# Patient Record
Sex: MALE | Race: WHITE | NOT HISPANIC OR LATINO | Employment: OTHER | URBAN - METROPOLITAN AREA
[De-identification: names, ages, dates, MRNs, and addresses within clinical notes are randomized per-mention and may not be internally consistent; named-entity substitution may affect disease eponyms.]

---

## 2020-03-19 PROBLEM — I10 ESSENTIAL HYPERTENSION: Status: ACTIVE | Noted: 2020-03-19

## 2020-03-19 PROBLEM — I48.91 A-FIB: Status: ACTIVE | Noted: 2020-03-19

## 2020-03-19 PROBLEM — R06.02 SOB (SHORTNESS OF BREATH): Status: ACTIVE | Noted: 2020-03-19

## 2020-03-19 PROBLEM — E78.5 HYPERLIPIDEMIA LDL GOAL <100: Status: ACTIVE | Noted: 2020-03-19

## 2020-03-19 PROBLEM — Q21.9: Status: ACTIVE | Noted: 2020-03-19

## 2020-03-19 NOTE — PROGRESS NOTES
OFFICE VISIT  NOTE  [facility] CARDIOLOGY      Name: Shaun Tyler    Date: 3/23/2020  MRN:  [id number]  :  1944      REFERRING/PRIMARY PROVIDER:  Alejandro Richard P*    Chief Complaint   Patient presents with   • Atrial Fibrillation   • Palpitations       HPI: Shaun Tyler is a 76 y.o. male who presents today for new consultation for a-fib.  Associate history of percutaneous ASD closure in , hypertension, hyperlipidemia.  Patient reports 3 days after ASD closure he had atrial fibrillation treated with external cardioversion successfully.  Approximately 1 year ago he developed fatigue and palpitations, diagnosed with paroxysmal atrial fibrillation.  A. fib worsening over the last 3 months, symptoms include shortness of breath, decreased exercise tolerance, and fatigue.  He cannot exercise as long as he used to.  Work-up at  2020 includes echocardiogram showing EF of 40 to 50%, mild LA enlargement, moderate RA enlargement, normal pulmonary pressures, ASD closure device in place.  Patient was started on metoprolol, Eliquis, and lisinopril recently.  Still having fatigue and palpitations.  No bleeding complications with Eliquis thus far.  Home blood pressure log reviewed, good control on current regimen.  No side effects of current medical therapy.    Past Medical History:   Diagnosis Date   • A-fib (CMS/HCC)    • Atrial septal defect    • Chicken pox    • Hiatal hernia    • Measles    • Mumps    • S/P patch closure of atrial septal defect        Past Surgical History:   Procedure Laterality Date   • ATRIAL SEPTAL DEFECT REPAIR         Social History     Socioeconomic History   • Marital status:      Spouse name: Not on file   • Number of children: Not on file   • Years of education: Not on file   • Highest education level: Not on file   Tobacco Use   • Smoking status: Never Smoker   • Smokeless tobacco: Never Used   Substance and Sexual Activity   • Alcohol  use: Not Currently   • Drug use: Never   • Sexual activity: Defer       Family History   Problem Relation Age of Onset   • Cancer Mother    • Pneumonia Sister         ROS:   Constitutional no fever,  no weight loss   Skin no rash, no subcutaneous nodules   Otolaryngeal no difficulty swallowing   Cardiovascular See HPI   Pulmonary no cough, no sputum production   Gastrointestinal no constipation, no diarrhea   Genitourinary no dysuria, no hematuria   Hematologic no easy bruisability, no abnormal bleeding   Musculoskeletal no muscle pain   Neurologic no dizziness, no falls         Allergies   Allergen Reactions   • Penicillins Itching         Current Outpatient Medications:   •  apixaban (ELIQUIS) 5 MG tablet tablet, Take 5 mg by mouth 2 (Two) Times a Day., Disp: , Rfl:   •  ferrous sulfate 325 (65 FE) MG tablet, Take 325 mg by mouth Daily With Breakfast., Disp: , Rfl:   •  fluticasone (FLONASE) 50 MCG/ACT nasal spray, 2 sprays into the nostril(s) as directed by provider Daily., Disp: , Rfl:   •  lansoprazole (PREVACID) 30 MG capsule, Take 30 mg by mouth Daily., Disp: , Rfl:   •  lisinopril (PRINIVIL,ZESTRIL) 5 MG tablet, Take 5 mg by mouth Daily., Disp: , Rfl:   •  montelukast (SINGULAIR) 10 MG tablet, Take 10 mg by mouth Every Night., Disp: , Rfl:   •  psyllium (METAMUCIL) 58.6 % packet, Take 1 packet by mouth Daily., Disp: , Rfl:   •  simvastatin (ZOCOR) 40 MG tablet, Take 40 mg by mouth Every Night., Disp: , Rfl:   •  sodium chloride 0.65 % nasal spray, 1 spray into the nostril(s) as directed by provider As Needed for Congestion., Disp: , Rfl:   •  flecainide (TAMBOCOR) 50 MG tablet, Take 1 tablet by mouth 2 (Two) Times a Day., Disp: 180 tablet, Rfl: 3  •  metoprolol succinate XL (TOPROL-XL) 25 MG 24 hr tablet, Take 1 tablet by mouth Daily., Disp: 90 tablet, Rfl: 3    Vitals:    03/23/20 0928   BP: 109/81   BP Location: Right arm   Patient Position: Sitting   Pulse: 82   Weight: 106 kg (234 lb 3.2 oz)   Height:  "195.6 cm (77\")     Body mass index is 27.77 kg/m².    PHYSICAL EXAM:    General Appearance:   · well developed  · well nourished  HENT:   · oropharynx moist  · lips not cyanotic  Neck:  · thyroid not enlarged  · supple  Respiratory:  · no respiratory distress  · normal breath sounds  · no rales  Cardiovascular:  · no jugular venous distention  · Irregular iregular rhythm  · apical impulse normal  · S1 normal, S2 normal  · no S3, no S4   · no murmur  · no rub, no thrill  · carotid pulses normal; no bruit  · pedal pulses normal  · lower extremity edema: none    Gastrointestinal:   · bowel sounds normal  · non-tender  · no hepatomegaly, no splenomegaly  Musculoskeletal:  · no clubbing of fingers.   · normocephalic, head atraumatic  Skin:   · warm, dry  Psychiatric:  · judgement and insight appropriate  · normal mood and affect    RESULTS:     ECG 12 Lead  Date/Time: 3/23/2020 10:26 AM  Performed by: Shaun Parker MD  Authorized by: Shaun Parker MD   Comparison: not compared with previous ECG   Previous ECG: no previous ECG available  Rhythm: atrial fibrillation  Rate: normal  BPM: 86  Comments: QRS duration 96 ms                   Labs:  No results found for: CHOL, TRIG, HDL, LDL, AST, ALT  No results found for: HGBA1C  No components found for: CREATINININE  No results found for: EGFRIFNONA      ASSESSMENT:  Problem List Items Addressed This Visit        Cardiovascular and Mediastinum    A-fib (CMS/HCC) - Primary    Overview     2/20/20 Echo  · LVEF 40-50%  · Right ventricular moderately dilated  · Right atrium moderately dilated  · Normal appearing atrial PFO closure device  · No significant valvular abnormalities    1/17/17 Echo  · LVEF >55%  · Normal PFO closure device  · No significant valvular abnormalities          Relevant Medications    metoprolol succinate XL (TOPROL-XL) 25 MG 24 hr tablet    Essential hypertension    Relevant Medications    lisinopril (PRINIVIL,ZESTRIL) 5 MG tablet    metoprolol " succinate XL (TOPROL-XL) 25 MG 24 hr tablet    Hyperlipidemia LDL goal <100    Relevant Medications    simvastatin (ZOCOR) 40 MG tablet    Congenital septal defect of heart    Overview     Hx of ASD closure, in [place], approximately 2003         Relevant Medications    metoprolol succinate XL (TOPROL-XL) 25 MG 24 hr tablet       Respiratory    SOB (shortness of breath)          PLAN:    1.  Persistent atrial fibrillation:  Due to ongoing nature of symptoms despite rate lowering medications, will proceed with antiarrhythmic and possible external cardioversion.  Start flecainide 50 mg twice daily, of note patient is NYHA I with an EF of 40-50%.  Continue metoprolol, decrease to 25 mg daily, he has XL formulation.  Continue Eliquis 5 mg twice daily  CHADSVASC score is 3.    We discussed good sleep hygiene, decrease caffeine, increase water, and moderate exercise.    2.  Hypertension:  Home blood pressure log reviewed, well controlled at this time on metoprolol and lisinopril, continue for now.  Long-term goal blood pressure is 130/80     3.  Hyperlipidemia:  Lipids well controlled on simvastatin 40 mg daily continue for now.    Return to clinic in 1 month, or sooner as needed.    Thank you for the opportunity to share in the care of your patient; please do not hesitate to call me with any questions.     Shaun Parker MD, [facility]  Office: [phone] [address] [place] [address]

## 2020-03-23 ENCOUNTER — TELEPHONE (OUTPATIENT)
Dept: CARDIOLOGY | Facility: CLINIC | Age: 75
End: 2020-03-23

## 2020-03-23 ENCOUNTER — CONSULT (OUTPATIENT)
Dept: CARDIOLOGY | Facility: CLINIC | Age: 75
End: 2020-03-23

## 2020-03-23 VITALS
WEIGHT: 234.2 LBS | HEART RATE: 82 BPM | SYSTOLIC BLOOD PRESSURE: 109 MMHG | HEIGHT: 77 IN | BODY MASS INDEX: 27.65 KG/M2 | DIASTOLIC BLOOD PRESSURE: 81 MMHG

## 2020-03-23 DIAGNOSIS — Q21.9 CONGENITAL SEPTAL DEFECT OF HEART: ICD-10-CM

## 2020-03-23 DIAGNOSIS — E78.5 HYPERLIPIDEMIA LDL GOAL <100: ICD-10-CM

## 2020-03-23 DIAGNOSIS — I10 ESSENTIAL HYPERTENSION: ICD-10-CM

## 2020-03-23 DIAGNOSIS — I48.19 OTHER PERSISTENT ATRIAL FIBRILLATION (HCC): Primary | ICD-10-CM

## 2020-03-23 DIAGNOSIS — R06.02 SOB (SHORTNESS OF BREATH): ICD-10-CM

## 2020-03-23 PROBLEM — Q21.10 ATRIAL SEPTAL DEFECT: Status: ACTIVE | Noted: 2020-03-23

## 2020-03-23 PROCEDURE — 93000 ELECTROCARDIOGRAM COMPLETE: CPT | Performed by: INTERNAL MEDICINE

## 2020-03-23 PROCEDURE — 99204 OFFICE O/P NEW MOD 45 MIN: CPT | Performed by: INTERNAL MEDICINE

## 2020-03-23 RX ORDER — MONTELUKAST SODIUM 10 MG/1
10 TABLET ORAL NIGHTLY
COMMUNITY
End: 2022-07-11

## 2020-03-23 RX ORDER — ECHINACEA PURPUREA EXTRACT 125 MG
1 TABLET ORAL AS NEEDED
COMMUNITY
End: 2021-03-08

## 2020-03-23 RX ORDER — LISINOPRIL 5 MG/1
5 TABLET ORAL DAILY
COMMUNITY
End: 2020-09-28 | Stop reason: SDUPTHER

## 2020-03-23 RX ORDER — METOPROLOL SUCCINATE 25 MG/1
25 TABLET, EXTENDED RELEASE ORAL DAILY
Qty: 90 TABLET | Refills: 3 | Status: SHIPPED | OUTPATIENT
Start: 2020-03-23 | End: 2020-03-26 | Stop reason: SDUPTHER

## 2020-03-23 RX ORDER — FERROUS SULFATE 325(65) MG
325 TABLET ORAL WEEKLY
COMMUNITY

## 2020-03-23 RX ORDER — METOPROLOL SUCCINATE 25 MG/1
25 TABLET, EXTENDED RELEASE ORAL 2 TIMES DAILY
COMMUNITY
End: 2020-03-23 | Stop reason: DRUGHIGH

## 2020-03-23 RX ORDER — LANSOPRAZOLE 30 MG/1
30 CAPSULE, DELAYED RELEASE ORAL DAILY
COMMUNITY

## 2020-03-23 RX ORDER — FLUTICASONE PROPIONATE 50 MCG
2 SPRAY, SUSPENSION (ML) NASAL AS NEEDED
COMMUNITY

## 2020-03-23 RX ORDER — FLECAINIDE ACETATE 50 MG/1
50 TABLET ORAL 2 TIMES DAILY
Qty: 180 TABLET | Refills: 3 | Status: SHIPPED | OUTPATIENT
Start: 2020-03-23 | End: 2020-03-26 | Stop reason: SDUPTHER

## 2020-03-23 RX ORDER — SIMVASTATIN 40 MG
40 TABLET ORAL NIGHTLY
Status: ON HOLD | COMMUNITY
End: 2022-07-19

## 2020-03-23 NOTE — TELEPHONE ENCOUNTER
Pt called to clarify when to take his medications. I advised he is to take Flecainide 50 mg twice a day, once in the morning and once in the evening and he is to take his metoprolol succinate only ONCE a day. Advised he may take it with his flecainide in the morning. Advised if he has more questions to call me. He repeated medication schedule back to me. Verbalized understanding and agreeable to plan.

## 2020-03-23 NOTE — PATIENT INSTRUCTIONS
Start flecainide 50 mg twice daily    Take metoprolol 25 mg once daily    Continue Eliquis 5 mg twice daily.

## 2020-03-26 RX ORDER — METOPROLOL SUCCINATE 25 MG/1
25 TABLET, EXTENDED RELEASE ORAL DAILY
Qty: 90 TABLET | Refills: 3 | Status: SHIPPED | OUTPATIENT
Start: 2020-03-26 | End: 2020-03-26 | Stop reason: SDUPTHER

## 2020-03-26 RX ORDER — METOPROLOL SUCCINATE 25 MG/1
25 TABLET, EXTENDED RELEASE ORAL DAILY
Qty: 90 TABLET | Refills: 3 | Status: SHIPPED | OUTPATIENT
Start: 2020-03-26 | End: 2020-05-06 | Stop reason: HOSPADM

## 2020-03-26 RX ORDER — FLECAINIDE ACETATE 50 MG/1
50 TABLET ORAL 2 TIMES DAILY
Qty: 180 TABLET | Refills: 3 | Status: SHIPPED | OUTPATIENT
Start: 2020-03-26 | End: 2020-08-24 | Stop reason: SDUPTHER

## 2020-03-26 RX ORDER — FLECAINIDE ACETATE 50 MG/1
50 TABLET ORAL 2 TIMES DAILY
Qty: 180 TABLET | Refills: 3 | Status: SHIPPED | OUTPATIENT
Start: 2020-03-26 | End: 2020-03-26 | Stop reason: SDUPTHER

## 2020-03-26 NOTE — TELEPHONE ENCOUNTER
RX were sent again. Pt called to let us know his birthday is wrong in our system and I need to send RX's again under correct birthday. His  is 1945. This was corrected on the chart.

## 2020-04-27 ENCOUNTER — OFFICE VISIT (OUTPATIENT)
Dept: CARDIOLOGY | Facility: CLINIC | Age: 75
End: 2020-04-27

## 2020-04-27 VITALS
DIASTOLIC BLOOD PRESSURE: 78 MMHG | HEART RATE: 85 BPM | SYSTOLIC BLOOD PRESSURE: 110 MMHG | WEIGHT: 236 LBS | BODY MASS INDEX: 27.87 KG/M2 | HEIGHT: 77 IN

## 2020-04-27 DIAGNOSIS — I10 ESSENTIAL HYPERTENSION: ICD-10-CM

## 2020-04-27 DIAGNOSIS — Q21.9 CONGENITAL SEPTAL DEFECT OF HEART: ICD-10-CM

## 2020-04-27 DIAGNOSIS — I48.19 OTHER PERSISTENT ATRIAL FIBRILLATION (HCC): Primary | ICD-10-CM

## 2020-04-27 DIAGNOSIS — E78.5 HYPERLIPIDEMIA LDL GOAL <100: ICD-10-CM

## 2020-04-27 DIAGNOSIS — Q21.10 ATRIAL SEPTAL DEFECT: ICD-10-CM

## 2020-04-27 PROCEDURE — 93000 ELECTROCARDIOGRAM COMPLETE: CPT | Performed by: INTERNAL MEDICINE

## 2020-04-27 PROCEDURE — 99214 OFFICE O/P EST MOD 30 MIN: CPT | Performed by: INTERNAL MEDICINE

## 2020-04-27 NOTE — PROGRESS NOTES
OFFICE VISIT  NOTE  [facility] CARDIOLOGY      Name: Shaun Tyler    Date: 2020  MRN:  [id number]  :  1945      REFERRING/PRIMARY PROVIDER:  No ref. provider found    Chief Complaint   Patient presents with   • Atrial Fibrillation   • Shortness of Breath       HPI: Shaun Tyler is a 75 y.o. male who presents today for for a-fib.  Associated history of percutaneous ASD closure in , hypertension, hyperlipidemia.  Patient reports 3 days after ASD closure he had atrial fibrillation treated with external cardioversion successfully.  Approximately 1 year ago he developed fatigue and palpitations, diagnosed with paroxysmal atrial fibrillation.  A. fib worsening over the last 3 months, symptoms include shortness of breath, decreased exercise tolerance, and fatigue.  He cannot exercise as long as he used to.  Work-up at  2020 includes echocardiogram showing EF of 40 to 50%, mild LA enlargement, moderate RA enlargement, normal pulmonary pressures, ASD closure device in place.   Start flecainide 3/23/2020, continues on Eliquis twice daily.  Home blood pressures running  systolic.  He still has shortness of breath with minimal activity and decreased energy.  No bleeding complications with Eliquis.    Past Medical History:   Diagnosis Date   • A-fib (CMS/HCC)    • Atrial septal defect    • Chicken pox    • Hiatal hernia    • Measles    • Mumps    • S/P patch closure of atrial septal defect        Past Surgical History:   Procedure Laterality Date   • ATRIAL SEPTAL DEFECT REPAIR         Social History     Socioeconomic History   • Marital status:      Spouse name: Not on file   • Number of children: Not on file   • Years of education: Not on file   • Highest education level: Not on file   Tobacco Use   • Smoking status: Never Smoker   • Smokeless tobacco: Never Used   Substance and Sexual Activity   • Alcohol use: Not Currently   • Drug use: Never   •  "Sexual activity: Defer       Family History   Problem Relation Age of Onset   • Cancer Mother    • Pneumonia Sister         ROS:   Constitutional no fever,  no weight loss   Skin no rash, no subcutaneous nodules   Otolaryngeal no difficulty swallowing   Cardiovascular See HPI   Pulmonary no cough, no sputum production   Gastrointestinal no constipation, no diarrhea   Genitourinary no dysuria, no hematuria   Hematologic no easy bruisability, no abnormal bleeding   Musculoskeletal no muscle pain   Neurologic no dizziness, no falls         Allergies   Allergen Reactions   • Penicillins Itching         Current Outpatient Medications:   •  apixaban (ELIQUIS) 5 MG tablet tablet, Take 5 mg by mouth 2 (Two) Times a Day., Disp: , Rfl:   •  ferrous sulfate 325 (65 FE) MG tablet, Take 325 mg by mouth 1 (One) Time Per Week., Disp: , Rfl:   •  flecainide (TAMBOCOR) 50 MG tablet, Take 1 tablet by mouth 2 (Two) Times a Day., Disp: 180 tablet, Rfl: 3  •  fluticasone (FLONASE) 50 MCG/ACT nasal spray, 2 sprays into the nostril(s) as directed by provider Daily., Disp: , Rfl:   •  lansoprazole (PREVACID) 30 MG capsule, Take 30 mg by mouth Daily., Disp: , Rfl:   •  lisinopril (PRINIVIL,ZESTRIL) 5 MG tablet, Take 5 mg by mouth Daily., Disp: , Rfl:   •  metoprolol succinate XL (TOPROL-XL) 25 MG 24 hr tablet, Take 1 tablet by mouth Daily., Disp: 90 tablet, Rfl: 3  •  montelukast (SINGULAIR) 10 MG tablet, Take 10 mg by mouth Every Night., Disp: , Rfl:   •  psyllium (METAMUCIL) 58.6 % packet, Take 1 packet by mouth Daily., Disp: , Rfl:   •  simvastatin (ZOCOR) 40 MG tablet, Take 40 mg by mouth Every Night., Disp: , Rfl:   •  sodium chloride 0.65 % nasal spray, 1 spray into the nostril(s) as directed by provider As Needed for Congestion., Disp: , Rfl:     Vitals:    04/27/20 0930   BP: 110/78   BP Location: Left arm   Patient Position: Sitting   Pulse: 85   Weight: 107 kg (236 lb)   Height: 195.6 cm (77\")     Body mass index is 27.99 " kg/m².    PHYSICAL EXAM:    General Appearance:   · well developed  · well nourished  HENT:   · oropharynx moist  · lips not cyanotic  Neck:  · thyroid not enlarged  · supple  Respiratory:  · no respiratory distress  · normal breath sounds  · no rales  Cardiovascular:  · no jugular venous distention  · Irregular iregular rhythm  · apical impulse normal  · S1 normal, S2 normal  · no S3, no S4   · no murmur  · no rub, no thrill  · carotid pulses normal; no bruit  · pedal pulses normal  · lower extremity edema: none    Gastrointestinal:   · bowel sounds normal  · non-tender  · no hepatomegaly, no splenomegaly  Musculoskeletal:  · no clubbing of fingers.   · normocephalic, head atraumatic  Skin:   · warm, dry  Psychiatric:  · judgement and insight appropriate  · normal mood and affect    RESULTS:     ECG 12 Lead  Date/Time: 4/27/2020 9:56 AM  Performed by: Shaun Parker MD  Authorized by: Shaun Parker MD   Comparison: compared with previous ECG from 3/23/2020  Similar to previous ECG  Rhythm: atrial fibrillation  Rate: normal  BPM: 93    Clinical impression: abnormal EKG                   Labs:  No results found for: CHOL, TRIG, HDL, LDL, AST, ALT  No results found for: HGBA1C  No components found for: CREATINININE  No results found for: EGFRIFNONA      ASSESSMENT:  Problem List Items Addressed This Visit        Cardiovascular and Mediastinum    A-fib (CMS/HCC) - Primary    Overview     2/20/20 Echo  · LVEF 40-50%  · Right ventricular moderately dilated  · Right atrium moderately dilated  · Normal appearing atrial PFO closure device  · No significant valvular abnormalities    1/17/17 Echo  · LVEF >55%  · Normal PFO closure device  · No significant valvular abnormalities          Relevant Orders    Cardioversion External in Cardiology Department    Essential hypertension    Hyperlipidemia LDL goal <100    Congenital septal defect of heart    Overview     Hx of ASD closure, in [place], approximately 2003          Atrial septal defect    Overview     Percutaneous closure 2003.               PLAN:    1.  Persistent atrial fibrillation:  Due to ongoing symptoms and persistence of atrial fibrillation we will proceed with external cardioversion.  Patient has been on anticoagulation for at least 30 days.  Continue flecainide 50 mg twice daily  Continue metoprolol, decrease to 25 mg daily, he has XL formulation.  Continue Eliquis 5 mg twice daily  CHADSVASC score is 3.    We discussed good sleep hygiene, decrease caffeine, increase water, and moderate exercise.    2.  Hypertension:  Home blood pressure log reviewed, well controlled at this time on metoprolol and lisinopril, continue for now.  Well-controlled currently.  Long-term goal blood pressure is 130/80     3.  Hyperlipidemia:  Well-controlled, continue simvastatin 40 mg daily    Return to clinic in 6 weeks, or sooner as needed.    Thank you for the opportunity to share in the care of your patient; please do not hesitate to call me with any questions.     Shaun Parker MD, [facility]  Office: [phone] [address] [facility]  [address] [place] [address]

## 2020-04-29 ENCOUNTER — DOCUMENTATION (OUTPATIENT)
Dept: CARDIOLOGY | Facility: CLINIC | Age: 75
End: 2020-04-29

## 2020-04-29 NOTE — PROGRESS NOTES
Patient Shaun Tyler  1945 is coming on  with Dr. Parker for an ECV.  His contact is his wife Alexsandra) at [phone].

## 2020-04-30 ENCOUNTER — PREP FOR SURGERY (OUTPATIENT)
Dept: OTHER | Facility: HOSPITAL | Age: 75
End: 2020-04-30

## 2020-04-30 DIAGNOSIS — I48.0 PAF (PAROXYSMAL ATRIAL FIBRILLATION) (HCC): Primary | ICD-10-CM

## 2020-04-30 RX ORDER — SODIUM CHLORIDE 0.9 % (FLUSH) 0.9 %
10 SYRINGE (ML) INJECTION AS NEEDED
Status: CANCELLED | OUTPATIENT
Start: 2020-04-30

## 2020-04-30 RX ORDER — LIDOCAINE HYDROCHLORIDE 10 MG/ML
0.1 INJECTION, SOLUTION EPIDURAL; INFILTRATION; INTRACAUDAL; PERINEURAL ONCE AS NEEDED
Status: CANCELLED | OUTPATIENT
Start: 2020-04-30

## 2020-04-30 RX ORDER — SODIUM CHLORIDE 0.9 % (FLUSH) 0.9 %
3 SYRINGE (ML) INJECTION EVERY 12 HOURS SCHEDULED
Status: CANCELLED | OUTPATIENT
Start: 2020-04-30

## 2020-04-30 RX ORDER — ONDANSETRON 2 MG/ML
4 INJECTION INTRAMUSCULAR; INTRAVENOUS EVERY 6 HOURS PRN
Status: CANCELLED | OUTPATIENT
Start: 2020-04-30

## 2020-05-03 ENCOUNTER — OFFICE VISIT (OUTPATIENT)
Dept: PREADMISSION TESTING | Facility: HOSPITAL | Age: 75
End: 2020-05-03

## 2020-05-03 PROCEDURE — U0004 COV-19 TEST NON-CDC HGH THRU: HCPCS | Performed by: INTERNAL MEDICINE

## 2020-05-03 PROCEDURE — U0002 COVID-19 LAB TEST NON-CDC: HCPCS | Performed by: INTERNAL MEDICINE

## 2020-05-04 LAB
REF LAB TEST METHOD: NORMAL
SARS-COV-2 RNA RESP QL NAA+PROBE: NOT DETECTED

## 2020-05-06 ENCOUNTER — HOSPITAL ENCOUNTER (OUTPATIENT)
Dept: CARDIOLOGY | Facility: HOSPITAL | Age: 75
Discharge: HOME | End: 2020-05-06
Attending: INTERNAL MEDICINE | Admitting: INTERNAL MEDICINE

## 2020-05-06 VITALS
HEART RATE: 55 BPM | SYSTOLIC BLOOD PRESSURE: 125 MMHG | RESPIRATION RATE: 16 BRPM | WEIGHT: 232.37 LBS | DIASTOLIC BLOOD PRESSURE: 73 MMHG | HEIGHT: 77 IN | TEMPERATURE: 97.2 F | BODY MASS INDEX: 27.44 KG/M2 | OXYGEN SATURATION: 98 %

## 2020-05-06 DIAGNOSIS — I48.0 PAF (PAROXYSMAL ATRIAL FIBRILLATION) (HCC): ICD-10-CM

## 2020-05-06 DIAGNOSIS — I48.19 OTHER PERSISTENT ATRIAL FIBRILLATION (HCC): ICD-10-CM

## 2020-05-06 LAB
ANION GAP SERPL CALCULATED.3IONS-SCNC: 11 MMOL/L (ref 5–15)
BUN BLD-MCNC: 19 MG/DL (ref 8–23)
BUN/CREAT SERPL: 19.4 (ref 7–25)
CALCIUM SPEC-SCNC: 8.9 MG/DL (ref 8.6–10.5)
CHLORIDE SERPL-SCNC: 106 MMOL/L (ref 98–107)
CO2 SERPL-SCNC: 23 MMOL/L (ref 22–29)
CREAT BLD-MCNC: 0.98 MG/DL (ref 0.76–1.27)
DEPRECATED RDW RBC AUTO: 47.3 FL (ref 37–54)
ERYTHROCYTE [DISTWIDTH] IN BLOOD BY AUTOMATED COUNT: 13.3 % (ref 12.3–15.4)
GFR SERPL CREATININE-BSD FRML MDRD: 75 ML/MIN/1.73
GLUCOSE BLD-MCNC: 102 MG/DL (ref 65–99)
HCT VFR BLD AUTO: 46.7 % (ref 37.5–51)
HGB BLD-MCNC: 15.6 G/DL (ref 13–17.7)
MCH RBC QN AUTO: 31.8 PG (ref 26.6–33)
MCHC RBC AUTO-ENTMCNC: 33.4 G/DL (ref 31.5–35.7)
MCV RBC AUTO: 95.3 FL (ref 79–97)
PLATELET # BLD AUTO: 197 10*3/MM3 (ref 140–450)
PMV BLD AUTO: 9.7 FL (ref 6–12)
POTASSIUM BLD-SCNC: 5.1 MMOL/L (ref 3.5–5.2)
RBC # BLD AUTO: 4.9 10*6/MM3 (ref 4.14–5.8)
SODIUM BLD-SCNC: 140 MMOL/L (ref 136–145)
WBC NRBC COR # BLD: 5.77 10*3/MM3 (ref 3.4–10.8)

## 2020-05-06 PROCEDURE — 93005 ELECTROCARDIOGRAM TRACING: CPT | Performed by: INTERNAL MEDICINE

## 2020-05-06 PROCEDURE — 92960 CARDIOVERSION ELECTRIC EXT: CPT

## 2020-05-06 PROCEDURE — 25010000002 MIDAZOLAM PER 1 MG: Performed by: INTERNAL MEDICINE

## 2020-05-06 PROCEDURE — 36415 COLL VENOUS BLD VENIPUNCTURE: CPT

## 2020-05-06 PROCEDURE — 85027 COMPLETE CBC AUTOMATED: CPT | Performed by: NURSE PRACTITIONER

## 2020-05-06 PROCEDURE — 82565 ASSAY OF CREATININE: CPT

## 2020-05-06 PROCEDURE — 80048 BASIC METABOLIC PNL TOTAL CA: CPT | Performed by: NURSE PRACTITIONER

## 2020-05-06 PROCEDURE — 92960 CARDIOVERSION ELECTRIC EXT: CPT | Performed by: INTERNAL MEDICINE

## 2020-05-06 PROCEDURE — 93005 ELECTROCARDIOGRAM TRACING: CPT | Performed by: NURSE PRACTITIONER

## 2020-05-06 RX ORDER — MIDAZOLAM HYDROCHLORIDE 1 MG/ML
INJECTION INTRAMUSCULAR; INTRAVENOUS
Status: DISCONTINUED
Start: 2020-05-06 | End: 2020-05-06 | Stop reason: HOSPADM

## 2020-05-06 RX ORDER — FLUMAZENIL 0.1 MG/ML
INJECTION INTRAVENOUS
Status: DISCONTINUED
Start: 2020-05-06 | End: 2020-05-06 | Stop reason: WASHOUT

## 2020-05-06 RX ORDER — ONDANSETRON 2 MG/ML
4 INJECTION INTRAMUSCULAR; INTRAVENOUS EVERY 6 HOURS PRN
Status: DISCONTINUED | OUTPATIENT
Start: 2020-05-06 | End: 2020-05-06 | Stop reason: HOSPADM

## 2020-05-06 RX ORDER — SODIUM CHLORIDE 0.9 % (FLUSH) 0.9 %
10 SYRINGE (ML) INJECTION AS NEEDED
Status: DISCONTINUED | OUTPATIENT
Start: 2020-05-06 | End: 2020-05-06 | Stop reason: HOSPADM

## 2020-05-06 RX ORDER — SODIUM CHLORIDE 0.9 % (FLUSH) 0.9 %
3 SYRINGE (ML) INJECTION EVERY 12 HOURS SCHEDULED
Status: DISCONTINUED | OUTPATIENT
Start: 2020-05-06 | End: 2020-05-06 | Stop reason: HOSPADM

## 2020-05-06 RX ORDER — MIDAZOLAM HYDROCHLORIDE 1 MG/ML
INJECTION INTRAMUSCULAR; INTRAVENOUS
Status: COMPLETED | OUTPATIENT
Start: 2020-05-06 | End: 2020-05-06

## 2020-05-06 RX ORDER — LIDOCAINE HYDROCHLORIDE 10 MG/ML
0.1 INJECTION, SOLUTION EPIDURAL; INFILTRATION; INTRACAUDAL; PERINEURAL ONCE AS NEEDED
Status: DISCONTINUED | OUTPATIENT
Start: 2020-05-06 | End: 2020-05-06 | Stop reason: HOSPADM

## 2020-05-06 RX ADMIN — MIDAZOLAM 2 MG: 1 INJECTION INTRAMUSCULAR; INTRAVENOUS at 11:13

## 2020-05-06 RX ADMIN — METHOHEXITAL SODIUM 20 MG: 500 INJECTION, POWDER, LYOPHILIZED, FOR SOLUTION INTRAMUSCULAR; INTRAVENOUS; RECTAL at 11:11

## 2020-05-06 RX ADMIN — METHOHEXITAL SODIUM 30 MG: 500 INJECTION, POWDER, LYOPHILIZED, FOR SOLUTION INTRAMUSCULAR; INTRAVENOUS; RECTAL at 11:09

## 2020-05-06 NOTE — INTERVAL H&P NOTE
H&P reviewed. The patient was examined and there are no changes to the H&P.     PE performed.  Vitals reviewed.  Labs pending.  Tele:  Atrial fibrillation, HR 75 bpm    Patient is here today to undergo ECV.  He has been compliant with NOAC (Eliquis 5mg BID).  Risks and benefits reviewed and patient is willing to proceed.  Further recommendations based on outcomes.  Asha Alvarez PA-C    Agree  Shaun Parker M.D., F.A.C.C.  Interventional Cardiology  05/06/20  10:38

## 2020-05-12 LAB — CREAT BLDA-MCNC: 1.1 MG/DL (ref 0.6–1.3)

## 2020-06-18 NOTE — PROGRESS NOTES
OFFICE VISIT  NOTE  [facility] CARDIOLOGY      Name: Shaun Tyler    Date: 2020  MRN:  [id number]  :  1945      REFERRING/PRIMARY PROVIDER:  Alejandro Richard PA-C    Chief Complaint   Patient presents with   • Atrial Fibrillation       HPI: Shaun Tyler is a 75 y.o. male who presents today for follow-up for a-fib.  Associated history of percutaneous ASD closure in , hypertension, hyperlipidemia.  Patient reports 3 days after ASD closure he had atrial fibrillation treated with external cardioversion successfully.  Approximately 1 year ago he developed fatigue and palpitations, diagnosed with paroxysmal atrial fibrillation.  He cannot exercise as long as he used to.  Work-up at  2020 includes echocardiogram showing EF of 40 to 50%, mild LA enlargement, moderate RA enlargement, normal pulmonary pressures, ASD closure device in place.   Start flecainide 3/23/2020, underwent successful external cardioversion 2020.  Maintains sinus bradycardia heart rate 51 today.  Reports improved symptoms, less fatigue, greater exercise tolerance but not back to baseline of 6 months ago.  He has shortness of breath and decreased exercise tolerance with walking at a faster pace.    Past Medical History:   Diagnosis Date   • A-fib (CMS/HCC)    • Atrial septal defect    • Chicken pox    • Hiatal hernia    • Measles    • Mumps    • S/P patch closure of atrial septal defect        Past Surgical History:   Procedure Laterality Date   • ATRIAL SEPTAL DEFECT REPAIR         Social History     Socioeconomic History   • Marital status:      Spouse name: Not on file   • Number of children: Not on file   • Years of education: Not on file   • Highest education level: Not on file   Tobacco Use   • Smoking status: Never Smoker   • Smokeless tobacco: Never Used   Substance and Sexual Activity   • Alcohol use: Not Currently   • Drug use: Never   • Sexual activity: Defer  "      Family History   Problem Relation Age of Onset   • Cancer Mother    • Pneumonia Sister         ROS:   Constitutional no fever,  no weight loss   Skin no rash, no subcutaneous nodules   Otolaryngeal no difficulty swallowing   Cardiovascular See HPI   Pulmonary no cough, no sputum production   Gastrointestinal no constipation, no diarrhea   Genitourinary no dysuria, no hematuria   Hematologic no easy bruisability, no abnormal bleeding   Musculoskeletal no muscle pain   Neurologic no dizziness, no falls         Allergies   Allergen Reactions   • Penicillins Itching         Current Outpatient Medications:   •  apixaban (ELIQUIS) 5 MG tablet tablet, Take 1 tablet by mouth 2 (Two) Times a Day., Disp: 180 tablet, Rfl: 3  •  ferrous sulfate 325 (65 FE) MG tablet, Take 325 mg by mouth 1 (One) Time Per Week., Disp: , Rfl:   •  flecainide (TAMBOCOR) 50 MG tablet, Take 1 tablet by mouth 2 (Two) Times a Day., Disp: 180 tablet, Rfl: 3  •  fluticasone (FLONASE) 50 MCG/ACT nasal spray, 2 sprays into the nostril(s) as directed by provider Daily., Disp: , Rfl:   •  lansoprazole (PREVACID) 30 MG capsule, Take 30 mg by mouth Daily., Disp: , Rfl:   •  lisinopril (PRINIVIL,ZESTRIL) 5 MG tablet, Take 5 mg by mouth Daily., Disp: , Rfl:   •  montelukast (SINGULAIR) 10 MG tablet, Take 10 mg by mouth Every Night., Disp: , Rfl:   •  psyllium (METAMUCIL) 58.6 % packet, Take 1 packet by mouth Daily., Disp: , Rfl:   •  simvastatin (ZOCOR) 40 MG tablet, Take 40 mg by mouth Every Night., Disp: , Rfl:   •  sodium chloride 0.65 % nasal spray, 1 spray into the nostril(s) as directed by provider As Needed for Congestion., Disp: , Rfl:     Vitals:    06/22/20 1359   BP: 118/64   BP Location: Right arm   Patient Position: Sitting   Pulse: 67   Temp: 97.8 °F (36.6 °C)   SpO2: 97%   Weight: 105 kg (231 lb 9.6 oz)   Height: 195.6 cm (77\")     Body mass index is 27.46 kg/m².    PHYSICAL EXAM:    General Appearance:   · well developed  · well " nourished  HENT:   · oropharynx moist  · lips not cyanotic  Neck:  · thyroid not enlarged  · supple  Respiratory:  · no respiratory distress  · normal breath sounds  · no rales  Cardiovascular:  · no jugular venous distention  · Irregular iregular rhythm  · apical impulse normal  · S1 normal, S2 normal  · no S3, no S4   · no murmur  · no rub, no thrill  · carotid pulses normal; no bruit  · pedal pulses normal  · lower extremity edema: none    Gastrointestinal:   · bowel sounds normal  · non-tender  · no hepatomegaly, no splenomegaly  Musculoskeletal:  · no clubbing of fingers.   · normocephalic, head atraumatic  Skin:   · warm, dry  Psychiatric:  · judgement and insight appropriate  · normal mood and affect    RESULTS:     ECG 12 Lead  Date/Time: 6/22/2020 4:35 PM  Performed by: Shaun Parker MD  Authorized by: Shaun Parker MD   Comparison: compared with previous ECG from 5/6/2020  Similar to previous ECG  Rhythm: sinus bradycardia  Rate: bradycardic  QRS axis: normal    Clinical impression: non-specific ECG  Comments: QRS duration 98 ms,  ms.                   Labs:  No results found for: CHOL, TRIG, HDL, LDL, AST, ALT  No results found for: HGBA1C  No components found for: CREATINININE  eGFR Non  Amer   Date Value Ref Range Status   05/06/2020 75 >60 mL/min/1.73 Final         ASSESSMENT:  Problem List Items Addressed This Visit        Cardiovascular and Mediastinum    A-fib (CMS/HCC) - Primary    Overview     5/6/20: successful ECV    2/20/20 Echo  · LVEF 40-50%  · Right ventricular moderately dilated  · Right atrium moderately dilated  · Normal appearing atrial PFO closure device  · No significant valvular abnormalities    1/17/17 Echo  · LVEF >55%  · Normal PFO closure device  · No significant valvular abnormalities          Essential hypertension    Hyperlipidemia LDL goal <100    Atrial septal defect    Overview     Percutaneous closure 2003.               PLAN:    1.  Persistent atrial  fibrillation:  Successful ECV on 5/6/20  Continue flecainide 50 mg twice daily  Metoprolol discontinued due to bradycardia  Continue Eliquis 5 mg twice daily  CHADSVASC score is 3.    2.  Hypertension:  Home blood pressure log reviewed, well controlled at this time on metoprolol and lisinopril, continue for now.  Well-controlled currently.  Long-term goal blood pressure is 130/80     3.  Hyperlipidemia:  Well-controlled, continue simvastatin 40 mg daily    4.  Decreased exercise tolerance:  We discussed options of repeat echocardiogram and stress test versus watchful waiting.  At this time he would like to continue to incrementally increase his exercise regimen at home and if symptoms not improved in 2 months we will proceed with above work-up.    Also considered bradycardia as a source, he may require pacemaker in the future if symptoms persist despite normal echo and stress test.    Return to clinic in 8 weeks, or sooner as needed.    Thank you for the opportunity to share in the care of your patient; please do not hesitate to call me with any questions.     Shaun Parker MD, [facility]  Office: [phone] [address] [facility]  [address] [place] [address]

## 2020-06-22 ENCOUNTER — OFFICE VISIT (OUTPATIENT)
Dept: CARDIOLOGY | Facility: CLINIC | Age: 75
End: 2020-06-22

## 2020-06-22 VITALS
OXYGEN SATURATION: 97 % | HEIGHT: 77 IN | BODY MASS INDEX: 27.35 KG/M2 | HEART RATE: 67 BPM | WEIGHT: 231.6 LBS | DIASTOLIC BLOOD PRESSURE: 64 MMHG | TEMPERATURE: 97.8 F | SYSTOLIC BLOOD PRESSURE: 118 MMHG

## 2020-06-22 DIAGNOSIS — I48.19 OTHER PERSISTENT ATRIAL FIBRILLATION (HCC): Primary | ICD-10-CM

## 2020-06-22 DIAGNOSIS — I10 ESSENTIAL HYPERTENSION: ICD-10-CM

## 2020-06-22 DIAGNOSIS — Q21.10 ATRIAL SEPTAL DEFECT: ICD-10-CM

## 2020-06-22 DIAGNOSIS — E78.5 HYPERLIPIDEMIA LDL GOAL <100: ICD-10-CM

## 2020-06-22 PROCEDURE — 99214 OFFICE O/P EST MOD 30 MIN: CPT | Performed by: INTERNAL MEDICINE

## 2020-06-22 PROCEDURE — 93000 ELECTROCARDIOGRAM COMPLETE: CPT | Performed by: INTERNAL MEDICINE

## 2020-08-24 ENCOUNTER — OFFICE VISIT (OUTPATIENT)
Dept: CARDIOLOGY | Facility: CLINIC | Age: 75
End: 2020-08-24

## 2020-08-24 VITALS
WEIGHT: 231.8 LBS | HEIGHT: 77 IN | SYSTOLIC BLOOD PRESSURE: 114 MMHG | DIASTOLIC BLOOD PRESSURE: 70 MMHG | TEMPERATURE: 97.5 F | BODY MASS INDEX: 27.37 KG/M2 | OXYGEN SATURATION: 97 % | HEART RATE: 48 BPM

## 2020-08-24 DIAGNOSIS — Z87.74 HISTORY OF PERCUTANEOUS TRANSCATHETER CLOSURE OF CONGENITAL ASD: ICD-10-CM

## 2020-08-24 DIAGNOSIS — Q21.9 CONGENITAL SEPTAL DEFECT OF HEART: ICD-10-CM

## 2020-08-24 DIAGNOSIS — I10 ESSENTIAL HYPERTENSION: ICD-10-CM

## 2020-08-24 DIAGNOSIS — Q21.10 ATRIAL SEPTAL DEFECT: ICD-10-CM

## 2020-08-24 DIAGNOSIS — E78.5 HYPERLIPIDEMIA LDL GOAL <100: ICD-10-CM

## 2020-08-24 DIAGNOSIS — I48.0 PAROXYSMAL ATRIAL FIBRILLATION (HCC): Primary | ICD-10-CM

## 2020-08-24 PROCEDURE — 93000 ELECTROCARDIOGRAM COMPLETE: CPT | Performed by: INTERNAL MEDICINE

## 2020-08-24 PROCEDURE — 99214 OFFICE O/P EST MOD 30 MIN: CPT | Performed by: INTERNAL MEDICINE

## 2020-08-24 RX ORDER — FLECAINIDE ACETATE 50 MG/1
50 TABLET ORAL 2 TIMES DAILY
Qty: 180 TABLET | Refills: 3 | Status: SHIPPED | OUTPATIENT
Start: 2020-08-24 | End: 2021-09-14

## 2020-08-24 NOTE — PROGRESS NOTES
OFFICE VISIT  NOTE  [facility] CARDIOLOGY      Name: Shaun Tyler    Date: 2020  MRN:  [id number]  :  1945      REFERRING/PRIMARY PROVIDER:  Alejandro Richard PA-C    Chief Complaint   Patient presents with   • Persistent Atrial Fibrillation       HPI: Shaun Tyler is a 75 y.o. male who presents today for follow-up for a-fib.  Associated history of percutaneous ASD closure in , hypertension, hyperlipidemia.  Patient reports 3 days after ASD closure he had atrial fibrillation treated with external cardioversion successfully.  Approximately 1 year ago he developed fatigue and palpitations, diagnosed with paroxysmal atrial fibrillation.  He cannot exercise as long as he used to.  Work-up at  2020 includes echocardiogram showing EF of 40 to 50%, mild LA enlargement, moderate RA enlargement, normal pulmonary pressures, ASD closure device in place.   Started flecainide 3/23/2020, underwent successful external cardioversion 2020.  Doing better after last visit, started exercising by walking 1 hour/day at approximately 3.5 mph.  Also lifting weights.  Denies any chest pain or shortness of breath with these activities.  Denies dizziness or lightheadedness upon standing or with other usual activities.  No bleeding complications with Eliquis.    Past Medical History:   Diagnosis Date   • A-fib (CMS/HCC)    • Atrial septal defect    • Chicken pox    • Hiatal hernia    • Measles    • Mumps    • S/P patch closure of atrial septal defect        Past Surgical History:   Procedure Laterality Date   • ATRIAL SEPTAL DEFECT REPAIR         Social History     Socioeconomic History   • Marital status:      Spouse name: Not on file   • Number of children: Not on file   • Years of education: Not on file   • Highest education level: Not on file   Tobacco Use   • Smoking status: Never Smoker   • Smokeless tobacco: Never Used   Substance and Sexual Activity   •  "Alcohol use: Not Currently   • Drug use: Never   • Sexual activity: Defer       Family History   Problem Relation Age of Onset   • Cancer Mother    • Pneumonia Sister         ROS:   Constitutional no fever,  no weight loss   Skin no rash, no subcutaneous nodules   Otolaryngeal no difficulty swallowing   Cardiovascular See HPI   Pulmonary no cough, no sputum production   Gastrointestinal no constipation, no diarrhea   Genitourinary no dysuria, no hematuria   Hematologic no easy bruisability, no abnormal bleeding   Musculoskeletal no muscle pain   Neurologic no dizziness, no falls         Allergies   Allergen Reactions   • Penicillins Itching         Current Outpatient Medications:   •  apixaban (ELIQUIS) 5 MG tablet tablet, Take 1 tablet by mouth 2 (Two) Times a Day., Disp: 180 tablet, Rfl: 3  •  ferrous sulfate 325 (65 FE) MG tablet, Take 325 mg by mouth 1 (One) Time Per Week., Disp: , Rfl:   •  flecainide (TAMBOCOR) 50 MG tablet, Take 1 tablet by mouth 2 (Two) Times a Day., Disp: 180 tablet, Rfl: 3  •  fluticasone (FLONASE) 50 MCG/ACT nasal spray, 2 sprays into the nostril(s) as directed by provider Daily., Disp: , Rfl:   •  lansoprazole (PREVACID) 30 MG capsule, Take 30 mg by mouth Daily., Disp: , Rfl:   •  lisinopril (PRINIVIL,ZESTRIL) 5 MG tablet, Take 5 mg by mouth Daily., Disp: , Rfl:   •  montelukast (SINGULAIR) 10 MG tablet, Take 10 mg by mouth Every Night., Disp: , Rfl:   •  psyllium (METAMUCIL) 58.6 % packet, Take 1 packet by mouth Daily., Disp: , Rfl:   •  simvastatin (ZOCOR) 40 MG tablet, Take 40 mg by mouth Every Night., Disp: , Rfl:   •  sodium chloride 0.65 % nasal spray, 1 spray into the nostril(s) as directed by provider As Needed for Congestion., Disp: , Rfl:     Vitals:    08/24/20 1054   BP: 114/70   BP Location: Right arm   Patient Position: Sitting   Pulse: (!) 48   Temp: 97.5 °F (36.4 °C)   SpO2: 97%   Weight: 105 kg (231 lb 12.8 oz)   Height: 195.6 cm (77\")     Body mass index is 27.49 " kg/m².    PHYSICAL EXAM:    General Appearance:   · well developed  · well nourished  HENT:   · oropharynx moist  · lips not cyanotic  Neck:  · thyroid not enlarged  · supple  Respiratory:  · no respiratory distress  · normal breath sounds  · no rales  Cardiovascular:  · no jugular venous distention  · Irregular iregular rhythm  · apical impulse normal  · S1 normal, S2 normal  · no S3, no S4   · no murmur  · no rub, no thrill  · carotid pulses normal; no bruit  · pedal pulses normal  · lower extremity edema: none    Gastrointestinal:   · bowel sounds normal  · non-tender  · no hepatomegaly, no splenomegaly  Musculoskeletal:  · no clubbing of fingers.   · normocephalic, head atraumatic  Skin:   · warm, dry  Psychiatric:  · judgement and insight appropriate  · normal mood and affect    RESULTS:     ECG 12 Lead  Date/Time: 8/24/2020 12:07 PM  Performed by: Shaun Parker MD  Authorized by: Shaun Parker MD   Comparison: compared with previous ECG from 6/22/2020  Similar to previous ECG  Rhythm: sinus bradycardia  Rate: bradycardic  BPM: 48  Conduction: incomplete right bundle branch block  Other findings: left ventricular hypertrophy    Clinical impression: abnormal EKG                   Labs:  No results found for: CHOL, TRIG, HDL, LDL, AST, ALT  No results found for: HGBA1C  No components found for: CREATINININE  eGFR Non  Amer   Date Value Ref Range Status   05/06/2020 75 >60 mL/min/1.73 Final         ASSESSMENT:  Problem List Items Addressed This Visit        Cardiovascular and Mediastinum    A-fib (CMS/HCC) - Primary    Overview     5/6/20: successful ECV    2/20/20 Echo  · LVEF 40-50%  · Right ventricular moderately dilated  · Right atrium moderately dilated  · Normal appearing atrial PFO closure device  · No significant valvular abnormalities    1/17/17 Echo  · LVEF >55%  · Normal PFO closure device  · No significant valvular abnormalities          Essential hypertension    Hyperlipidemia LDL goal  <100    Congenital septal defect of heart    Overview     Hx of ASD closure, in [place], approximately 2003         Atrial septal defect    Overview     Percutaneous closure 2003.            Other    History of percutaneous transcatheter closure of congenital ASD    Overview     2003.               PLAN:    1.  Paroxysmal atrial fibrillation:  Successful ECV on 5/6/20  Continue flecainide 50 mg twice daily  Metoprolol discontinued due to bradycardia  Continue Eliquis 5 mg twice daily  CHADSVASC score is 3.  Stable no symptoms currently.    2.  Hypertension:  Home blood pressure log reviewed, well controlled at this time on metoprolol and lisinopril, continue for now.  Well-controlled currently.  Long-term goal blood pressure is 130/80     3.  Hyperlipidemia:  Well-controlled, continue simvastatin 40 mg daily    4.  Decreased exercise tolerance:  Improved symptoms after starting an exercise program approximately 1 week ago  Continue monitoring symptoms if he has recurrence of symptoms I will place a 14-day Holter monitor, repeat echo and proceed with stress test.    5.  Bradycardia:  Avoid all AV sarah blocking agents  Currently asymptomatic  14-day Holter if he has symptoms concerning for symptomatic bradycardia or heart block.    Return to clinic in 6 months or sooner as needed.    Thank you for the opportunity to share in the care of your patient; please do not hesitate to call me with any questions.     Shaun Parker MD, [facility]  Office: [phone] [address] [facility]  [address] [place] [address]

## 2020-09-28 RX ORDER — LISINOPRIL 5 MG/1
5 TABLET ORAL DAILY
Qty: 90 TABLET | Refills: 3 | Status: SHIPPED | OUTPATIENT
Start: 2020-09-28 | End: 2021-10-06

## 2021-03-05 PROBLEM — R00.1 BRADYCARDIA: Status: ACTIVE | Noted: 2021-03-05

## 2021-03-05 NOTE — PROGRESS NOTES
OFFICE VISIT  NOTE  [facility] CARDIOLOGY      Name: Shaun Tyler    Date: 3/8/2021  MRN:  [id number]  :  1945      REFERRING/PRIMARY PROVIDER:  Alejandro Richard PA-C    Chief Complaint   Patient presents with   • Paroxysmal atrial fibrillation (CMS/HCC)       HPI: Shaun Tyler is a 76 y.o. male who presents today for follow-up for a-fib.  Associated history of percutaneous ASD closure in , hypertension, hyperlipidemia.  Work-up at  2020 includes echocardiogram showing EF of 40 to 50%, mild LA enlargement, moderate RA enlargement, normal pulmonary pressures, ASD closure device in place.   Started flecainide 3/23/2020, underwent successful external cardioversion 2020.  Still reports decreased exercise tolerance, shortness of breath with exertion.  Working out 3 times per week, walks approximately 3 miles in 1 hour and does weights for an additional hour.  Has to stop during walking and during weights to catch his breath.  Denies chest pain or palpitations.  Average heart rate at home mid 70s to mid 80s.    Past Medical History:   Diagnosis Date   • A-fib (CMS/HCC)    • Atrial septal defect    • Chicken pox    • Hiatal hernia    • Measles    • Mumps    • S/P patch closure of atrial septal defect        Past Surgical History:   Procedure Laterality Date   • ATRIAL SEPTAL DEFECT REPAIR         Social History     Socioeconomic History   • Marital status:      Spouse name: Not on file   • Number of children: Not on file   • Years of education: Not on file   • Highest education level: Not on file   Tobacco Use   • Smoking status: Never Smoker   • Smokeless tobacco: Never Used   Vaping Use   • Vaping Use: Never used   Substance and Sexual Activity   • Alcohol use: Not Currently   • Drug use: Never   • Sexual activity: Defer       Family History   Problem Relation Age of Onset   • Cancer Mother    • Pneumonia Sister         ROS:   Constitutional no fever,  no  "weight loss   Skin no rash, no subcutaneous nodules   Otolaryngeal no difficulty swallowing   Cardiovascular See HPI   Pulmonary no cough, no sputum production   Gastrointestinal no constipation, no diarrhea   Genitourinary no dysuria, no hematuria   Hematologic no easy bruisability, no abnormal bleeding   Musculoskeletal no muscle pain   Neurologic no dizziness, no falls         Allergies   Allergen Reactions   • Penicillins Itching         Current Outpatient Medications:   •  apixaban (ELIQUIS) 5 MG tablet tablet, Take 1 tablet by mouth 2 (Two) Times a Day., Disp: 180 tablet, Rfl: 3  •  ferrous sulfate 325 (65 FE) MG tablet, Take 325 mg by mouth 1 (One) Time Per Week., Disp: , Rfl:   •  flecainide (TAMBOCOR) 50 MG tablet, Take 1 tablet by mouth 2 (Two) Times a Day., Disp: 180 tablet, Rfl: 3  •  fluticasone (FLONASE) 50 MCG/ACT nasal spray, 2 sprays into the nostril(s) as directed by provider Daily., Disp: , Rfl:   •  lansoprazole (PREVACID) 30 MG capsule, Take 30 mg by mouth Daily., Disp: , Rfl:   •  lisinopril (PRINIVIL,ZESTRIL) 5 MG tablet, Take 1 tablet by mouth Daily., Disp: 90 tablet, Rfl: 3  •  montelukast (SINGULAIR) 10 MG tablet, Take 10 mg by mouth Every Night., Disp: , Rfl:   •  psyllium (METAMUCIL) 58.6 % packet, Take 1 packet by mouth Daily., Disp: , Rfl:   •  simvastatin (ZOCOR) 40 MG tablet, Take 40 mg by mouth Every Night., Disp: , Rfl:     Vitals:    03/08/21 1046   BP: 100/70   BP Location: Right arm   Patient Position: Sitting   Pulse: 53   SpO2: 96%   Weight: 108 kg (239 lb)   Height: 195.6 cm (77\")     Body mass index is 28.34 kg/m².    PHYSICAL EXAM:    General Appearance:   · well developed  · well nourished  HENT:   · oropharynx moist  · lips not cyanotic  Neck:  · thyroid not enlarged  · supple  Respiratory:  · no respiratory distress  · normal breath sounds  · no rales  Cardiovascular:  · no jugular venous distention  · Irregular iregular rhythm  · apical impulse normal  · S1 normal, S2 " normal  · no S3, no S4   · no murmur  · no rub, no thrill  · carotid pulses normal; no bruit  · pedal pulses normal  · lower extremity edema: none    Gastrointestinal:   · bowel sounds normal  · non-tender  · no hepatomegaly, no splenomegaly  Musculoskeletal:  · no clubbing of fingers.   · normocephalic, head atraumatic  Skin:   · warm, dry  Psychiatric:  · judgement and insight appropriate  · normal mood and affect    RESULTS:     ECG 12 Lead    Date/Time: 3/8/2021 11:13 AM  Performed by: Shaun Parker MD  Authorized by: Shaun Parker MD   Comparison: compared with previous ECG from 8/24/2020  Similar to previous ECG  Rhythm: sinus rhythm  Rate: normal  QRS axis: normal    Clinical impression: non-specific ECG  Comments: Stable, no change since previous.                  Labs:  No results found for: CHOL, TRIG, HDL, LDL, AST, ALT  No results found for: HGBA1C  No components found for: CREATINININE  eGFR Non  Amer   Date Value Ref Range Status   05/06/2020 75 >60 mL/min/1.73 Final         ASSESSMENT:  Problem List Items Addressed This Visit        Cardiac and Vasculature    A-fib (CMS/HCC) - Primary    Overview     5/6/20: successful ECV    2/20/20 Echo  · LVEF 40-50%  · Right ventricular moderately dilated  · Right atrium moderately dilated  · Normal appearing atrial PFO closure device  · No significant valvular abnormalities    1/17/17 Echo  · LVEF >55%  · Normal PFO closure device  · No significant valvular abnormalities          Essential hypertension    Hyperlipidemia LDL goal <100    Bradycardia          PLAN:    1.  Paroxysmal atrial fibrillation:  Successful ECV on 5/6/20  Continue flecainide 50 mg twice daily  Metoprolol discontinued due to bradycardia  Continue Eliquis 5 mg twice daily  CHADSVASC score is 3.    2.  Hypertension:  Bring blood pressure log to next visit  Long-term goal blood pressure is 130/80     3.  Hyperlipidemia:  Well-controlled, continue simvastatin 40 mg daily    4.   Decreased exercise tolerance:  Proceed with echocardiogram and stress test for further risk ratification.    5.  Bradycardia:  Avoid all AV sarah blocking agents  Currently asymptomatic  He has an apple watch, average heart rate at home 75-84    Return to clinic in 6 months with EKG or sooner as needed.    Thank you for the opportunity to share in the care of your patient; please do not hesitate to call me with any questions.     Shaun Parker MD, [facility]  Office: [phone]  [facility] [place] [address]

## 2021-03-08 ENCOUNTER — OFFICE VISIT (OUTPATIENT)
Dept: CARDIOLOGY | Facility: CLINIC | Age: 76
End: 2021-03-08

## 2021-03-08 VITALS
DIASTOLIC BLOOD PRESSURE: 70 MMHG | SYSTOLIC BLOOD PRESSURE: 100 MMHG | HEIGHT: 77 IN | BODY MASS INDEX: 28.22 KG/M2 | OXYGEN SATURATION: 96 % | HEART RATE: 53 BPM | WEIGHT: 239 LBS

## 2021-03-08 DIAGNOSIS — E78.5 HYPERLIPIDEMIA LDL GOAL <100: ICD-10-CM

## 2021-03-08 DIAGNOSIS — I10 ESSENTIAL HYPERTENSION: ICD-10-CM

## 2021-03-08 DIAGNOSIS — I48.0 PAROXYSMAL ATRIAL FIBRILLATION (HCC): Primary | ICD-10-CM

## 2021-03-08 DIAGNOSIS — R06.02 SOB (SHORTNESS OF BREATH): ICD-10-CM

## 2021-03-08 DIAGNOSIS — R00.1 BRADYCARDIA: ICD-10-CM

## 2021-03-08 DIAGNOSIS — R07.9 CHEST PAIN, UNSPECIFIED TYPE: ICD-10-CM

## 2021-03-08 PROCEDURE — 99214 OFFICE O/P EST MOD 30 MIN: CPT | Performed by: INTERNAL MEDICINE

## 2021-03-08 PROCEDURE — 93000 ELECTROCARDIOGRAM COMPLETE: CPT | Performed by: INTERNAL MEDICINE

## 2021-04-16 ENCOUNTER — APPOINTMENT (OUTPATIENT)
Dept: PREADMISSION TESTING | Facility: HOSPITAL | Age: 76
End: 2021-04-16

## 2021-04-16 PROCEDURE — U0004 COV-19 TEST NON-CDC HGH THRU: HCPCS

## 2021-04-16 PROCEDURE — C9803 HOPD COVID-19 SPEC COLLECT: HCPCS

## 2021-04-17 LAB — SARS-COV-2 RNA NOSE QL NAA+PROBE: NOT DETECTED

## 2021-04-19 ENCOUNTER — HOSPITAL ENCOUNTER (OUTPATIENT)
Dept: CARDIOLOGY | Facility: HOSPITAL | Age: 76
Discharge: HOME | End: 2021-04-19
Admitting: INTERNAL MEDICINE

## 2021-04-19 ENCOUNTER — HOSPITAL ENCOUNTER (OUTPATIENT)
Dept: CARDIOLOGY | Facility: HOSPITAL | Age: 76
Discharge: HOME | End: 2021-04-19

## 2021-04-19 VITALS — WEIGHT: 239 LBS | BODY MASS INDEX: 28.22 KG/M2 | HEIGHT: 77 IN

## 2021-04-19 DIAGNOSIS — R06.02 SOB (SHORTNESS OF BREATH): ICD-10-CM

## 2021-04-19 DIAGNOSIS — I48.0 PAROXYSMAL ATRIAL FIBRILLATION (HCC): ICD-10-CM

## 2021-04-19 DIAGNOSIS — R07.9 CHEST PAIN, UNSPECIFIED TYPE: ICD-10-CM

## 2021-04-19 PROCEDURE — 93306 TTE W/DOPPLER COMPLETE: CPT | Performed by: INTERNAL MEDICINE

## 2021-04-19 PROCEDURE — 93306 TTE W/DOPPLER COMPLETE: CPT

## 2021-04-20 LAB
ASCENDING AORTA: 3.4 CM
BH CV ECHO MEAS - AO MAX PG (FULL): -0.08 MMHG
BH CV ECHO MEAS - AO MAX PG: 4.4 MMHG
BH CV ECHO MEAS - AO ROOT AREA (BSA CORRECTED): 1.4
BH CV ECHO MEAS - AO ROOT AREA: 9.1 CM^2
BH CV ECHO MEAS - AO ROOT DIAM: 3.4 CM
BH CV ECHO MEAS - AO V2 MAX: 105 CM/SEC
BH CV ECHO MEAS - ASC AORTA: 3.4 CM
BH CV ECHO MEAS - AVA(V,A): 2.6 CM^2
BH CV ECHO MEAS - AVA(V,D): 2.6 CM^2
BH CV ECHO MEAS - BSA(HAYCOCK): 2.4 M^2
BH CV ECHO MEAS - BSA: 2.4 M^2
BH CV ECHO MEAS - BZI_BMI: 28.3 KILOGRAMS/M^2
BH CV ECHO MEAS - BZI_METRIC_HEIGHT: 195.6 CM
BH CV ECHO MEAS - BZI_METRIC_WEIGHT: 108.4 KG
BH CV ECHO MEAS - EDV(CUBED): 57.5 ML
BH CV ECHO MEAS - EDV(MOD-SP2): 70 ML
BH CV ECHO MEAS - EDV(MOD-SP4): 70 ML
BH CV ECHO MEAS - EDV(TEICH): 64.3 ML
BH CV ECHO MEAS - EF(CUBED): 22.2 %
BH CV ECHO MEAS - EF(MOD-BP): 69 %
BH CV ECHO MEAS - EF(MOD-SP2): 70 %
BH CV ECHO MEAS - EF(MOD-SP4): 67.1 %
BH CV ECHO MEAS - EF(TEICH): 18.2 %
BH CV ECHO MEAS - ESV(CUBED): 44.7 ML
BH CV ECHO MEAS - ESV(MOD-SP2): 21 ML
BH CV ECHO MEAS - ESV(MOD-SP4): 23 ML
BH CV ECHO MEAS - ESV(TEICH): 52.6 ML
BH CV ECHO MEAS - FS: 8 %
BH CV ECHO MEAS - IVS/LVPW: 1.1
BH CV ECHO MEAS - IVSD: 0.99 CM
BH CV ECHO MEAS - LA DIMENSION: 4.5 CM
BH CV ECHO MEAS - LA/AO: 1.3
BH CV ECHO MEAS - LAD MAJOR: 6.7 CM
BH CV ECHO MEAS - LAT PEAK E' VEL: 11.7 CM/SEC
BH CV ECHO MEAS - LATERAL E/E' RATIO: 6.7
BH CV ECHO MEAS - LV DIASTOLIC VOL/BSA (35-75): 29 ML/M^2
BH CV ECHO MEAS - LV IVRT: 0.11 SEC
BH CV ECHO MEAS - LV MASS(C)D: 111.7 GRAMS
BH CV ECHO MEAS - LV MASS(C)DI: 46.3 GRAMS/M^2
BH CV ECHO MEAS - LV MAX PG: 4.5 MMHG
BH CV ECHO MEAS - LV MEAN PG: 2 MMHG
BH CV ECHO MEAS - LV SYSTOLIC VOL/BSA (12-30): 9.5 ML/M^2
BH CV ECHO MEAS - LV V1 MAX: 106 CM/SEC
BH CV ECHO MEAS - LV V1 MEAN: 70 CM/SEC
BH CV ECHO MEAS - LV V1 VTI: 25.8 CM
BH CV ECHO MEAS - LVIDD: 3.9 CM
BH CV ECHO MEAS - LVIDS: 3.6 CM
BH CV ECHO MEAS - LVLD AP2: 7.9 CM
BH CV ECHO MEAS - LVLD AP4: 7.5 CM
BH CV ECHO MEAS - LVLS AP2: 6.5 CM
BH CV ECHO MEAS - LVLS AP4: 6.4 CM
BH CV ECHO MEAS - LVOT AREA (M): 2.5 CM^2
BH CV ECHO MEAS - LVOT AREA: 2.5 CM^2
BH CV ECHO MEAS - LVOT DIAM: 1.8 CM
BH CV ECHO MEAS - LVPWD: 0.91 CM
BH CV ECHO MEAS - MED PEAK E' VEL: 5.7 CM/SEC
BH CV ECHO MEAS - MEDIAL E/E' RATIO: 13.8
BH CV ECHO MEAS - MV A MAX VEL: 56.9 CM/SEC
BH CV ECHO MEAS - MV DEC TIME: 0.32 SEC
BH CV ECHO MEAS - MV E MAX VEL: 78.8 CM/SEC
BH CV ECHO MEAS - MV E/A: 1.4
BH CV ECHO MEAS - PA ACC SLOPE: 318 CM/SEC^2
BH CV ECHO MEAS - PA ACC TIME: 0.17 SEC
BH CV ECHO MEAS - PA MAX PG: 2.6 MMHG
BH CV ECHO MEAS - PA PR(ACCEL): 1.2 MMHG
BH CV ECHO MEAS - PA V2 MAX: 79.8 CM/SEC
BH CV ECHO MEAS - RAP SYSTOLE: 3 MMHG
BH CV ECHO MEAS - RVSP: 26 MMHG
BH CV ECHO MEAS - SI(CUBED): 5.3 ML/M^2
BH CV ECHO MEAS - SI(LVOT): 27.2 ML/M^2
BH CV ECHO MEAS - SI(MOD-SP2): 20.3 ML/M^2
BH CV ECHO MEAS - SI(MOD-SP4): 19.5 ML/M^2
BH CV ECHO MEAS - SI(TEICH): 4.8 ML/M^2
BH CV ECHO MEAS - SV(CUBED): 12.8 ML
BH CV ECHO MEAS - SV(LVOT): 65.7 ML
BH CV ECHO MEAS - SV(MOD-SP2): 49 ML
BH CV ECHO MEAS - SV(MOD-SP4): 47 ML
BH CV ECHO MEAS - SV(TEICH): 11.7 ML
BH CV ECHO MEAS - TAPSE (>1.6): 2.7 CM
BH CV ECHO MEAS - TR MAX PG: 23 MMHG
BH CV ECHO MEAS - TR MAX VEL: 242 CM/SEC
BH CV ECHO MEASUREMENTS AVERAGE E/E' RATIO: 9.06
BH CV VAS BP RIGHT ARM: NORMAL MMHG
BH CV XLRA - RV BASE: 4.1 CM
BH CV XLRA - RV LENGTH: 7.8 CM
BH CV XLRA - RV MID: 3.1 CM
BH CV XLRA - TDI S': 11.3 CM/SEC
IVRT: 109 MSEC
LEFT ATRIUM VOLUME INDEX: 26.9 ML/M^2
LEFT ATRIUM VOLUME: 65 ML
MAXIMAL PREDICTED HEART RATE: 146 BPM
STRESS TARGET HR: 124 BPM

## 2021-04-21 ENCOUNTER — TELEPHONE (OUTPATIENT)
Dept: CARDIOLOGY | Facility: CLINIC | Age: 76
End: 2021-04-21

## 2021-04-21 NOTE — TELEPHONE ENCOUNTER
Spoke with pt regarding echo results, he will have stress test on Monday. No further questions at this time.

## 2021-04-21 NOTE — TELEPHONE ENCOUNTER
----- Message from Shaun Parker MD sent at 4/20/2021  6:23 PM EDT -----  Please inform the patient of their test results. Thank you.

## 2021-04-23 ENCOUNTER — APPOINTMENT (OUTPATIENT)
Dept: PREADMISSION TESTING | Facility: HOSPITAL | Age: 76
End: 2021-04-23

## 2021-04-23 LAB — SARS-COV-2 RNA NOSE QL NAA+PROBE: NOT DETECTED

## 2021-04-23 PROCEDURE — U0004 COV-19 TEST NON-CDC HGH THRU: HCPCS

## 2021-04-23 PROCEDURE — C9803 HOPD COVID-19 SPEC COLLECT: HCPCS

## 2021-04-26 ENCOUNTER — HOSPITAL ENCOUNTER (OUTPATIENT)
Dept: CARDIOLOGY | Facility: HOSPITAL | Age: 76
Discharge: HOME | End: 2021-04-26
Admitting: INTERNAL MEDICINE

## 2021-04-26 VITALS
HEART RATE: 45 BPM | DIASTOLIC BLOOD PRESSURE: 80 MMHG | BODY MASS INDEX: 28.11 KG/M2 | WEIGHT: 238.1 LBS | HEIGHT: 77 IN | SYSTOLIC BLOOD PRESSURE: 114 MMHG

## 2021-04-26 DIAGNOSIS — R07.9 CHEST PAIN, UNSPECIFIED TYPE: ICD-10-CM

## 2021-04-26 DIAGNOSIS — I48.0 PAROXYSMAL ATRIAL FIBRILLATION (HCC): ICD-10-CM

## 2021-04-26 DIAGNOSIS — R06.02 SOB (SHORTNESS OF BREATH): ICD-10-CM

## 2021-04-26 PROCEDURE — 0 TECHNETIUM SESTAMIBI: Performed by: INTERNAL MEDICINE

## 2021-04-26 PROCEDURE — A9500 TC99M SESTAMIBI: HCPCS | Performed by: INTERNAL MEDICINE

## 2021-04-26 PROCEDURE — 78452 HT MUSCLE IMAGE SPECT MULT: CPT

## 2021-04-26 PROCEDURE — 93017 CV STRESS TEST TRACING ONLY: CPT

## 2021-04-26 PROCEDURE — 78452 HT MUSCLE IMAGE SPECT MULT: CPT | Performed by: INTERNAL MEDICINE

## 2021-04-26 PROCEDURE — 93018 CV STRESS TEST I&R ONLY: CPT | Performed by: INTERNAL MEDICINE

## 2021-04-26 RX ADMIN — TECHNETIUM TC 99M SESTAMIBI 1 DOSE: 1 INJECTION INTRAVENOUS at 13:15

## 2021-04-26 RX ADMIN — TECHNETIUM TC 99M SESTAMIBI 1 DOSE: 1 INJECTION INTRAVENOUS at 15:25

## 2021-04-27 LAB
BH CV REST NUCLEAR ISOTOPE DOSE: 8.8 MCI
BH CV STRESS BP STAGE 1: NORMAL
BH CV STRESS BP STAGE 2: NORMAL
BH CV STRESS DURATION MIN STAGE 1: 3
BH CV STRESS DURATION MIN STAGE 2: 3
BH CV STRESS DURATION MIN STAGE 3: 1
BH CV STRESS DURATION SEC STAGE 1: 0
BH CV STRESS DURATION SEC STAGE 2: 0
BH CV STRESS DURATION SEC STAGE 3: 15
BH CV STRESS GRADE STAGE 1: 10
BH CV STRESS GRADE STAGE 2: 12
BH CV STRESS GRADE STAGE 3: 14
BH CV STRESS HR STAGE 1: 97
BH CV STRESS HR STAGE 2: 118
BH CV STRESS HR STAGE 3: 124
BH CV STRESS METS STAGE 1: 5
BH CV STRESS METS STAGE 2: 7.5
BH CV STRESS METS STAGE 3: 10
BH CV STRESS NUCLEAR ISOTOPE DOSE: 29.3 MCI
BH CV STRESS O2 STAGE 1: 96
BH CV STRESS O2 STAGE 2: 95
BH CV STRESS O2 STAGE 3: 95
BH CV STRESS PROTOCOL 1: NORMAL
BH CV STRESS RECOVERY BP: NORMAL MMHG
BH CV STRESS RECOVERY HR: 72 BPM
BH CV STRESS RECOVERY O2: 99 %
BH CV STRESS SPEED STAGE 1: 1.7
BH CV STRESS SPEED STAGE 2: 2.5
BH CV STRESS SPEED STAGE 3: 3.4
BH CV STRESS STAGE 1: 1
BH CV STRESS STAGE 2: 2
BH CV STRESS STAGE 3: 3
LV EF NUC BP: 85 %
MAXIMAL PREDICTED HEART RATE: 146 BPM
PERCENT MAX PREDICTED HR: 86.3 %
STRESS BASELINE BP: NORMAL MMHG
STRESS BASELINE HR: 43 BPM
STRESS O2 SAT REST: 96 %
STRESS PERCENT HR: 102 %
STRESS POST ESTIMATED WORKLOAD: 7.2 METS
STRESS POST EXERCISE DUR MIN: 7 MIN
STRESS POST EXERCISE DUR SEC: 15 SEC
STRESS POST O2 SAT PEAK: 95 %
STRESS POST PEAK BP: NORMAL MMHG
STRESS POST PEAK HR: 126 BPM
STRESS TARGET HR: 124 BPM

## 2021-04-28 ENCOUNTER — TELEPHONE (OUTPATIENT)
Dept: CARDIOLOGY | Facility: CLINIC | Age: 76
End: 2021-04-28

## 2021-04-28 NOTE — TELEPHONE ENCOUNTER
----- Message from Shaun Parker MD sent at 4/27/2021  5:58 PM EDT -----  Please inform the patient of their test results. Thank you.

## 2021-07-21 RX ORDER — APIXABAN 5 MG/1
TABLET, FILM COATED ORAL
Qty: 180 TABLET | Refills: 3 | Status: SHIPPED | OUTPATIENT
Start: 2021-07-21 | End: 2022-08-08

## 2021-09-14 RX ORDER — FLECAINIDE ACETATE 50 MG/1
TABLET ORAL
Qty: 180 TABLET | Refills: 3 | Status: SHIPPED | OUTPATIENT
Start: 2021-09-14 | End: 2022-07-11 | Stop reason: SDUPTHER

## 2021-10-06 RX ORDER — LISINOPRIL 5 MG/1
TABLET ORAL
Qty: 90 TABLET | Refills: 3 | Status: SHIPPED | OUTPATIENT
Start: 2021-10-06 | End: 2021-12-13

## 2021-10-19 PROCEDURE — U0004 COV-19 TEST NON-CDC HGH THRU: HCPCS | Performed by: NURSE PRACTITIONER

## 2021-10-26 PROCEDURE — U0004 COV-19 TEST NON-CDC HGH THRU: HCPCS | Performed by: NURSE PRACTITIONER

## 2021-12-13 ENCOUNTER — OFFICE VISIT (OUTPATIENT)
Dept: CARDIOLOGY | Facility: CLINIC | Age: 76
End: 2021-12-13

## 2021-12-13 VITALS
OXYGEN SATURATION: 96 % | SYSTOLIC BLOOD PRESSURE: 98 MMHG | BODY MASS INDEX: 26.78 KG/M2 | DIASTOLIC BLOOD PRESSURE: 62 MMHG | HEIGHT: 77 IN | WEIGHT: 226.8 LBS | HEART RATE: 64 BPM

## 2021-12-13 DIAGNOSIS — Q21.9 CONGENITAL SEPTAL DEFECT OF HEART: ICD-10-CM

## 2021-12-13 DIAGNOSIS — E78.5 HYPERLIPIDEMIA LDL GOAL <100: Primary | ICD-10-CM

## 2021-12-13 DIAGNOSIS — I48.0 PAROXYSMAL ATRIAL FIBRILLATION (HCC): ICD-10-CM

## 2021-12-13 DIAGNOSIS — R06.02 SOB (SHORTNESS OF BREATH): ICD-10-CM

## 2021-12-13 DIAGNOSIS — Q21.10 ATRIAL SEPTAL DEFECT: ICD-10-CM

## 2021-12-13 DIAGNOSIS — I10 ESSENTIAL HYPERTENSION: ICD-10-CM

## 2021-12-13 PROCEDURE — 99214 OFFICE O/P EST MOD 30 MIN: CPT | Performed by: INTERNAL MEDICINE

## 2021-12-13 NOTE — PROGRESS NOTES
OFFICE VISIT  NOTE  [facility] CARDIOLOGY      Name: Shaun Tyler    Date: 2021  MRN:  [id number]  :  1945      REFERRING/PRIMARY PROVIDER:  Alejandro Richard PA-C    Chief Complaint   Patient presents with   • Atrial Fibrillation   • Shortness of Breath       HPI: Shaun Tyler is a 76 y.o. male who presents today for follow-up for a-fib.  Associated history of percutaneous ASD closure in , hypertension, hyperlipidemia.  Work-up at  2020 includes echocardiogram showing EF of 40 to 50%, mild LA enlargement, moderate RA enlargement, normal pulmonary pressures, ASD closure device in place.   Started flecainide 3/23/2020, underwent successful external cardioversion 2020.  Low risk stress test and echo 2021  Main complaint today is leg weakness progressive, also has dizziness upon standing up    Past Medical History:   Diagnosis Date   • A-fib (HCC)    • Atrial septal defect    • Chicken pox    • Hiatal hernia    • Measles    • Mumps    • S/P patch closure of atrial septal defect        Past Surgical History:   Procedure Laterality Date   • ATRIAL SEPTAL DEFECT REPAIR         Social History     Socioeconomic History   • Marital status:    Tobacco Use   • Smoking status: Never Smoker   • Smokeless tobacco: Never Used   Vaping Use   • Vaping Use: Never used   Substance and Sexual Activity   • Alcohol use: Not Currently   • Drug use: Never   • Sexual activity: Defer       Family History   Problem Relation Age of Onset   • Cancer Mother    • Pneumonia Sister         ROS:   Constitutional no fever,  no weight loss   Skin no rash, no subcutaneous nodules   Otolaryngeal no difficulty swallowing   Cardiovascular See HPI   Pulmonary no cough, no sputum production   Gastrointestinal no constipation, no diarrhea   Genitourinary no dysuria, no hematuria   Hematologic no easy bruisability, no abnormal bleeding   Musculoskeletal no muscle pain   Neurologic no  "dizziness, no falls         Allergies   Allergen Reactions   • Penicillins Itching         Current Outpatient Medications:   •  Eliquis 5 MG tablet tablet, TAKE ONE TABLET BY MOUTH TWICE A DAY, Disp: 180 tablet, Rfl: 3  •  ferrous sulfate 325 (65 FE) MG tablet, Take 325 mg by mouth 1 (One) Time Per Week., Disp: , Rfl:   •  flecainide (TAMBOCOR) 50 MG tablet, TAKE ONE TABLET BY MOUTH TWICE A DAY, Disp: 180 tablet, Rfl: 3  •  fluticasone (FLONASE) 50 MCG/ACT nasal spray, 2 sprays into the nostril(s) as directed by provider Daily., Disp: , Rfl:   •  lansoprazole (PREVACID) 30 MG capsule, Take 30 mg by mouth Daily., Disp: , Rfl:   •  montelukast (SINGULAIR) 10 MG tablet, Take 10 mg by mouth Every Night., Disp: , Rfl:   •  psyllium (METAMUCIL) 58.6 % packet, Take 1 packet by mouth Daily., Disp: , Rfl:   •  simvastatin (ZOCOR) 40 MG tablet, Take 40 mg by mouth Every Night., Disp: , Rfl:     Vitals:    12/13/21 1431   BP: 98/62   BP Location: Right arm   Patient Position: Sitting   Pulse: 64   SpO2: 96%   Weight: 103 kg (226 lb 12.8 oz)   Height: 195.6 cm (77\")     Body mass index is 26.89 kg/m².    PHYSICAL EXAM:    General Appearance:   · well developed  · well nourished  HENT:   · oropharynx moist  · lips not cyanotic  Neck:  · thyroid not enlarged  · supple  Respiratory:  · no respiratory distress  · normal breath sounds  · no rales  Cardiovascular:  · no jugular venous distention  · Irregular iregular rhythm  · apical impulse normal  · S1 normal, S2 normal  · no S3, no S4   · no murmur  · no rub, no thrill  · carotid pulses normal; no bruit  · pedal pulses normal  · lower extremity edema: none    Gastrointestinal:   · bowel sounds normal  · non-tender  · no hepatomegaly, no splenomegaly  Musculoskeletal:  · no clubbing of fingers.   · normocephalic, head atraumatic  Skin:   · warm, dry  Psychiatric:  · judgement and insight appropriate  · normal mood and affect    RESULTS:   Procedures    Results for orders placed " during the hospital encounter of 04/19/21    Adult Transthoracic Echo Complete W/ Cont if Necessary Per Protocol    Interpretation Summary  · Left ventricular ejection fraction appears to be 61 - 65%. Left ventricular systolic function is normal.  · Estimated right ventricular systolic pressure from tricuspid regurgitation is normal (<35 mmHg). Calculated right ventricular systolic pressure from tricuspid regurgitation is 26 mmHg.  · Left ventricular diastolic function was normal.  · No significant structural or functional valvular disease.        Labs:  No results found for: CHOL, TRIG, HDL, LDL, AST, ALT  No results found for: HGBA1C  No components found for: CREATINININE  eGFR Non  Amer   Date Value Ref Range Status   05/06/2020 75 >60 mL/min/1.73 Final         ASSESSMENT:  Problem List Items Addressed This Visit        Cardiac and Vasculature    A-fib ([facility])    Overview     5/6/20: successful ECV    2/20/20 Echo  · LVEF 40-50%  · Right ventricular moderately dilated  · Right atrium moderately dilated  · Normal appearing atrial PFO closure device  · No significant valvular abnormalities    1/17/17 Echo  · LVEF >55%  · Normal PFO closure device  · No significant valvular abnormalities          Essential hypertension    Hyperlipidemia LDL goal <100 - Primary    Congenital septal defect of heart    Overview     Hx of ASD closure, in [place], approximately 2003         Atrial septal defect    Overview     Percutaneous closure 2003.            Pulmonary and Pneumonias    SOB (shortness of breath)    Overview     4/19/21 Echo: LVEF 61-65%, RVSP 26 mmHg, structurally normal  4/19/21 Low risk stress test                   PLAN:    1.  Paroxysmal atrial fibrillation:  Successful ECV on 5/6/20  Continue flecainide 50 mg twice daily  Metoprolol discontinued due to bradycardia  Continue Eliquis 5 mg twice daily  CHADSVASC score is 3.    If he has persistent dizziness we will decrease flecainide to 25 mg twice  daily    2.  Hypertension:  Bring blood pressure log to next visit  Long-term goal blood pressure is 130/80   Low blood pressure today, discontinue lisinopril    3.  Hyperlipidemia:  Well-controlled, continue simvastatin 40 mg daily    4.  Decreased exercise tolerance:  Echocardiogram 4/19/21 LVEF 61-65%, no valvular disease  Stress test 4/19/21 no evidence of ischemia     5.  Bradycardia:  Avoid all AV sarah blocking agents  Currently asymptomatic  He has an apple watch, average heart rate at home 75-84     6.  Leg weakness:  Stop Zocor for 3 weeks, if it does not help leg weakness, I would refer to neurology    Return to clinic in 6 months with EKG or sooner as needed.    Thank you for the opportunity to share in the care of your patient; please do not hesitate to call me with any questions.     Shaun Parker MD, [facility]  Office: [phone] [address] [place] [address]

## 2021-12-23 ENCOUNTER — TELEPHONE (OUTPATIENT)
Dept: CARDIOLOGY | Facility: CLINIC | Age: 76
End: 2021-12-23

## 2021-12-23 NOTE — TELEPHONE ENCOUNTER
Pt LVM stating he is having dizziness with little exertion. Returned pt call and no answer. Will await pt return call. We will most likely decrease his flecainide to 25 mg per RDS VALENTIN on 12/13.    Spoke with pt- he will decrease flecainide and update if dizziness not improved. Advised to increase hydration and limit alcohol and caffeine. Pt amendable to plan and verbalized understanding.     HR   -[phone]

## 2021-12-27 NOTE — TELEPHONE ENCOUNTER
Pt calls today with an update on dizziness. Says it has improved a little bit. HR ranges  the highest was 111 on 12/22 and it has been under 100 since then. He is concerned about still being in afib. Advised to continue flecainide 25 mg BID and moved apt up to 1/10 at 10:45 am.

## 2022-07-08 NOTE — PROGRESS NOTES
OFFICE VISIT  NOTE  [facility] CARDIOLOGY      Name: Shaun Tyler    Date: 2022  MRN:  [id number]  :  1945      REFERRING/PRIMARY PROVIDER:  Alejandro Richard PA-C    Chief Complaint   Patient presents with   • Paroxysmal atrial fibrillation (CMS/HCC)       HPI: Shaun Tyler is a 77 y.o. male who presents today for follow-up for a-fib.  Associated history of percutaneous ASD closure in , hypertension, hyperlipidemia.  Work-up at  2020 includes echocardiogram showing EF of 40 to 50%, mild LA enlargement, moderate RA enlargement, normal pulmonary pressures, ASD closure device in place.   Started flecainide 3/23/2020, underwent successful external cardioversion 2020.  Low risk stress test and echo 2021  Back in A. fib over the last 4-5 months, had some dizziness, flecainide was decreased to help with dizziness but did not, he has been weak, legs still weak.  He did not stop simvastatin after last visit.    Past Medical History:   Diagnosis Date   • A-fib (HCC)    • Atrial septal defect    • Chicken pox    • Hiatal hernia    • Measles    • Mumps    • S/P patch closure of atrial septal defect        Past Surgical History:   Procedure Laterality Date   • ATRIAL SEPTAL DEFECT REPAIR         Social History     Socioeconomic History   • Marital status:    Tobacco Use   • Smoking status: Never Smoker   • Smokeless tobacco: Never Used   Vaping Use   • Vaping Use: Never used   Substance and Sexual Activity   • Alcohol use: Not Currently   • Drug use: Never   • Sexual activity: Defer       Family History   Problem Relation Age of Onset   • Cancer Mother    • Pneumonia Sister         ROS:   Constitutional no fever,  no weight loss   Skin no rash, no subcutaneous nodules   Otolaryngeal no difficulty swallowing   Cardiovascular See HPI   Pulmonary no cough, no sputum production   Gastrointestinal no constipation, no diarrhea   Genitourinary no dysuria, no  "hematuria   Hematologic no easy bruisability, no abnormal bleeding   Musculoskeletal no muscle pain   Neurologic no dizziness, no falls         Allergies   Allergen Reactions   • Penicillins Itching         Current Outpatient Medications:   •  Eliquis 5 MG tablet tablet, TAKE ONE TABLET BY MOUTH TWICE A DAY, Disp: 180 tablet, Rfl: 3  •  ferrous sulfate 325 (65 FE) MG tablet, Take 325 mg by mouth 1 (One) Time Per Week., Disp: , Rfl:   •  flecainide (TAMBOCOR) 50 MG tablet, TAKE ONE TABLET BY MOUTH TWICE A DAY (Patient taking differently: 25 mg.), Disp: 180 tablet, Rfl: 3  •  fluticasone (FLONASE) 50 MCG/ACT nasal spray, 2 sprays into the nostril(s) as directed by provider Daily., Disp: , Rfl:   •  lansoprazole (PREVACID) 30 MG capsule, Take 30 mg by mouth Daily., Disp: , Rfl:   •  psyllium (METAMUCIL) 58.6 % packet, Take 1 packet by mouth Daily., Disp: , Rfl:   •  simvastatin (ZOCOR) 40 MG tablet, Take 40 mg by mouth Every Night., Disp: , Rfl:     Vitals:    07/11/22 1020   BP: 120/78   BP Location: Right arm   Patient Position: Sitting   Pulse: (!) 121   SpO2: 96%   Weight: 107 kg (236 lb 6.4 oz)   Height: 195.6 cm (77\")     Body mass index is 28.03 kg/m².    PHYSICAL EXAM:    General Appearance:   · well developed  · well nourished  HENT:   · oropharynx moist  · lips not cyanotic  Neck:  · thyroid not enlarged  · supple  Respiratory:  · no respiratory distress  · normal breath sounds  · no rales  Cardiovascular:  · no jugular venous distention  · Irregular iregular rhythm, tachycardia  · apical impulse normal  · S1 normal, S2 normal  · no S3, no S4   · no murmur  · no rub, no thrill  · carotid pulses normal; no bruit  · pedal pulses normal  · lower extremity edema: none    Gastrointestinal:   · bowel sounds normal  · non-tender  · no hepatomegaly, no splenomegaly  Musculoskeletal:  · no clubbing of fingers.   · normocephalic, head atraumatic  Skin:   · warm, dry  Psychiatric:  · judgement and insight " appropriate  · normal mood and affect    RESULTS:     ECG 12 Lead    Date/Time: 7/11/2022 10:41 AM  Performed by: Shaun Parker MD  Authorized by: Shaun Parker MD   Comparison: compared with previous ECG from 12/13/2021  Comparison to previous ECG: A. fib with RVR today  Rhythm: atrial fibrillation  Rate: tachycardic  BPM: 120  QRS axis: normal    Clinical impression: abnormal EKG            Results for orders placed during the hospital encounter of 04/19/21    Adult Transthoracic Echo Complete W/ Cont if Necessary Per Protocol    Interpretation Summary  · Left ventricular ejection fraction appears to be 61 - 65%. Left ventricular systolic function is normal.  · Estimated right ventricular systolic pressure from tricuspid regurgitation is normal (<35 mmHg). Calculated right ventricular systolic pressure from tricuspid regurgitation is 26 mmHg.  · Left ventricular diastolic function was normal.  · No significant structural or functional valvular disease.        Labs:  Lab Results   Component Value Date    TRIG 132 11/23/2021    HDL 38 (L) 11/23/2021    .6 (H) 11/23/2021    AST 16 (L) 11/23/2021    ALT 22 11/23/2021     No results found for: HGBA1C  No components found for: CREATINININE  eGFR Non  Am   Date Value Ref Range Status   11/23/2021 >60 >60 mL/min/1.73m*2 Final     Comment:     eGFR = estimated GFR; eGFR units = mL/min/1.73 sq meters Chronic Kidney Disease is considered if eGFR <60 mL/min/1.73 sq meters Kidney failure is considered if eGFR is <15 mL/min/1.73 sq meters. eGFR assumes steady state plasma creatinine concentration; not applicable if renal function is rapidly changing or patient is on dialysis.     eGFR Non  Amer   Date Value Ref Range Status   05/06/2020 77 >60 mL/min/1.73 Final         ASSESSMENT:  Problem List Items Addressed This Visit        Cardiac and Vasculature    A-fib (HCC) - Primary    Overview     5/6/20: successful ECV    2/20/20 Echo  · LVEF  40-50%  · Right ventricular moderately dilated  · Right atrium moderately dilated  · Normal appearing atrial PFO closure device  · No significant valvular abnormalities    1/17/17 Echo  · LVEF >55%  · Normal PFO closure device  · No significant valvular abnormalities            Essential hypertension    Hyperlipidemia LDL goal <100    Congenital septal defect of heart    Overview     Hx of ASD closure, in [place], approximately 2003           History of percutaneous transcatheter closure of congenital ASD    Overview     2003.           Bradycardia       Pulmonary and Pneumonias    SOB (shortness of breath)    Overview     4/19/21 Echo: LVEF 61-65%, RVSP 26 mmHg, structurally normal  4/19/21 Low risk stress test                     PLAN:    1.  Paroxysmal atrial fibrillation:  Successful ECV on 5/6/20  Back in A. fib over the last 4-5 months, A. fib with RVR today, increase flecainide to 50 mg twice daily  He had bradycardia with metoprolol in the past therefore we will avoid beta-blockers    Schedule external cardioversion, continue Eliquis uninterrupted    2.  Hypertension:  Bring blood pressure log to next visit  Long-term goal blood pressure is 130/80   Low blood pressure today, discontinue lisinopril    3.  Hyperlipidemia:  Well-controlled, continue simvastatin 40 mg daily    4.  Decreased exercise tolerance:  Echocardiogram 4/19/21 LVEF 61-65%, no valvular disease  Stress test 4/19/21 no evidence of ischemia     5.  Bradycardia:  Avoid all AV sarah blocking agents  Currently asymptomatic  He has an apple watch, average heart rate at home 75-84     6.  Leg weakness:  Stop Zocor for 3 weeks, if it does not help leg weakness, I would refer to neurology    Return to clinic in 3 months    Thank you for the opportunity to share in the care of your patient; please do not hesitate to call me with any questions.     Shaun Parker MD, [facility]  Office: [phone] [address] [facility]  [address] [place] [address]  [address]

## 2022-07-08 NOTE — H&P (VIEW-ONLY)
OFFICE VISIT  NOTE  [facility] CARDIOLOGY      Name: Shaun Tyler    Date: 2022  MRN:  [id number]  :  1945      REFERRING/PRIMARY PROVIDER:  Alejandro Richard PA-C    Chief Complaint   Patient presents with   • Paroxysmal atrial fibrillation (CMS/HCC)       HPI: Shaun Tyler is a 77 y.o. male who presents today for follow-up for a-fib.  Associated history of percutaneous ASD closure in , hypertension, hyperlipidemia.  Work-up at  2020 includes echocardiogram showing EF of 40 to 50%, mild LA enlargement, moderate RA enlargement, normal pulmonary pressures, ASD closure device in place.   Started flecainide 3/23/2020, underwent successful external cardioversion 2020.  Low risk stress test and echo 2021  Back in A. fib over the last 4-5 months, had some dizziness, flecainide was decreased to help with dizziness but did not, he has been weak, legs still weak.  He did not stop simvastatin after last visit.    Past Medical History:   Diagnosis Date   • A-fib (HCC)    • Atrial septal defect    • Chicken pox    • Hiatal hernia    • Measles    • Mumps    • S/P patch closure of atrial septal defect        Past Surgical History:   Procedure Laterality Date   • ATRIAL SEPTAL DEFECT REPAIR         Social History     Socioeconomic History   • Marital status:    Tobacco Use   • Smoking status: Never Smoker   • Smokeless tobacco: Never Used   Vaping Use   • Vaping Use: Never used   Substance and Sexual Activity   • Alcohol use: Not Currently   • Drug use: Never   • Sexual activity: Defer       Family History   Problem Relation Age of Onset   • Cancer Mother    • Pneumonia Sister         ROS:   Constitutional no fever,  no weight loss   Skin no rash, no subcutaneous nodules   Otolaryngeal no difficulty swallowing   Cardiovascular See HPI   Pulmonary no cough, no sputum production   Gastrointestinal no constipation, no diarrhea   Genitourinary no dysuria, no  "hematuria   Hematologic no easy bruisability, no abnormal bleeding   Musculoskeletal no muscle pain   Neurologic no dizziness, no falls         Allergies   Allergen Reactions   • Penicillins Itching         Current Outpatient Medications:   •  Eliquis 5 MG tablet tablet, TAKE ONE TABLET BY MOUTH TWICE A DAY, Disp: 180 tablet, Rfl: 3  •  ferrous sulfate 325 (65 FE) MG tablet, Take 325 mg by mouth 1 (One) Time Per Week., Disp: , Rfl:   •  flecainide (TAMBOCOR) 50 MG tablet, TAKE ONE TABLET BY MOUTH TWICE A DAY (Patient taking differently: 25 mg.), Disp: 180 tablet, Rfl: 3  •  fluticasone (FLONASE) 50 MCG/ACT nasal spray, 2 sprays into the nostril(s) as directed by provider Daily., Disp: , Rfl:   •  lansoprazole (PREVACID) 30 MG capsule, Take 30 mg by mouth Daily., Disp: , Rfl:   •  psyllium (METAMUCIL) 58.6 % packet, Take 1 packet by mouth Daily., Disp: , Rfl:   •  simvastatin (ZOCOR) 40 MG tablet, Take 40 mg by mouth Every Night., Disp: , Rfl:     Vitals:    07/11/22 1020   BP: 120/78   BP Location: Right arm   Patient Position: Sitting   Pulse: (!) 121   SpO2: 96%   Weight: 107 kg (236 lb 6.4 oz)   Height: 195.6 cm (77\")     Body mass index is 28.03 kg/m².    PHYSICAL EXAM:    General Appearance:   · well developed  · well nourished  HENT:   · oropharynx moist  · lips not cyanotic  Neck:  · thyroid not enlarged  · supple  Respiratory:  · no respiratory distress  · normal breath sounds  · no rales  Cardiovascular:  · no jugular venous distention  · Irregular iregular rhythm, tachycardia  · apical impulse normal  · S1 normal, S2 normal  · no S3, no S4   · no murmur  · no rub, no thrill  · carotid pulses normal; no bruit  · pedal pulses normal  · lower extremity edema: none    Gastrointestinal:   · bowel sounds normal  · non-tender  · no hepatomegaly, no splenomegaly  Musculoskeletal:  · no clubbing of fingers.   · normocephalic, head atraumatic  Skin:   · warm, dry  Psychiatric:  · judgement and insight " appropriate  · normal mood and affect    RESULTS:     ECG 12 Lead    Date/Time: 7/11/2022 10:41 AM  Performed by: Shaun Parker MD  Authorized by: Shaun Parker MD   Comparison: compared with previous ECG from 12/13/2021  Comparison to previous ECG: A. fib with RVR today  Rhythm: atrial fibrillation  Rate: tachycardic  BPM: 120  QRS axis: normal    Clinical impression: abnormal EKG            Results for orders placed during the hospital encounter of 04/19/21    Adult Transthoracic Echo Complete W/ Cont if Necessary Per Protocol    Interpretation Summary  · Left ventricular ejection fraction appears to be 61 - 65%. Left ventricular systolic function is normal.  · Estimated right ventricular systolic pressure from tricuspid regurgitation is normal (<35 mmHg). Calculated right ventricular systolic pressure from tricuspid regurgitation is 26 mmHg.  · Left ventricular diastolic function was normal.  · No significant structural or functional valvular disease.        Labs:  Lab Results   Component Value Date    TRIG 132 11/23/2021    HDL 38 (L) 11/23/2021    .6 (H) 11/23/2021    AST 16 (L) 11/23/2021    ALT 22 11/23/2021     No results found for: HGBA1C  No components found for: CREATINININE  eGFR Non  Am   Date Value Ref Range Status   11/23/2021 >60 >60 mL/min/1.73m*2 Final     Comment:     eGFR = estimated GFR; eGFR units = mL/min/1.73 sq meters Chronic Kidney Disease is considered if eGFR <60 mL/min/1.73 sq meters Kidney failure is considered if eGFR is <15 mL/min/1.73 sq meters. eGFR assumes steady state plasma creatinine concentration; not applicable if renal function is rapidly changing or patient is on dialysis.     eGFR Non  Amer   Date Value Ref Range Status   05/06/2020 75 >60 mL/min/1.73 Final         ASSESSMENT:  Problem List Items Addressed This Visit        Cardiac and Vasculature    A-fib (HCC) - Primary    Overview     5/6/20: successful ECV    2/20/20 Echo  · LVEF  40-50%  · Right ventricular moderately dilated  · Right atrium moderately dilated  · Normal appearing atrial PFO closure device  · No significant valvular abnormalities    1/17/17 Echo  · LVEF >55%  · Normal PFO closure device  · No significant valvular abnormalities            Essential hypertension    Hyperlipidemia LDL goal <100    Congenital septal defect of heart    Overview     Hx of ASD closure, in [place], approximately 2003           History of percutaneous transcatheter closure of congenital ASD    Overview     2003.           Bradycardia       Pulmonary and Pneumonias    SOB (shortness of breath)    Overview     4/19/21 Echo: LVEF 61-65%, RVSP 26 mmHg, structurally normal  4/19/21 Low risk stress test                     PLAN:    1.  Paroxysmal atrial fibrillation:  Successful ECV on 5/6/20  Back in A. fib over the last 4-5 months, A. fib with RVR today, increase flecainide to 50 mg twice daily  He had bradycardia with metoprolol in the past therefore we will avoid beta-blockers    Schedule external cardioversion, continue Eliquis uninterrupted    2.  Hypertension:  Bring blood pressure log to next visit  Long-term goal blood pressure is 130/80   Low blood pressure today, discontinue lisinopril    3.  Hyperlipidemia:  Well-controlled, continue simvastatin 40 mg daily    4.  Decreased exercise tolerance:  Echocardiogram 4/19/21 LVEF 61-65%, no valvular disease  Stress test 4/19/21 no evidence of ischemia     5.  Bradycardia:  Avoid all AV sarah blocking agents  Currently asymptomatic  He has an apple watch, average heart rate at home 75-84     6.  Leg weakness:  Stop Zocor for 3 weeks, if it does not help leg weakness, I would refer to neurology    Return to clinic in 3 months    Thank you for the opportunity to share in the care of your patient; please do not hesitate to call me with any questions.     Shaun Parker MD, [facility]  Office: [phone] [address] [facility]  [address] [place] [address]  [phone]

## 2022-07-11 ENCOUNTER — OFFICE VISIT (OUTPATIENT)
Dept: CARDIOLOGY | Facility: CLINIC | Age: 77
End: 2022-07-11

## 2022-07-11 VITALS
WEIGHT: 236.4 LBS | OXYGEN SATURATION: 96 % | BODY MASS INDEX: 27.91 KG/M2 | SYSTOLIC BLOOD PRESSURE: 120 MMHG | DIASTOLIC BLOOD PRESSURE: 78 MMHG | HEART RATE: 121 BPM | HEIGHT: 77 IN

## 2022-07-11 DIAGNOSIS — Z87.74 HISTORY OF PERCUTANEOUS TRANSCATHETER CLOSURE OF CONGENITAL ASD: ICD-10-CM

## 2022-07-11 DIAGNOSIS — I10 ESSENTIAL HYPERTENSION: ICD-10-CM

## 2022-07-11 DIAGNOSIS — R00.1 BRADYCARDIA: ICD-10-CM

## 2022-07-11 DIAGNOSIS — Q21.9 CONGENITAL SEPTAL DEFECT OF HEART: ICD-10-CM

## 2022-07-11 DIAGNOSIS — I48.0 PAROXYSMAL ATRIAL FIBRILLATION: Primary | ICD-10-CM

## 2022-07-11 DIAGNOSIS — E78.5 HYPERLIPIDEMIA LDL GOAL <100: ICD-10-CM

## 2022-07-11 DIAGNOSIS — R06.02 SOB (SHORTNESS OF BREATH): ICD-10-CM

## 2022-07-11 PROCEDURE — 93000 ELECTROCARDIOGRAM COMPLETE: CPT | Performed by: INTERNAL MEDICINE

## 2022-07-11 PROCEDURE — 99214 OFFICE O/P EST MOD 30 MIN: CPT | Performed by: INTERNAL MEDICINE

## 2022-07-11 RX ORDER — FLECAINIDE ACETATE 50 MG/1
50 TABLET ORAL 2 TIMES DAILY
Qty: 180 TABLET | Refills: 3 | Status: ON HOLD | OUTPATIENT
Start: 2022-07-11 | End: 2022-11-04 | Stop reason: SDUPTHER

## 2022-07-15 ENCOUNTER — PREP FOR SURGERY (OUTPATIENT)
Dept: OTHER | Facility: HOSPITAL | Age: 77
End: 2022-07-15

## 2022-07-15 RX ORDER — SODIUM CHLORIDE 0.9 % (FLUSH) 0.9 %
10 SYRINGE (ML) INJECTION AS NEEDED
Status: CANCELLED | OUTPATIENT
Start: 2022-07-15

## 2022-07-15 RX ORDER — SODIUM CHLORIDE 0.9 % (FLUSH) 0.9 %
10 SYRINGE (ML) INJECTION EVERY 12 HOURS SCHEDULED
Status: CANCELLED | OUTPATIENT
Start: 2022-07-15

## 2022-07-15 RX ORDER — ACETAMINOPHEN 325 MG/1
650 TABLET ORAL EVERY 4 HOURS PRN
Status: CANCELLED | OUTPATIENT
Start: 2022-07-15

## 2022-07-19 ENCOUNTER — HOSPITAL ENCOUNTER (OUTPATIENT)
Dept: CARDIOLOGY | Facility: HOSPITAL | Age: 77
Discharge: HOME | End: 2022-07-19
Attending: INTERNAL MEDICINE | Admitting: INTERNAL MEDICINE

## 2022-07-19 ENCOUNTER — APPOINTMENT (OUTPATIENT)
Dept: CARDIOLOGY | Facility: HOSPITAL | Age: 77
End: 2022-07-19

## 2022-07-19 VITALS
SYSTOLIC BLOOD PRESSURE: 118 MMHG | HEIGHT: 77 IN | DIASTOLIC BLOOD PRESSURE: 85 MMHG | HEART RATE: 50 BPM | OXYGEN SATURATION: 98 % | BODY MASS INDEX: 27.31 KG/M2 | WEIGHT: 231.26 LBS | RESPIRATION RATE: 16 BRPM

## 2022-07-19 DIAGNOSIS — R06.02 SOB (SHORTNESS OF BREATH): ICD-10-CM

## 2022-07-19 DIAGNOSIS — R06.83 SNORING: Primary | ICD-10-CM

## 2022-07-19 DIAGNOSIS — I48.0 PAROXYSMAL ATRIAL FIBRILLATION: ICD-10-CM

## 2022-07-19 LAB
ALBUMIN SERPL-MCNC: 4 G/DL (ref 3.5–5.2)
ALBUMIN/GLOB SERPL: 1.9 G/DL
ALP SERPL-CCNC: 93 U/L (ref 39–117)
ALT SERPL W P-5'-P-CCNC: 20 U/L (ref 1–41)
ANION GAP SERPL CALCULATED.3IONS-SCNC: 9 MMOL/L (ref 5–15)
AST SERPL-CCNC: 17 U/L (ref 1–40)
BILIRUB SERPL-MCNC: 0.5 MG/DL (ref 0–1.2)
BUN SERPL-MCNC: 17 MG/DL (ref 8–23)
BUN/CREAT SERPL: 18.3 (ref 7–25)
CALCIUM SPEC-SCNC: 8.8 MG/DL (ref 8.6–10.5)
CHLORIDE SERPL-SCNC: 104 MMOL/L (ref 98–107)
CHOLEST SERPL-MCNC: 264 MG/DL (ref 0–200)
CO2 SERPL-SCNC: 25 MMOL/L (ref 22–29)
CREAT SERPL-MCNC: 0.93 MG/DL (ref 0.76–1.27)
DEPRECATED RDW RBC AUTO: 46.5 FL (ref 37–54)
EGFRCR SERPLBLD CKD-EPI 2021: 85.6 ML/MIN/1.73
ERYTHROCYTE [DISTWIDTH] IN BLOOD BY AUTOMATED COUNT: 13.6 % (ref 12.3–15.4)
GLOBULIN UR ELPH-MCNC: 2.1 GM/DL
GLUCOSE SERPL-MCNC: 96 MG/DL (ref 65–99)
HBA1C MFR BLD: 6.1 % (ref 4.8–5.6)
HCT VFR BLD AUTO: 45.5 % (ref 37.5–51)
HDLC SERPL-MCNC: 37 MG/DL (ref 40–60)
HGB BLD-MCNC: 16 G/DL (ref 13–17.7)
LDLC SERPL CALC-MCNC: 194 MG/DL (ref 0–100)
LDLC/HDLC SERPL: 5.21 {RATIO}
MAXIMAL PREDICTED HEART RATE: 145 BPM
MCH RBC QN AUTO: 32.5 PG (ref 26.6–33)
MCHC RBC AUTO-ENTMCNC: 35.2 G/DL (ref 31.5–35.7)
MCV RBC AUTO: 92.5 FL (ref 79–97)
PLATELET # BLD AUTO: 204 10*3/MM3 (ref 140–450)
PMV BLD AUTO: 9.5 FL (ref 6–12)
POTASSIUM SERPL-SCNC: 4.8 MMOL/L (ref 3.5–5.2)
PROT SERPL-MCNC: 6.1 G/DL (ref 6–8.5)
QT INTERVAL: 408 MS
QTC INTERVAL: 479 MS
RBC # BLD AUTO: 4.92 10*6/MM3 (ref 4.14–5.8)
SODIUM SERPL-SCNC: 138 MMOL/L (ref 136–145)
STRESS TARGET HR: 123 BPM
TRIGL SERPL-MCNC: 172 MG/DL (ref 0–150)
VLDLC SERPL-MCNC: 33 MG/DL (ref 5–40)
WBC NRBC COR # BLD: 6.58 10*3/MM3 (ref 3.4–10.8)

## 2022-07-19 PROCEDURE — 25010000002 MIDAZOLAM PER 1 MG: Performed by: INTERNAL MEDICINE

## 2022-07-19 PROCEDURE — 92960 CARDIOVERSION ELECTRIC EXT: CPT

## 2022-07-19 PROCEDURE — 85027 COMPLETE CBC AUTOMATED: CPT | Performed by: PHYSICIAN ASSISTANT

## 2022-07-19 PROCEDURE — 36415 COLL VENOUS BLD VENIPUNCTURE: CPT

## 2022-07-19 PROCEDURE — 83036 HEMOGLOBIN GLYCOSYLATED A1C: CPT | Performed by: PHYSICIAN ASSISTANT

## 2022-07-19 PROCEDURE — 92960 CARDIOVERSION ELECTRIC EXT: CPT | Performed by: INTERNAL MEDICINE

## 2022-07-19 PROCEDURE — 80053 COMPREHEN METABOLIC PANEL: CPT | Performed by: PHYSICIAN ASSISTANT

## 2022-07-19 PROCEDURE — 93005 ELECTROCARDIOGRAM TRACING: CPT | Performed by: INTERNAL MEDICINE

## 2022-07-19 PROCEDURE — 80061 LIPID PANEL: CPT | Performed by: PHYSICIAN ASSISTANT

## 2022-07-19 RX ORDER — ACETAMINOPHEN 325 MG/1
650 TABLET ORAL EVERY 4 HOURS PRN
Status: DISCONTINUED | OUTPATIENT
Start: 2022-07-19 | End: 2022-07-19 | Stop reason: HOSPADM

## 2022-07-19 RX ORDER — SODIUM CHLORIDE 0.9 % (FLUSH) 0.9 %
10 SYRINGE (ML) INJECTION AS NEEDED
Status: DISCONTINUED | OUTPATIENT
Start: 2022-07-19 | End: 2022-07-19 | Stop reason: HOSPADM

## 2022-07-19 RX ORDER — MIDAZOLAM HYDROCHLORIDE 1 MG/ML
INJECTION INTRAMUSCULAR; INTRAVENOUS
Status: DISCONTINUED
Start: 2022-07-19 | End: 2022-07-19 | Stop reason: HOSPADM

## 2022-07-19 RX ORDER — SODIUM CHLORIDE 0.9 % (FLUSH) 0.9 %
10 SYRINGE (ML) INJECTION EVERY 12 HOURS SCHEDULED
Status: DISCONTINUED | OUTPATIENT
Start: 2022-07-19 | End: 2022-07-19 | Stop reason: HOSPADM

## 2022-07-19 RX ORDER — MIDAZOLAM HYDROCHLORIDE 1 MG/ML
INJECTION INTRAMUSCULAR; INTRAVENOUS
Status: COMPLETED | OUTPATIENT
Start: 2022-07-19 | End: 2022-07-19

## 2022-07-19 RX ADMIN — Medication 10 ML: at 11:30

## 2022-07-19 RX ADMIN — MIDAZOLAM 2 MG: 1 INJECTION INTRAMUSCULAR; INTRAVENOUS at 12:57

## 2022-07-19 NOTE — INTERVAL H&P NOTE
"  H&P reviewed. The patient was examined and there are no changes to the H&P.      Vitals:    07/19/22 1112 07/19/22 1113   BP: (!) 149/123 (!) 137/103   BP Location: Right arm Left arm   Patient Position: Lying Lying   Pulse: 99    Resp: 16    TempSrc: Tympanic    SpO2: 96%    Weight: 105 kg (231 lb 4.2 oz)    Height: 195.6 cm (77\")      Lab Results   Component Value Date    WBC 6.58 07/19/2022    HGB 16.0 07/19/2022    HCT 45.5 07/19/2022    MCV 92.5 07/19/2022     07/19/2022      Lab Results   Component Value Date    CHOL 264 (H) 07/19/2022    CHLPL 196 11/23/2021    TRIG 172 (H) 07/19/2022    HDL 37 (L) 07/19/2022     (H) 07/19/2022      Lab Results   Component Value Date    GLUCOSE 96 07/19/2022    BUN 17 07/19/2022    CREATININE 0.93 07/19/2022    EGFRIFNONA >60 11/23/2021    EGFRIFAFRI >60 11/23/2021    BCR 18.3 07/19/2022    K 4.8 07/19/2022    CO2 25.0 07/19/2022    CALCIUM 8.8 07/19/2022    ALBUMIN 4.00 07/19/2022    AST 17 07/19/2022    ALT 20 07/19/2022    No results found for: HGBA1C     Shaun Parker M.D., F.A.C.C.  Interventional Cardiology  07/19/22  12:40 EDT      "

## 2022-07-19 NOTE — DISCHARGE INSTR - ACTIVITY
You are to report to Dr. Parker's office by July 29, 2022 to obtain a walk-in EKG.  No appointment needed.

## 2022-07-26 ENCOUNTER — CLINICAL SUPPORT (OUTPATIENT)
Dept: CARDIOLOGY | Facility: CLINIC | Age: 77
End: 2022-07-26

## 2022-07-26 ENCOUNTER — TELEPHONE (OUTPATIENT)
Dept: CARDIOLOGY | Facility: CLINIC | Age: 77
End: 2022-07-26

## 2022-07-26 DIAGNOSIS — I48.0 PAROXYSMAL ATRIAL FIBRILLATION: Primary | ICD-10-CM

## 2022-07-26 PROCEDURE — 93000 ELECTROCARDIOGRAM COMPLETE: CPT | Performed by: INTERNAL MEDICINE

## 2022-07-26 NOTE — TELEPHONE ENCOUNTER
Pt came in for walk-in EKG after ECV 7/19/22, sinus marian rate 36 pt states he is feeling better since ECV. He is not on any BB- bradycardiac in the past. Walk-in EKG and most recent EKG from 7/19 forwarded to RDS for review.

## 2022-07-27 NOTE — TELEPHONE ENCOUNTER
I reviewed his EKG has sinus bradycardia but if he is feeling okay or better we will continue current medical therapy for now.

## 2022-08-08 RX ORDER — APIXABAN 5 MG/1
TABLET, FILM COATED ORAL
Qty: 180 TABLET | Refills: 3 | Status: SHIPPED | OUTPATIENT
Start: 2022-08-08

## 2022-10-27 ENCOUNTER — HOSPITAL ENCOUNTER (OUTPATIENT)
Dept: CARDIOLOGY | Facility: HOSPITAL | Age: 77
Discharge: HOME | End: 2022-10-27

## 2022-10-27 ENCOUNTER — CLINICAL SUPPORT (OUTPATIENT)
Dept: CARDIOLOGY | Facility: CLINIC | Age: 77
End: 2022-10-27

## 2022-10-27 ENCOUNTER — TELEPHONE (OUTPATIENT)
Dept: CARDIOLOGY | Facility: CLINIC | Age: 77
End: 2022-10-27

## 2022-10-27 DIAGNOSIS — R06.02 SOB (SHORTNESS OF BREATH): ICD-10-CM

## 2022-10-27 DIAGNOSIS — I48.91 ATRIAL FIBRILLATION, UNSPECIFIED TYPE: Primary | ICD-10-CM

## 2022-10-27 PROCEDURE — 93000 ELECTROCARDIOGRAM COMPLETE: CPT | Performed by: INTERNAL MEDICINE

## 2022-10-27 NOTE — TELEPHONE ENCOUNTER
Pt came in for EKG today. His BP cuff kept saying he was in afib. BP running around 105/70-80. He is symptomatic with MARTIN. RDS reviewed EKG. Per RDS order ecv for next week and increase Flecainide to 100 mg BID. Pt taken to [facility] to schedule ecv and pt says he has enough of the 50 mg to double right now. We will send in new RX if he remains on 100 mg.

## 2022-11-02 ENCOUNTER — OFFICE VISIT (OUTPATIENT)
Dept: SLEEP MEDICINE | Facility: HOSPITAL | Age: 77
End: 2022-11-02

## 2022-11-02 VITALS
BODY MASS INDEX: 28.31 KG/M2 | HEART RATE: 85 BPM | HEIGHT: 77 IN | SYSTOLIC BLOOD PRESSURE: 110 MMHG | OXYGEN SATURATION: 96 % | WEIGHT: 239.8 LBS | DIASTOLIC BLOOD PRESSURE: 83 MMHG

## 2022-11-02 DIAGNOSIS — R53.83 FATIGUE DUE TO SLEEP PATTERN DISTURBANCE: Primary | ICD-10-CM

## 2022-11-02 DIAGNOSIS — G47.9 FATIGUE DUE TO SLEEP PATTERN DISTURBANCE: Primary | ICD-10-CM

## 2022-11-02 DIAGNOSIS — G47.33 OSA (OBSTRUCTIVE SLEEP APNEA): ICD-10-CM

## 2022-11-02 DIAGNOSIS — R06.83 SNORING: ICD-10-CM

## 2022-11-02 PROCEDURE — 99204 OFFICE O/P NEW MOD 45 MIN: CPT | Performed by: INTERNAL MEDICINE

## 2022-11-02 NOTE — PROGRESS NOTES
Shaun Tyler is a 77 y.o. male.   Chief Complaint   Patient presents with   • Sleeping Problem       HPI     77 y.o. male seen in consultation at the request of Norma Ocampo for evaluation of the above.     He was referred here by cardiology.  He has a recent diagnosis of atrial fibrillation.  He underwent ECV in July but since then he has returned to atrial fibrillation.  Plans are for another ECV in the near future.  He was felt to have a significant possibility of NATHAN and was referred here for further evaluation.    He has been told he snores by his wife.  She has witnessed apneas.    He keeps a regular sleep schedule from around 10 PM to 6 or 7 AM.  He thinks he averages 5 to 7 hours of sleep per night.  He fall asleep fairly quickly and awakens 1-3 times throughout the night.  He will occasionally nap during the day.    He denies any RLS type symptoms.  He has no nocturnal hallucinations or sleep paralysis.  He is not bothered by nocturnal pain.    Havana Scale is: 9/24    The patient's relevant past medical, surgical, family, and social history reviewed and updated in Epic as appropriate.    Current medications are:   Current Outpatient Medications:   •  Eliquis 5 MG tablet tablet, TAKE ONE TABLET BY MOUTH TWICE A DAY, Disp: 180 tablet, Rfl: 3  •  ferrous sulfate 325 (65 FE) MG tablet, Take 325 mg by mouth 1 (One) Time Per Week., Disp: , Rfl:   •  flecainide (TAMBOCOR) 50 MG tablet, Take 1 tablet by mouth 2 (Two) Times a Day., Disp: 180 tablet, Rfl: 3  •  fluticasone (FLONASE) 50 MCG/ACT nasal spray, 2 sprays into the nostril(s) as directed by provider Daily., Disp: , Rfl:   •  lansoprazole (PREVACID) 30 MG capsule, Take 30 mg by mouth Daily., Disp: , Rfl:   •  psyllium (METAMUCIL) 58.6 % packet, Take 1 packet by mouth Daily., Disp: , Rfl: .    Review of Systems    Review of Systems  ROS documented in patient questionnaire ×14 systems.  Reviewed with patient.  Otherwise negative except as  "noted in HPI.    Physical Exam    Blood pressure 110/83, pulse 85, height 195.6 cm (77\"), weight 109 kg (239 lb 12.8 oz), SpO2 96 %. Body mass index is 28.44 kg/m².    Physical Exam  Vitals and nursing note reviewed.   Constitutional:       Appearance: Normal appearance. He is well-developed.   HENT:      Head: Normocephalic and atraumatic.      Nose: Nose normal.      Mouth/Throat:      Mouth: Mucous membranes are moist.      Pharynx: Oropharynx is clear. No oropharyngeal exudate.      Comments: Class III airway  Eyes:      General: No scleral icterus.     Conjunctiva/sclera: Conjunctivae normal.   Neck:      Thyroid: No thyromegaly.      Trachea: No tracheal deviation.   Cardiovascular:      Rate and Rhythm: Normal rate and regular rhythm.      Heart sounds: No murmur heard.    No friction rub. No gallop.   Pulmonary:      Effort: Pulmonary effort is normal. No respiratory distress.      Breath sounds: No wheezing or rales.   Musculoskeletal:         General: No deformity. Normal range of motion.   Skin:     General: Skin is warm and dry.      Findings: No rash.   Neurological:      Mental Status: He is alert and oriented to person, place, and time.   Psychiatric:         Behavior: Behavior normal.         Thought Content: Thought content normal.         DATA:    Reviewed 7/11/2022 note from Dr. Parker    Reviewed 7/19/2022 labs including bicarbonate 25 and hemoglobin 16    ASSESSMENT:    Problem List Items Addressed This Visit        Pulmonary Problems    NATHAN (obstructive sleep apnea) - possible    Relevant Orders    Home Sleep Study    Snoring    Relevant Orders    Home Sleep Study       Other    Fatigue due to sleep pattern disturbance - Primary       77-year-old male with a high likelihood of obstructive sleep apnea.  He has snoring and witnessed apneas according to his wife.  He has had refractory atrial fibrillation.  He does have an at risk airway which is class III for the presence of NATHAN.  His BMI is not " significantly elevated, only mildly so.    I recommended further evaluation for the presence of obstructive sleep apnea and treatment if needed.  I went over the possible association between untreated obstructive sleep apnea and recurrent atrial fibrillation.    PLAN:    1. Home sleep apnea testing is an appropriate initial diagnostic step in his case.  2. I discussed the diagnostic process as well as treatment options for obstructive sleep apnea if that is diagnosed.  3. I went over the long-term cardiovascular and metabolic risks of untreated obstructive sleep apnea.  4. I reviewed the possible association of untreated NATHAN and atrial fibrillation.  5. The patient was amenable to a trial of CPAP therapy if deemed appropriate after testing complete.  6. Close sleep center follow-up.      I have reviewed the results of my evaluation and impression and discussed my recommendations in detail with the patient.    Level of Risk Moderate due to: undiagnosed new problem    Moderate risk of morbidity due to the possibility of untreated sleep apnea.    Signed by  Kaushal Wiggins MD    November 2, 2022      CC: Alejandro Richard PA-C McGrannahan, Cristina E*

## 2022-11-03 ENCOUNTER — PREP FOR SURGERY (OUTPATIENT)
Dept: OTHER | Facility: HOSPITAL | Age: 77
End: 2022-11-03

## 2022-11-03 RX ORDER — SODIUM CHLORIDE 0.9 % (FLUSH) 0.9 %
10 SYRINGE (ML) INJECTION EVERY 12 HOURS SCHEDULED
Status: CANCELLED | OUTPATIENT
Start: 2022-11-03

## 2022-11-03 RX ORDER — ACETAMINOPHEN 325 MG/1
650 TABLET ORAL EVERY 4 HOURS PRN
Status: CANCELLED | OUTPATIENT
Start: 2022-11-03

## 2022-11-03 RX ORDER — SODIUM CHLORIDE 0.9 % (FLUSH) 0.9 %
10 SYRINGE (ML) INJECTION AS NEEDED
Status: CANCELLED | OUTPATIENT
Start: 2022-11-03

## 2022-11-04 ENCOUNTER — HOSPITAL ENCOUNTER (OUTPATIENT)
Dept: CARDIOLOGY | Facility: HOSPITAL | Age: 77
Discharge: HOME | End: 2022-11-04
Attending: INTERNAL MEDICINE | Admitting: INTERNAL MEDICINE

## 2022-11-04 VITALS
BODY MASS INDEX: 27.72 KG/M2 | HEIGHT: 77 IN | OXYGEN SATURATION: 96 % | WEIGHT: 234.8 LBS | TEMPERATURE: 97.3 F | HEART RATE: 64 BPM | DIASTOLIC BLOOD PRESSURE: 88 MMHG | RESPIRATION RATE: 18 BRPM | SYSTOLIC BLOOD PRESSURE: 121 MMHG

## 2022-11-04 DIAGNOSIS — I48.91 ATRIAL FIBRILLATION, UNSPECIFIED TYPE: ICD-10-CM

## 2022-11-04 DIAGNOSIS — R06.02 SOB (SHORTNESS OF BREATH): ICD-10-CM

## 2022-11-04 LAB
ALBUMIN SERPL-MCNC: 4.1 G/DL (ref 3.5–5.2)
ALBUMIN/GLOB SERPL: 1.5 G/DL
ALP SERPL-CCNC: 102 U/L (ref 39–117)
ALT SERPL W P-5'-P-CCNC: 22 U/L (ref 1–41)
ANION GAP SERPL CALCULATED.3IONS-SCNC: 11 MMOL/L (ref 5–15)
AST SERPL-CCNC: 19 U/L (ref 1–40)
BILIRUB SERPL-MCNC: 0.6 MG/DL (ref 0–1.2)
BUN SERPL-MCNC: 16 MG/DL (ref 8–23)
BUN/CREAT SERPL: 13.2 (ref 7–25)
CALCIUM SPEC-SCNC: 8.9 MG/DL (ref 8.6–10.5)
CHLORIDE SERPL-SCNC: 102 MMOL/L (ref 98–107)
CHOLEST SERPL-MCNC: 302 MG/DL (ref 0–200)
CO2 SERPL-SCNC: 23 MMOL/L (ref 22–29)
CREAT SERPL-MCNC: 1.21 MG/DL (ref 0.76–1.27)
DEPRECATED RDW RBC AUTO: 49.4 FL (ref 37–54)
EGFRCR SERPLBLD CKD-EPI 2021: 62.4 ML/MIN/1.73
ERYTHROCYTE [DISTWIDTH] IN BLOOD BY AUTOMATED COUNT: 13.7 % (ref 12.3–15.4)
GLOBULIN UR ELPH-MCNC: 2.7 GM/DL
GLUCOSE SERPL-MCNC: 100 MG/DL (ref 65–99)
HCT VFR BLD AUTO: 50.3 % (ref 37.5–51)
HDLC SERPL-MCNC: 35 MG/DL (ref 40–60)
HGB BLD-MCNC: 16.8 G/DL (ref 13–17.7)
LDLC SERPL CALC-MCNC: 230 MG/DL (ref 0–100)
LDLC/HDLC SERPL: 6.55 {RATIO}
MAXIMAL PREDICTED HEART RATE: 145 BPM
MCH RBC QN AUTO: 32.2 PG (ref 26.6–33)
MCHC RBC AUTO-ENTMCNC: 33.4 G/DL (ref 31.5–35.7)
MCV RBC AUTO: 96.5 FL (ref 79–97)
PLATELET # BLD AUTO: 224 10*3/MM3 (ref 140–450)
PMV BLD AUTO: 9.4 FL (ref 6–12)
POTASSIUM SERPL-SCNC: 4.6 MMOL/L (ref 3.5–5.2)
PROT SERPL-MCNC: 6.8 G/DL (ref 6–8.5)
QT INTERVAL: 450 MS
QTC INTERVAL: 495 MS
RBC # BLD AUTO: 5.21 10*6/MM3 (ref 4.14–5.8)
SODIUM SERPL-SCNC: 136 MMOL/L (ref 136–145)
STRESS TARGET HR: 123 BPM
TRIGL SERPL-MCNC: 189 MG/DL (ref 0–150)
VLDLC SERPL-MCNC: 37 MG/DL (ref 5–40)
WBC NRBC COR # BLD: 6.7 10*3/MM3 (ref 3.4–10.8)

## 2022-11-04 PROCEDURE — 25010000002 MIDAZOLAM PER 1 MG: Performed by: INTERNAL MEDICINE

## 2022-11-04 PROCEDURE — 36415 COLL VENOUS BLD VENIPUNCTURE: CPT

## 2022-11-04 PROCEDURE — 92960 CARDIOVERSION ELECTRIC EXT: CPT | Performed by: INTERNAL MEDICINE

## 2022-11-04 PROCEDURE — 80061 LIPID PANEL: CPT | Performed by: PHYSICIAN ASSISTANT

## 2022-11-04 PROCEDURE — 85027 COMPLETE CBC AUTOMATED: CPT | Performed by: PHYSICIAN ASSISTANT

## 2022-11-04 PROCEDURE — 93005 ELECTROCARDIOGRAM TRACING: CPT | Performed by: INTERNAL MEDICINE

## 2022-11-04 PROCEDURE — 92960 CARDIOVERSION ELECTRIC EXT: CPT

## 2022-11-04 PROCEDURE — 80053 COMPREHEN METABOLIC PANEL: CPT | Performed by: PHYSICIAN ASSISTANT

## 2022-11-04 RX ORDER — NALOXONE HCL 0.4 MG/ML
0.4 VIAL (ML) INJECTION ONCE AS NEEDED
Status: DISCONTINUED | OUTPATIENT
Start: 2022-11-04 | End: 2022-11-04 | Stop reason: HOSPADM

## 2022-11-04 RX ORDER — FLECAINIDE ACETATE 50 MG/1
100 TABLET ORAL 2 TIMES DAILY
Qty: 90 TABLET | Refills: 2 | Status: SHIPPED | OUTPATIENT
Start: 2022-11-04 | End: 2022-11-14

## 2022-11-04 RX ORDER — SODIUM CHLORIDE 0.9 % (FLUSH) 0.9 %
10 SYRINGE (ML) INJECTION AS NEEDED
Status: DISCONTINUED | OUTPATIENT
Start: 2022-11-04 | End: 2022-11-04 | Stop reason: HOSPADM

## 2022-11-04 RX ORDER — MIDAZOLAM HYDROCHLORIDE 1 MG/ML
INJECTION INTRAMUSCULAR; INTRAVENOUS
Status: COMPLETED | OUTPATIENT
Start: 2022-11-04 | End: 2022-11-04

## 2022-11-04 RX ORDER — FLUMAZENIL 0.1 MG/ML
0.5 INJECTION INTRAVENOUS ONCE AS NEEDED
Status: DISCONTINUED | OUTPATIENT
Start: 2022-11-04 | End: 2022-11-04 | Stop reason: HOSPADM

## 2022-11-04 RX ORDER — FENTANYL CITRATE 50 UG/ML
50-100 INJECTION, SOLUTION INTRAMUSCULAR; INTRAVENOUS ONCE AS NEEDED
Status: DISCONTINUED | OUTPATIENT
Start: 2022-11-04 | End: 2022-11-04 | Stop reason: HOSPADM

## 2022-11-04 RX ORDER — ACETAMINOPHEN 325 MG/1
650 TABLET ORAL EVERY 4 HOURS PRN
Status: DISCONTINUED | OUTPATIENT
Start: 2022-11-04 | End: 2022-11-04 | Stop reason: HOSPADM

## 2022-11-04 RX ORDER — MIDAZOLAM HYDROCHLORIDE 1 MG/ML
2-8 INJECTION INTRAMUSCULAR; INTRAVENOUS ONCE AS NEEDED
Status: DISCONTINUED | OUTPATIENT
Start: 2022-11-04 | End: 2022-11-04 | Stop reason: HOSPADM

## 2022-11-04 RX ORDER — SODIUM CHLORIDE 0.9 % (FLUSH) 0.9 %
10 SYRINGE (ML) INJECTION EVERY 12 HOURS SCHEDULED
Status: DISCONTINUED | OUTPATIENT
Start: 2022-11-04 | End: 2022-11-04 | Stop reason: HOSPADM

## 2022-11-04 RX ADMIN — MIDAZOLAM HYDROCHLORIDE 2 MG: 1 INJECTION, SOLUTION INTRAMUSCULAR; INTRAVENOUS at 11:43

## 2022-11-04 NOTE — H&P
Pre-cardiac Procedure History and Physical  ECV  [facility]      Patient:  Shaun Tyler  :  1945  MRN: [id number]    PCP:  Alejandro Richard PA-C  PHONE:  [phone]    DATE: 2022  ID: Shaun Tyler is a 77 y.o. male          PROBLEM LIST:   Active Hospital Problems    Diagnosis  POA    A-fib (HCC) [I48.91]  Yes     Priority: High     20: successful ECV    20 Echo  LVEF 40-50%  Right ventricular moderately dilated  Right atrium moderately dilated  Normal appearing atrial PFO closure device  No significant valvular abnormalities    17 Echo  LVEF >55%  Normal PFO closure device  No significant valvular abnormalities     Echo 21:  Left ventricular ejection fraction appears to be 61 - 65%. Left ventricular systolic function is normal.  Estimated right ventricular systolic pressure from tricuspid regurgitation is normal (<35 mmHg). Calculated right ventricular systolic pressure from tricuspid regurgitation is 26 mmHg.  Left ventricular diastolic function was normal.  No significant structural or functional valvular disease.    Stress with MPS 2021:  Myocardial perfusion imaging indicates a normal myocardial perfusion study with no evidence of ischemia.  Patient denied any chest discomfort/pain during exercise; did have some noticable shortness of breath during stage 2.  Expected exercise duration 6:40, actual 7:15; ELIZABET (-10). patient requested that the treadmill be slowed/lower while the cardiolite circulated for one minute.  Left ventricular ejection fraction is hyperdynamic (Calculated EF > 70%).  Findings consistent with a normal ECG stress test.  Impressions are consistent with a low risk study.  No coronary calcium noted.    Electrical Cardioversion 22:  Successful cardioversion. Patient displayed sinus rhythm post CV.        NATHAN (obstructive sleep apnea) - possible [G47.33]  Yes    History of percutaneous  transcatheter closure of congenital ASD [Z87.74]  Not Applicable     2003.      Atrial septal defect [Q21.10]  Not Applicable     Percutaneous closure 2003.      Essential hypertension [I10]  Yes    Congenital septal defect of heart [Q21.9]  Not Applicable     Hx of ASD closure, in [place], approximately 2003      Hyperlipidemia LDL goal <100 [E78.5]  Yes         BRIEF HPI: Mr. Tyler is a 77-year-old male with the above cardiac history who presents today for electrical cardioversion.  Patient was first aware of his atrial fibrillation shortly after his ASD closure in 2003.  He has had 4 cardioversions in the past. Work-up at  in February 2020 including echo with EF 40 to 50%, mild LA enlargement, moderate RA enlargement, normal pulmonary pressures, ASD closure in place.  Patient was started on flecainide March 2020, and underwent successful external cardioversion April 2020.  Low risk stress and echo in April 2021.  Patient has been experiencing 3 to 4 months of fatigue with occasional palpitations and shortness of breath.  Patient denies chest pain or syncope.    Anticoagulation: Patient has not missed any doses of Eliquis over the past 3 weeks and his last dose was this morning    Cardiac Risk Factors: advanced age (older than 55 for men, 65 for women), dyslipidemia, hypertension, male gender and sedentary lifestyle    Prior Cardiac Investigations:     Echo 4/19/21:  Left ventricular ejection fraction appears to be 61 - 65%. Left ventricular systolic function is normal.  Estimated right ventricular systolic pressure from tricuspid regurgitation is normal (<35 mmHg). Calculated right ventricular systolic pressure from tricuspid regurgitation is 26 mmHg.  Left ventricular diastolic function was normal.  No significant structural or functional valvular disease.    Stress with MPS 4/26/2021:  Myocardial perfusion imaging indicates a normal myocardial perfusion study with no evidence of ischemia.  Patient denied any  "chest discomfort/pain during exercise; did have some noticable shortness of breath during stage 2.  Expected exercise duration 6:40, actual 7:15; ELIZABET (-10). patient requested that the treadmill be slowed/lower while the cardiolite circulated for one minute.  Left ventricular ejection fraction is hyperdynamic (Calculated EF > 70%).  Findings consistent with a normal ECG stress test.  Impressions are consistent with a low risk study.  No coronary calcium noted.    Electrical Cardioversion 7/19/22:  Successful cardioversion. Patient displayed sinus rhythm post CV.    Allergies:      Allergies   Allergen Reactions    Penicillins Itching    Simvastatin Myalgia       MEDICATIONS:  Current Outpatient Medications   Medication Instructions    Eliquis 5 MG tablet tablet TAKE ONE TABLET BY MOUTH TWICE A DAY    ferrous sulfate 325 mg, Oral, Weekly    flecainide (TAMBOCOR) 50 mg, Oral, 2 Times Daily    fluticasone (FLONASE) 50 MCG/ACT nasal spray 2 sprays, Nasal, Daily    lansoprazole (PREVACID) 30 mg, Oral, Daily    psyllium (METAMUCIL) 58.6 % packet 1 packet, Oral, Daily       Past medical & surgical history, social and family history reviewed in the electronic medical record.    ROS: Pertinent positives listed in the HPI and problem list above. All others reviewed and negative.     Physical Exam:   /91 (BP Location: Left arm, Patient Position: Lying)   Pulse 74   Temp 97.3 °F (36.3 °C) (Temporal)   Resp 18   Ht 195.6 cm (77\")   Wt 107 kg (234 lb 12.8 oz)   SpO2 97%   BMI 27.84 kg/m²     Constitutional:    Well-appearing 77 y.o. y/o adult who appears younger than stated age, in no acute distress        Heart:    Irregularly irregular rhythm and normal rate, no murmurs, rubs or gallops   Lungs:     Clear to auscultation bilaterally, no wheezes, rhales or rhonchi, nonlabored respirations   Abdomen:     Soft, nontender   Extremities:   No gross deformities, no edema, clubbing, or cyanosis.  "   Pulses:    Neuro:  Psych:   Radial  pulses palpable and equal bilaterally.    No gross focal deficits  Mood and behavior appropriate for situation       Labs and Diagnostic Data:  Results from last 7 days   Lab Units 11/04/22  1013   SODIUM mmol/L 136   POTASSIUM mmol/L 4.6   CHLORIDE mmol/L 102   CO2 mmol/L 23.0   BUN mg/dL 16   CREATININE mg/dL 1.21   GLUCOSE mg/dL 100*   CALCIUM mg/dL 8.9     Results from last 7 days   Lab Units 11/04/22  1013   WBC 10*3/mm3 6.70   HEMOGLOBIN g/dL 16.8   HEMATOCRIT % 50.3   PLATELETS 10*3/mm3 224     Lab Results   Component Value Date    CHOL 302 (H) 11/04/2022    TRIG 189 (H) 11/04/2022    HDL 35 (L) 11/04/2022     (H) 11/04/2022    AST 19 11/04/2022    ALT 22 11/04/2022                   EKG: 10/27/2022: Atrial flutter with variable AV block, incomplete RBBB  Radiology: no new data  Tele: atrial flutter, normal rate    IMPRESSION:  Mr. Tyler is a 77-year-old male with a history of paroxysmal A. fib since 2003, hypertension, hyperlipidemia, history of ASD status post closure in 2003, and NATHAN presenting today for electrical cardioversion for atrial flutter and history of 3 to 4 months of fatigue.   Anticoagulation: Eliquis, no missed doses x3 weeks.  Took morning dose today    PLAN:  Procedure to perform: ECV. Risks, benefits and alternatives to the procedure explained to the patient and he understands and wishes to proceed.     Scribed by Harrison Blue PA-C for Dr. Shaun Parker MD on 11/4/2022    IShaun M.D., personally performed the services described in this documentation as scribed by the above named individual in my presence, and it is both accurate and complete.    Shaun Parker MD, [facility]  11/04/22  12:07 EDT

## 2022-11-09 ENCOUNTER — CLINICAL SUPPORT (OUTPATIENT)
Dept: CARDIOLOGY | Facility: CLINIC | Age: 77
End: 2022-11-09

## 2022-11-09 DIAGNOSIS — I48.91 ATRIAL FIBRILLATION, UNSPECIFIED TYPE: ICD-10-CM

## 2022-11-09 DIAGNOSIS — I48.0 PAROXYSMAL ATRIAL FIBRILLATION: Primary | ICD-10-CM

## 2022-11-09 PROCEDURE — 93000 ELECTROCARDIOGRAM COMPLETE: CPT | Performed by: INTERNAL MEDICINE

## 2022-11-09 NOTE — PROGRESS NOTES
Patient presented to clinic for EKG visit.  Was endorsing symptoms of fatigue over the last 24 hours.  Shown to be back in atrial flutter with RVR after recent cardioversion.  Discussed plan of care with patient and Dr. Parker.  Add metoprolol 25 mg twice daily for rate control.  Continue current doses of flecainide and Eliquis.  We will try to obtain expedited EP referral for consideration of ablation.

## 2022-11-14 ENCOUNTER — TELEPHONE (OUTPATIENT)
Dept: CARDIOLOGY | Facility: CLINIC | Age: 77
End: 2022-11-14

## 2022-11-14 ENCOUNTER — OFFICE VISIT (OUTPATIENT)
Dept: CARDIOLOGY | Facility: CLINIC | Age: 77
End: 2022-11-14

## 2022-11-14 VITALS
OXYGEN SATURATION: 97 % | BODY MASS INDEX: 30.03 KG/M2 | DIASTOLIC BLOOD PRESSURE: 70 MMHG | WEIGHT: 234 LBS | SYSTOLIC BLOOD PRESSURE: 124 MMHG | HEART RATE: 70 BPM | HEIGHT: 74 IN

## 2022-11-14 DIAGNOSIS — Z87.74 HISTORY OF PERCUTANEOUS TRANSCATHETER CLOSURE OF CONGENITAL ASD: ICD-10-CM

## 2022-11-14 DIAGNOSIS — I10 ESSENTIAL HYPERTENSION: ICD-10-CM

## 2022-11-14 DIAGNOSIS — E78.5 HYPERLIPIDEMIA LDL GOAL <100: ICD-10-CM

## 2022-11-14 DIAGNOSIS — I48.19 PERSISTENT ATRIAL FIBRILLATION: Primary | ICD-10-CM

## 2022-11-14 DIAGNOSIS — G47.33 OSA (OBSTRUCTIVE SLEEP APNEA): ICD-10-CM

## 2022-11-14 PROCEDURE — 93000 ELECTROCARDIOGRAM COMPLETE: CPT | Performed by: INTERNAL MEDICINE

## 2022-11-14 PROCEDURE — 99214 OFFICE O/P EST MOD 30 MIN: CPT | Performed by: INTERNAL MEDICINE

## 2022-11-14 NOTE — TELEPHONE ENCOUNTER
Per RDS-    I talked to Peg, just cancel GFT appt, and schedule him for SENAIT with me next week, Peg wants to look at ASD closure to make sure Afib ablation is feasible, he will then bring pt back a week or so later for ablation. Please inform pt as well. thanks.    Spoke with pt he is amendable to plan.

## 2022-11-14 NOTE — H&P (VIEW-ONLY)
OFFICE VISIT  NOTE  [facility] [place]      Name: Shaun Tyler    Date: 2022  MRN:  [id number]  :  1945      REFERRING/PRIMARY PROVIDER:  Alejandro Richard PA-C    Chief Complaint   Patient presents with   • PAF   • Dizziness   • Shortness of Breath       HPI: Shaun Tyler is a 77 y.o. male who presents today for follow-up for a-fib.  Associated history of percutaneous ASD closure in , hypertension, hyperlipidemia.  Work-up at  2020 includes echocardiogram showing EF of 40 to 50%, mild LA enlargement, moderate RA enlargement, normal pulmonary pressures, ASD closure device in place.   Started flecainide 3/23/2020, underwent successful external cardioversion 2020.  Low risk stress test and echo 2021  Back in A. fib over the last 4-5 months, had some dizziness, flecainide was decreased to help with dizziness but did not, he has been weak, legs still weak.  Increase flecainide to 100 mg twice daily and had cardioversion 2020, unfortunately it was unsuccessful after 2 weeks he was back in atrial flutter with variable block.  He has come to our office several times due to weakness, fatigue, dyspnea    Past Medical History:   Diagnosis Date   • A-fib (HCC)    • Atrial septal defect    • Chicken pox    • Hiatal hernia    • Measles    • Mumps    • S/P patch closure of atrial septal defect        Past Surgical History:   Procedure Laterality Date   • ATRIAL SEPTAL DEFECT REPAIR     • CARDIOVERSION      2022 per Dr. Parker   • WISDOM TOOTH EXTRACTION         Social History     Socioeconomic History   • Marital status:    Tobacco Use   • Smoking status: Never   • Smokeless tobacco: Never   Vaping Use   • Vaping Use: Never used   Substance and Sexual Activity   • Alcohol use: Yes   • Drug use: Never   • Sexual activity: Defer       Family History   Problem Relation Age of Onset   • Cancer Mother    • Pneumonia Sister         ROS:  "  Constitutional no fever,  no weight loss   Skin no rash, no subcutaneous nodules   Otolaryngeal no difficulty swallowing   Cardiovascular See HPI   Pulmonary no cough, no sputum production   Gastrointestinal no constipation, no diarrhea   Genitourinary no dysuria, no hematuria   Hematologic no easy bruisability, no abnormal bleeding   Musculoskeletal no muscle pain   Neurologic no dizziness, no falls         Allergies   Allergen Reactions   • Penicillins Itching   • Simvastatin Myalgia         Current Outpatient Medications:   •  Eliquis 5 MG tablet tablet, TAKE ONE TABLET BY MOUTH TWICE A DAY, Disp: 180 tablet, Rfl: 3  •  ferrous sulfate 325 (65 FE) MG tablet, Take 325 mg by mouth 1 (One) Time Per Week., Disp: , Rfl:   •  fluticasone (FLONASE) 50 MCG/ACT nasal spray, 2 sprays into the nostril(s) as directed by provider Daily., Disp: , Rfl:   •  lansoprazole (PREVACID) 30 MG capsule, Take 30 mg by mouth Daily., Disp: , Rfl:   •  metoprolol tartrate (LOPRESSOR) 25 MG tablet, Take 1 tablet by mouth 2 (Two) Times a Day., Disp: 60 tablet, Rfl: 11  •  psyllium (METAMUCIL) 58.6 % packet, Take 1 packet by mouth Daily., Disp: , Rfl:     Vitals:    11/14/22 1445   BP: 124/70   BP Location: Right arm   Patient Position: Sitting   Pulse: 70   SpO2: 97%   Weight: 106 kg (234 lb)   Height: 188 cm (74\")     Body mass index is 30.04 kg/m².    PHYSICAL EXAM:    General Appearance:   · well developed  · well nourished  HENT:   · oropharynx moist  · lips not cyanotic  Neck:  · thyroid not enlarged  · supple  Respiratory:  · no respiratory distress  · normal breath sounds  · no rales  Cardiovascular:  · no jugular venous distention  · Irregular iregular rhythm  · apical impulse normal  · S1 normal, S2 normal  · no S3, no S4   · no murmur  · no rub, no thrill  · carotid pulses normal; no bruit  · pedal pulses normal  · lower extremity edema: none    Gastrointestinal:   · bowel sounds normal  · non-tender  · no hepatomegaly, no " splenomegaly  Musculoskeletal:  · no clubbing of fingers.   · normocephalic, head atraumatic  Skin:   · warm, dry  Psychiatric:  · judgement and insight appropriate  · normal mood and affect    RESULTS:     ECG 12 Lead    Date/Time: 11/14/2022 3:16 PM  Performed by: Shaun Parker MD  Authorized by: Shaun Parker MD   Comparison: compared with previous ECG from 11/9/2022  Similar to previous ECG  Rhythm: atrial flutter  BPM: 68  QRS axis: normal    Clinical impression: abnormal EKG            Results for orders placed during the hospital encounter of 04/19/21    Adult Transthoracic Echo Complete W/ Cont if Necessary Per Protocol    Interpretation Summary  · Left ventricular ejection fraction appears to be 61 - 65%. Left ventricular systolic function is normal.  · Estimated right ventricular systolic pressure from tricuspid regurgitation is normal (<35 mmHg). Calculated right ventricular systolic pressure from tricuspid regurgitation is 26 mmHg.  · Left ventricular diastolic function was normal.  · No significant structural or functional valvular disease.        Labs:  Lab Results   Component Value Date    CHOL 302 (H) 11/04/2022    TRIG 189 (H) 11/04/2022    HDL 35 (L) 11/04/2022     (H) 11/04/2022    AST 19 11/04/2022    ALT 22 11/04/2022     Lab Results   Component Value Date    HGBA1C 6.10 (H) 07/19/2022     No components found for: CREATINININE  eGFR Non  Am   Date Value Ref Range Status   11/23/2021 >60 >60 mL/min/1.73m*2 Final     Comment:     eGFR = estimated GFR; eGFR units = mL/min/1.73 sq meters Chronic Kidney Disease is considered if eGFR <60 mL/min/1.73 sq meters Kidney failure is considered if eGFR is <15 mL/min/1.73 sq meters. eGFR assumes steady state plasma creatinine concentration; not applicable if renal function is rapidly changing or patient is on dialysis.     eGFR Non  Amer   Date Value Ref Range Status   05/06/2020 77 >60 mL/min/1.73 Final          ASSESSMENT:  Problem List Items Addressed This Visit        Cardiac and Vasculature    A-fib ([facility]) - Primary    Overview     5/6/20: successful ECV    2/20/20 Echo  · LVEF 40-50%  · Right ventricular moderately dilated  · Right atrium moderately dilated  · Normal appearing atrial PFO closure device  · No significant valvular abnormalities    1/17/17 Echo  · LVEF >55%  · Normal PFO closure device  · No significant valvular abnormalities     Echo 4/19/21:  • Left ventricular ejection fraction appears to be 61 - 65%. Left ventricular systolic function is normal.  • Estimated right ventricular systolic pressure from tricuspid regurgitation is normal (<35 mmHg). Calculated right ventricular systolic pressure from tricuspid regurgitation is 26 mmHg.  • Left ventricular diastolic function was normal.  • No significant structural or functional valvular disease.    Stress with MPS 4/26/2021:  • Myocardial perfusion imaging indicates a normal myocardial perfusion study with no evidence of ischemia.  • Patient denied any chest discomfort/pain during exercise; did have some noticable shortness of breath during stage 2.  • Expected exercise duration 6:40, actual 7:15; ELIZABET (-10). patient requested that the treadmill be slowed/lower while the cardiolite circulated for one minute.  • Left ventricular ejection fraction is hyperdynamic (Calculated EF > 70%).  • Findings consistent with a normal ECG stress test.  • Impressions are consistent with a low risk study.  • No coronary calcium noted.    Electrical Cardioversion 7/19/22:  · Successful cardioversion. Patient displayed sinus rhythm post CV.           Essential hypertension    Hyperlipidemia LDL goal <100    History of percutaneous transcatheter closure of congenital ASD    Overview     2003.            Sleep    NATHAN (obstructive sleep apnea) - possible       PLAN:    1.  Paroxysmal atrial fibrillation/atrial flutter:  Successful ECV on 5/6/20 unsuccessful ECV 11/4/2022    Patient would prefer ablation at this time    Continue uninterrupted Eliquis and metoprolol    Discontinue flecainide today, in anticipation of possible EP study/ablation    Discussed with Dr. Ruvalcaba, history of percutaneous ASD closure complicates left atrial access for ablation, therefore he recommends SENAIT, we will arrange for next week.  Tentatively the patient may be able to have ablation the following week.    2.  Hypertension:  Low normal recently after starting metoprolol, discontinue flecainide, increase hydration    3.  Hyperlipidemia:  Well-controlled, continue simvastatin 40 mg daily    4.  Decreased exercise tolerance:  Echocardiogram 4/19/21 LVEF 61-65%, no valvular disease  Stress test 4/19/21 no evidence of ischemia   Likely due to arrhythmia    5.  Bradycardia:  Now with atrial flutter, variable block, heart rate 68, okay to continue metoprolol, will discontinue flecainide today.      Return to clinic in 3-4 months    Thank you for the opportunity to share in the care of your patient; please do not hesitate to call me with any questions.     Shaun Parker MD, [facility]  Office: [phone] [address]  [address] [place] [address]

## 2022-11-14 NOTE — PROGRESS NOTES
OFFICE VISIT  NOTE  [facility] CARDIOLOGY [place]      Name: Shaun Tyler    Date: 2022  MRN:  [id number]  :  1945      REFERRING/PRIMARY PROVIDER:  Alejandro Richard PA-C    Chief Complaint   Patient presents with   • PAF   • Dizziness   • Shortness of Breath       HPI: Shaun Tyler is a 77 y.o. male who presents today for follow-up for a-fib.  Associated history of percutaneous ASD closure in , hypertension, hyperlipidemia.  Work-up at  2020 includes echocardiogram showing EF of 40 to 50%, mild LA enlargement, moderate RA enlargement, normal pulmonary pressures, ASD closure device in place.   Started flecainide 3/23/2020, underwent successful external cardioversion 2020.  Low risk stress test and echo 2021  Back in A. fib over the last 4-5 months, had some dizziness, flecainide was decreased to help with dizziness but did not, he has been weak, legs still weak.  Increase flecainide to 100 mg twice daily and had cardioversion 2020, unfortunately it was unsuccessful after 2 weeks he was back in atrial flutter with variable block.  He has come to our office several times due to weakness, fatigue, dyspnea    Past Medical History:   Diagnosis Date   • A-fib (HCC)    • Atrial septal defect    • Chicken pox    • Hiatal hernia    • Measles    • Mumps    • S/P patch closure of atrial septal defect        Past Surgical History:   Procedure Laterality Date   • ATRIAL SEPTAL DEFECT REPAIR     • CARDIOVERSION      2022 per Dr. Parker   • WISDOM TOOTH EXTRACTION         Social History     Socioeconomic History   • Marital status:    Tobacco Use   • Smoking status: Never   • Smokeless tobacco: Never   Vaping Use   • Vaping Use: Never used   Substance and Sexual Activity   • Alcohol use: Yes   • Drug use: Never   • Sexual activity: Defer       Family History   Problem Relation Age of Onset   • Cancer Mother    • Pneumonia Sister         ROS:  "  Constitutional no fever,  no weight loss   Skin no rash, no subcutaneous nodules   Otolaryngeal no difficulty swallowing   Cardiovascular See HPI   Pulmonary no cough, no sputum production   Gastrointestinal no constipation, no diarrhea   Genitourinary no dysuria, no hematuria   Hematologic no easy bruisability, no abnormal bleeding   Musculoskeletal no muscle pain   Neurologic no dizziness, no falls         Allergies   Allergen Reactions   • Penicillins Itching   • Simvastatin Myalgia         Current Outpatient Medications:   •  Eliquis 5 MG tablet tablet, TAKE ONE TABLET BY MOUTH TWICE A DAY, Disp: 180 tablet, Rfl: 3  •  ferrous sulfate 325 (65 FE) MG tablet, Take 325 mg by mouth 1 (One) Time Per Week., Disp: , Rfl:   •  fluticasone (FLONASE) 50 MCG/ACT nasal spray, 2 sprays into the nostril(s) as directed by provider Daily., Disp: , Rfl:   •  lansoprazole (PREVACID) 30 MG capsule, Take 30 mg by mouth Daily., Disp: , Rfl:   •  metoprolol tartrate (LOPRESSOR) 25 MG tablet, Take 1 tablet by mouth 2 (Two) Times a Day., Disp: 60 tablet, Rfl: 11  •  psyllium (METAMUCIL) 58.6 % packet, Take 1 packet by mouth Daily., Disp: , Rfl:     Vitals:    11/14/22 1445   BP: 124/70   BP Location: Right arm   Patient Position: Sitting   Pulse: 70   SpO2: 97%   Weight: 106 kg (234 lb)   Height: 188 cm (74\")     Body mass index is 30.04 kg/m².    PHYSICAL EXAM:    General Appearance:   · well developed  · well nourished  HENT:   · oropharynx moist  · lips not cyanotic  Neck:  · thyroid not enlarged  · supple  Respiratory:  · no respiratory distress  · normal breath sounds  · no rales  Cardiovascular:  · no jugular venous distention  · Irregular iregular rhythm  · apical impulse normal  · S1 normal, S2 normal  · no S3, no S4   · no murmur  · no rub, no thrill  · carotid pulses normal; no bruit  · pedal pulses normal  · lower extremity edema: none    Gastrointestinal:   · bowel sounds normal  · non-tender  · no hepatomegaly, no " splenomegaly  Musculoskeletal:  · no clubbing of fingers.   · normocephalic, head atraumatic  Skin:   · warm, dry  Psychiatric:  · judgement and insight appropriate  · normal mood and affect    RESULTS:     ECG 12 Lead    Date/Time: 11/14/2022 3:16 PM  Performed by: Shaun Parker MD  Authorized by: Shaun Parker MD   Comparison: compared with previous ECG from 11/9/2022  Similar to previous ECG  Rhythm: atrial flutter  BPM: 68  QRS axis: normal    Clinical impression: abnormal EKG            Results for orders placed during the hospital encounter of 04/19/21    Adult Transthoracic Echo Complete W/ Cont if Necessary Per Protocol    Interpretation Summary  · Left ventricular ejection fraction appears to be 61 - 65%. Left ventricular systolic function is normal.  · Estimated right ventricular systolic pressure from tricuspid regurgitation is normal (<35 mmHg). Calculated right ventricular systolic pressure from tricuspid regurgitation is 26 mmHg.  · Left ventricular diastolic function was normal.  · No significant structural or functional valvular disease.        Labs:  Lab Results   Component Value Date    CHOL 302 (H) 11/04/2022    TRIG 189 (H) 11/04/2022    HDL 35 (L) 11/04/2022     (H) 11/04/2022    AST 19 11/04/2022    ALT 22 11/04/2022     Lab Results   Component Value Date    HGBA1C 6.10 (H) 07/19/2022     No components found for: CREATINININE  eGFR Non  Am   Date Value Ref Range Status   11/23/2021 >60 >60 mL/min/1.73m*2 Final     Comment:     eGFR = estimated GFR; eGFR units = mL/min/1.73 sq meters Chronic Kidney Disease is considered if eGFR <60 mL/min/1.73 sq meters Kidney failure is considered if eGFR is <15 mL/min/1.73 sq meters. eGFR assumes steady state plasma creatinine concentration; not applicable if renal function is rapidly changing or patient is on dialysis.     eGFR Non  Amer   Date Value Ref Range Status   05/06/2020 77 >60 mL/min/1.73 Final          ASSESSMENT:  Problem List Items Addressed This Visit        Cardiac and Vasculature    A-fib ([facility]) - Primary    Overview     5/6/20: successful ECV    2/20/20 Echo  · LVEF 40-50%  · Right ventricular moderately dilated  · Right atrium moderately dilated  · Normal appearing atrial PFO closure device  · No significant valvular abnormalities    1/17/17 Echo  · LVEF >55%  · Normal PFO closure device  · No significant valvular abnormalities     Echo 4/19/21:  • Left ventricular ejection fraction appears to be 61 - 65%. Left ventricular systolic function is normal.  • Estimated right ventricular systolic pressure from tricuspid regurgitation is normal (<35 mmHg). Calculated right ventricular systolic pressure from tricuspid regurgitation is 26 mmHg.  • Left ventricular diastolic function was normal.  • No significant structural or functional valvular disease.    Stress with MPS 4/26/2021:  • Myocardial perfusion imaging indicates a normal myocardial perfusion study with no evidence of ischemia.  • Patient denied any chest discomfort/pain during exercise; did have some noticable shortness of breath during stage 2.  • Expected exercise duration 6:40, actual 7:15; ELIZABET (-10). patient requested that the treadmill be slowed/lower while the cardiolite circulated for one minute.  • Left ventricular ejection fraction is hyperdynamic (Calculated EF > 70%).  • Findings consistent with a normal ECG stress test.  • Impressions are consistent with a low risk study.  • No coronary calcium noted.    Electrical Cardioversion 7/19/22:  · Successful cardioversion. Patient displayed sinus rhythm post CV.           Essential hypertension    Hyperlipidemia LDL goal <100    History of percutaneous transcatheter closure of congenital ASD    Overview     2003.            Sleep    NATHAN (obstructive sleep apnea) - possible       PLAN:    1.  Paroxysmal atrial fibrillation/atrial flutter:  Successful ECV on 5/6/20 unsuccessful ECV 11/4/2022    Patient would prefer ablation at this time    Continue uninterrupted Eliquis and metoprolol    Discontinue flecainide today, in anticipation of possible EP study/ablation    Discussed with Dr. Ruvalcaba, history of percutaneous ASD closure complicates left atrial access for ablation, therefore he recommends SENAIT, we will arrange for next week.  Tentatively the patient may be able to have ablation the following week.    2.  Hypertension:  Low normal recently after starting metoprolol, discontinue flecainide, increase hydration    3.  Hyperlipidemia:  Well-controlled, continue simvastatin 40 mg daily    4.  Decreased exercise tolerance:  Echocardiogram 4/19/21 LVEF 61-65%, no valvular disease  Stress test 4/19/21 no evidence of ischemia   Likely due to arrhythmia    5.  Bradycardia:  Now with atrial flutter, variable block, heart rate 68, okay to continue metoprolol, will discontinue flecainide today.      Return to clinic in 3-4 months    Thank you for the opportunity to share in the care of your patient; please do not hesitate to call me with any questions.     Shaun Parker MD, [facility]  Office: [phone] [address] [facility]  [address] [place] [address]

## 2022-11-21 ENCOUNTER — PREP FOR SURGERY (OUTPATIENT)
Dept: OTHER | Facility: HOSPITAL | Age: 77
End: 2022-11-21

## 2022-11-21 RX ORDER — SODIUM CHLORIDE 0.9 % (FLUSH) 0.9 %
10 SYRINGE (ML) INJECTION AS NEEDED
Status: CANCELLED | OUTPATIENT
Start: 2022-11-21

## 2022-11-21 RX ORDER — SODIUM CHLORIDE 9 MG/ML
40 INJECTION, SOLUTION INTRAVENOUS AS NEEDED
Status: CANCELLED | OUTPATIENT
Start: 2022-11-21

## 2022-11-21 RX ORDER — SODIUM CHLORIDE 0.9 % (FLUSH) 0.9 %
10 SYRINGE (ML) INJECTION EVERY 12 HOURS SCHEDULED
Status: CANCELLED | OUTPATIENT
Start: 2022-11-21

## 2022-11-22 ENCOUNTER — HOSPITAL ENCOUNTER (OUTPATIENT)
Dept: CARDIOLOGY | Facility: HOSPITAL | Age: 77
Discharge: HOME | End: 2022-11-22
Admitting: INTERNAL MEDICINE

## 2022-11-22 VITALS
SYSTOLIC BLOOD PRESSURE: 145 MMHG | BODY MASS INDEX: 30.03 KG/M2 | RESPIRATION RATE: 15 BRPM | HEART RATE: 62 BPM | DIASTOLIC BLOOD PRESSURE: 97 MMHG | HEIGHT: 74 IN | WEIGHT: 234 LBS | OXYGEN SATURATION: 99 %

## 2022-11-22 DIAGNOSIS — Z87.74 HISTORY OF PERCUTANEOUS TRANSCATHETER CLOSURE OF CONGENITAL ASD: ICD-10-CM

## 2022-11-22 DIAGNOSIS — I48.19 PERSISTENT ATRIAL FIBRILLATION: ICD-10-CM

## 2022-11-22 LAB
BH CV ECHO MEAS - RVSP: 30 MMHG
BH CV ECHO MEAS - TR MAX PG: 25.2 MMHG
BH CV ECHO MEAS - TR MAX VEL: 250.8 CM/SEC
MAXIMAL PREDICTED HEART RATE: 145 BPM
STRESS TARGET HR: 123 BPM

## 2022-11-22 PROCEDURE — 93312 ECHO TRANSESOPHAGEAL: CPT

## 2022-11-22 PROCEDURE — 93325 DOPPLER ECHO COLOR FLOW MAPG: CPT

## 2022-11-22 PROCEDURE — 93321 DOPPLER ECHO F-UP/LMTD STD: CPT

## 2022-11-22 PROCEDURE — 93312 ECHO TRANSESOPHAGEAL: CPT | Performed by: INTERNAL MEDICINE

## 2022-11-22 PROCEDURE — 25010000002 MIDAZOLAM PER 1 MG: Performed by: INTERNAL MEDICINE

## 2022-11-22 PROCEDURE — 93325 DOPPLER ECHO COLOR FLOW MAPG: CPT | Performed by: INTERNAL MEDICINE

## 2022-11-22 PROCEDURE — 99152 MOD SED SAME PHYS/QHP 5/>YRS: CPT

## 2022-11-22 PROCEDURE — 93321 DOPPLER ECHO F-UP/LMTD STD: CPT | Performed by: INTERNAL MEDICINE

## 2022-11-22 RX ORDER — MIDAZOLAM HYDROCHLORIDE 1 MG/ML
INJECTION INTRAMUSCULAR; INTRAVENOUS
Status: COMPLETED | OUTPATIENT
Start: 2022-11-22 | End: 2022-11-22

## 2022-11-22 RX ADMIN — MIDAZOLAM HYDROCHLORIDE 2 MG: 1 INJECTION, SOLUTION INTRAMUSCULAR; INTRAVENOUS at 09:21

## 2022-11-22 NOTE — INTERVAL H&P NOTE
H&P reviewed. The patient was examined and there are no changes to the H&P.      Vitals and Labs:    There were no vitals filed for this visit.    Lab Results   Component Value Date    WBC 6.70 11/04/2022    HGB 16.8 11/04/2022    HCT 50.3 11/04/2022    MCV 96.5 11/04/2022     11/04/2022     Lab Results   Component Value Date    GLUCOSE 100 (H) 11/04/2022    BUN 16 11/04/2022    CREATININE 1.21 11/04/2022    EGFRIFNONA >60 11/23/2021    EGFRIFAFRI >60 11/23/2021    BCR 13.2 11/04/2022    K 4.6 11/04/2022    CO2 23.0 11/04/2022    CALCIUM 8.9 11/04/2022    ALBUMIN 4.10 11/04/2022    AST 19 11/04/2022    ALT 22 11/04/2022

## 2022-12-01 PROCEDURE — 93000 ELECTROCARDIOGRAM COMPLETE: CPT | Performed by: INTERNAL MEDICINE

## 2022-12-01 NOTE — H&P (VIEW-ONLY)
Cardiac Electrophysiology Outpatient Consult Note            [facility]    Consult Note     Shaun Tyler  [id number]  12/05/2022  [phone]     Primary Care Physician: Alejandro Richard PA-C    Referred By: Shaun Parker MD    Subjective     Chief Complaint:   Diagnoses and all orders for this visit:    1. Persistent atrial fibrillation (HCC) (Primary)  -     Case Request EP Lab: PVA (persistent), Rythmia poss STX, no meds to hold  -     CT Angiogram Chest; Future    2. Essential hypertension    3. NATHAN (obstructive sleep apnea) - possible    4. Congenital septal defect of heart    Other orders  -     ECG 12 Lead      Chief Complaint   Patient presents with   • Atrial Fibrillation   • Shortness of Breath       History of Present Illness:   Shaun Tyler is a 77 y.o. male who presents to my electrophysiology clinic for evaluation of the above complaints.  Mr. Tyler is here for discussion about A. fib ablation.  He is referred by his cardiologist Dr. Parker.    This visit today was preceded by several lengthy conversations with Dr. Parker about this patient's history including ASD closure.    Mr. Tyler tells me that he feels terrible when he is in A. fib.  He is fatigued short of breath.  After his last cardioversion he felt great for 4 days.  Then when the A. fib came back he clearly noted and he felt fatigued tired with very little energy.    He tells me that he is highly motivated to have something done to make his highly symptomatic A. fib better.    Presently no chest pain nausea vomit fevers or chills.  Does have fatigue and shortness of breath.  Knows that he is in A. fib presently    Past Medical History:   Patient Active Problem List    Diagnosis Date Noted   • NATHAN (obstructive sleep apnea) - possible 11/02/2022   • Snoring 11/02/2022   • Bradycardia 03/05/2021   • A-fib (HCC) 03/19/2020     Note Last Updated: 12/1/2022     · 5/6/20:  successful ECV  · Echo 4/19/21: EF 61 - 65%. Left ventricular systolic function is normal. No significant structural or functional valvular disease.  · MPS 4/26/2021:no evidence of ischemia.Left ventricular ejection fraction is hyperdynamic (Calculated EF > 70%).  · ECV 7/19/22:     • Essential hypertension 03/19/2020   • Hyperlipidemia LDL goal <100 03/19/2020   • Congenital septal defect of heart 03/19/2020     Note Last Updated: 12/1/2022     · Hx of ASD closure, in [place], approximately 2003  · SENAIT, 11/22/2022: Normal LV, EF 55-60%.  Amplatzer atrial septal occluder device with normal appearance and no residual leak.  Normal left atrial appendage         Past Surgical History:   Past Surgical History:   Procedure Laterality Date   • ATRIAL SEPTAL DEFECT REPAIR  2005   • CARDIOVERSION      11/4/2022 per Dr. Parker   • WISDOM TOOTH EXTRACTION         Social History:   Social History     Socioeconomic History   • Marital status:    Tobacco Use   • Smoking status: Never   • Smokeless tobacco: Never   Vaping Use   • Vaping Use: Never used   Substance and Sexual Activity   • Alcohol use: Yes   • Drug use: Never   • Sexual activity: Defer       Medications:     Current Outpatient Medications:   •  Eliquis 5 MG tablet tablet, TAKE ONE TABLET BY MOUTH TWICE A DAY, Disp: 180 tablet, Rfl: 3  •  ferrous sulfate 325 (65 FE) MG tablet, Take 325 mg by mouth 1 (One) Time Per Week., Disp: , Rfl:   •  fluticasone (FLONASE) 50 MCG/ACT nasal spray, 2 sprays into the nostril(s) as directed by provider Daily., Disp: , Rfl:   •  lansoprazole (PREVACID) 30 MG capsule, Take 30 mg by mouth Daily., Disp: , Rfl:   •  metoprolol tartrate (LOPRESSOR) 25 MG tablet, Take 1 tablet by mouth 2 (Two) Times a Day., Disp: 60 tablet, Rfl: 11  •  psyllium (METAMUCIL) 58.6 % packet, Take 1 packet by mouth Daily., Disp: , Rfl:     Allergies:   Allergies   Allergen Reactions   • Penicillins Itching   • Simvastatin Myalgia       Objective   Vital  Signs:   Vitals:    12/05/22 1114   BP: 138/70   BP Location: Left arm   Patient Position: Sitting   Pulse: 66   SpO2: 98%   Weight: 103 kg (228 lb)   Height: 195.6 cm (77\")       PHYSICAL EXAM  General appearance: Awake, alert, cooperative  Head: Normocephalic, without obvious abnormality, atraumatic  Eyes: Conjunctivae/corneas clear, EOMs intact  Neck: no adenopathy, no carotid bruit, no JVD and thyroid: not enlarged  Lungs: clear to auscultation bilaterally and no rhonchi or crackles\", ' symmetric  Heart: irregularly irregular rhythm  Abdomen: Soft, non-tender, bowel sounds normal,  no organomegaly  Extremities: extremities normal, atraumatic, no cyanosis or edema  Skin: Skin color, turgor normal, no rashes or lesions  Neurologic: Grossly normal     Lab Results   Component Value Date    GLUCOSE 100 (H) 11/04/2022    CALCIUM 8.9 11/04/2022     11/04/2022    K 4.6 11/04/2022    CO2 23.0 11/04/2022     11/04/2022    BUN 16 11/04/2022    CREATININE 1.21 11/04/2022    EGFRIFAFRI >60 11/23/2021    EGFRIFNONA >60 11/23/2021    BCR 13.2 11/04/2022    ANIONGAP 11.0 11/04/2022     Lab Results   Component Value Date    WBC 7.32 11/29/2022    HGB 17.5 11/29/2022    HCT 50.3 11/29/2022    MCV 93 11/29/2022     11/29/2022     Cardiac Testing:      I personally viewed and interpreted the patient's EKG/Telemetry/lab data      ECG 12 Lead    Date/Time: 12/1/2022 3:06 PM  Performed by: Escobar Ruvalcaba DO  Authorized by: Escobar Ruvalcaba DO   Comparison: compared with previous ECG   Similar to previous ECG  Rhythm: atrial fibrillation            Tobacco Cessation: N/A  Obstructive Sleep Apnea Screening: Completed    Assessment & Plan    Diagnoses and all orders for this visit:    1. Persistent atrial fibrillation (HCC) (Primary)  -     Case Request EP Lab: PVA (persistent), Rythmia poss STX, no meds to hold  -     CT Angiogram Chest; Future    2. Essential hypertension    3. NATHAN (obstructive sleep apnea) -  possible    4. Congenital septal defect of heart    Other orders  -     ECG 12 Lead         Diagnosis Plan   1. 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    Lengthy conversation with him about options including repeated attempts of pharmacotherapy.  Cardioversion.  We also discussed the option of percutaneous left atrial ablation.  A SENAIT performed recently suggest that there may be enough of an area around the ASD occluder that we can safely perform transseptal access.  Alternatively it may be possible to perform a retrograde approach via the femoral artery, aorta, left ventricle ultimately arriving in the left atrium using the [employer] remote magnetic navigation system.  A third option we discussed would be to perform surgical ablation with referral to Dr. Tavares in [place].  This particular physician's expertise and off-pump extensive epicardial ablation and left atrial appendage resection.    After lengthy conversation in full detail discussion the patient wished to proceed with percutaneous ablation performed here.    I reviewed the specific risk-benefit profile the procedure.  I quoted the patient a 1 to 2% chance of significant procedure complication including but not limited to bleeding at the groin, cardiac perforation, tamponade, stroke, myocardial infarction, death phrenic nerve injury, pulmonary vein stenosis, catheter entrapment of the mitral valve annulus, esophageal injury as well as fistula and other potential complications.    The patient and I made a mutually shared decision ( shared decision making model ) that the patient would be best served by proceeding with the above invasive heart procedure.  This is a high risk invasive cardiac procedure which comes with significant risk of morbidity and mortality.  This patient has significant underlying  heart disease.   Shaun Tyler understands these risks and   has made an informed decision to proceed.      Case Request EP Lab: PVA (persistent), Rythmia poss STX, no meds to hold    CT Angiogram Chest      2. Essential hypertension   blood pressure well controlled.      3. NATHAN (obstructive sleep apnea) - possible   CPAP.      4. Congenital septal defect of heart   secundum ASD with ASD occluder.  As above.        Body mass index is 27.04 kg/m².    I spent 61 minutes in consultation with this patient which included more than 65% of this time in direct face-to-face counseling, physical examination and discussion of my assessment and findings and shared decision making with the patient.  The remainder of the time not spent face to face was performing one, some or all of the following actions:  preparing to see this patient ( eg. Review of tests),  ordering medications, tests or procedures ), care coordination, discussion of the plan with other healthcare providers, documenting clinical information in Epic well as independently interpreting results and communicating results to patient, family and or caregiver.  All time noted occurred on the date of service.    Follow Up:       Thank you for allowing me to participate in the care of your patient. Please to not hesitate to contact me with additional questions or concerns.      Escobar Ruvalcaba DO, FACC, RS  Cardiac Electrophysiologist  [facility] / [facility]    Scribed for Escobar Ruvalcaba DO

## 2022-12-01 NOTE — PROGRESS NOTES
Cardiac Electrophysiology Outpatient Consult Note            [facility]    Consult Note     Shaun Tyler  [id number]  12/05/2022  [phone]     Primary Care Physician: Alejandro Richard PA-C    Referred By: Shaun Parker MD    Subjective     Chief Complaint:   Diagnoses and all orders for this visit:    1. Persistent atrial fibrillation (HCC) (Primary)  -     Case Request EP Lab: PVA (persistent), Rythmia poss STX, no meds to hold  -     CT Angiogram Chest; Future    2. Essential hypertension    3. NATHAN (obstructive sleep apnea) - possible    4. Congenital septal defect of heart    Other orders  -     ECG 12 Lead      Chief Complaint   Patient presents with   • Atrial Fibrillation   • Shortness of Breath       History of Present Illness:   Shaun Tyler is a 77 y.o. male who presents to my electrophysiology clinic for evaluation of the above complaints.  Mr. Tyler is here for discussion about A. fib ablation.  He is referred by his cardiologist Dr. Parker.    This visit today was preceded by several lengthy conversations with Dr. Parker about this patient's history including ASD closure.    Mr. Tyler tells me that he feels terrible when he is in A. fib.  He is fatigued short of breath.  After his last cardioversion he felt great for 4 days.  Then when the A. fib came back he clearly noted and he felt fatigued tired with very little energy.    He tells me that he is highly motivated to have something done to make his highly symptomatic A. fib better.    Presently no chest pain nausea vomit fevers or chills.  Does have fatigue and shortness of breath.  Knows that he is in A. fib presently    Past Medical History:   Patient Active Problem List    Diagnosis Date Noted   • NATHAN (obstructive sleep apnea) - possible 11/02/2022   • Snoring 11/02/2022   • Bradycardia 03/05/2021   • A-fib (HCC) 03/19/2020     Note Last Updated: 12/1/2022     · 5/6/20:  successful ECV  · Echo 4/19/21: EF 61 - 65%. Left ventricular systolic function is normal. No significant structural or functional valvular disease.  · MPS 4/26/2021:no evidence of ischemia.Left ventricular ejection fraction is hyperdynamic (Calculated EF > 70%).  · ECV 7/19/22:     • Essential hypertension 03/19/2020   • Hyperlipidemia LDL goal <100 03/19/2020   • Congenital septal defect of heart 03/19/2020     Note Last Updated: 12/1/2022     · Hx of ASD closure, in [place], approximately 2003  · SENAIT, 11/22/2022: Normal LV, EF 55-60%.  Amplatzer atrial septal occluder device with normal appearance and no residual leak.  Normal left atrial appendage         Past Surgical History:   Past Surgical History:   Procedure Laterality Date   • ATRIAL SEPTAL DEFECT REPAIR  2005   • CARDIOVERSION      11/4/2022 per Dr. Parker   • WISDOM TOOTH EXTRACTION         Social History:   Social History     Socioeconomic History   • Marital status:    Tobacco Use   • Smoking status: Never   • Smokeless tobacco: Never   Vaping Use   • Vaping Use: Never used   Substance and Sexual Activity   • Alcohol use: Yes   • Drug use: Never   • Sexual activity: Defer       Medications:     Current Outpatient Medications:   •  Eliquis 5 MG tablet tablet, TAKE ONE TABLET BY MOUTH TWICE A DAY, Disp: 180 tablet, Rfl: 3  •  ferrous sulfate 325 (65 FE) MG tablet, Take 325 mg by mouth 1 (One) Time Per Week., Disp: , Rfl:   •  fluticasone (FLONASE) 50 MCG/ACT nasal spray, 2 sprays into the nostril(s) as directed by provider Daily., Disp: , Rfl:   •  lansoprazole (PREVACID) 30 MG capsule, Take 30 mg by mouth Daily., Disp: , Rfl:   •  metoprolol tartrate (LOPRESSOR) 25 MG tablet, Take 1 tablet by mouth 2 (Two) Times a Day., Disp: 60 tablet, Rfl: 11  •  psyllium (METAMUCIL) 58.6 % packet, Take 1 packet by mouth Daily., Disp: , Rfl:     Allergies:   Allergies   Allergen Reactions   • Penicillins Itching   • Simvastatin Myalgia       Objective   Vital  "Signs:   Vitals:    12/05/22 1114   BP: 138/70   BP Location: Left arm   Patient Position: Sitting   Pulse: 66   SpO2: 98%   Weight: 103 kg (228 lb)   Height: 195.6 cm (77\")       PHYSICAL EXAM  General appearance: Awake, alert, cooperative  Head: Normocephalic, without obvious abnormality, atraumatic  Eyes: Conjunctivae/corneas clear, EOMs intact  Neck: no adenopathy, no carotid bruit, no JVD and thyroid: not enlarged  Lungs: clear to auscultation bilaterally and no rhonchi or crackles\", ' symmetric  Heart: irregularly irregular rhythm  Abdomen: Soft, non-tender, bowel sounds normal,  no organomegaly  Extremities: extremities normal, atraumatic, no cyanosis or edema  Skin: Skin color, turgor normal, no rashes or lesions  Neurologic: Grossly normal     Lab Results   Component Value Date    GLUCOSE 100 (H) 11/04/2022    CALCIUM 8.9 11/04/2022     11/04/2022    K 4.6 11/04/2022    CO2 23.0 11/04/2022     11/04/2022    BUN 16 11/04/2022    CREATININE 1.21 11/04/2022    EGFRIFAFRI >60 11/23/2021    EGFRIFNONA >60 11/23/2021    BCR 13.2 11/04/2022    ANIONGAP 11.0 11/04/2022     Lab Results   Component Value Date    WBC 7.32 11/29/2022    HGB 17.5 11/29/2022    HCT 50.3 11/29/2022    MCV 93 11/29/2022     11/29/2022     Cardiac Testing:      I personally viewed and interpreted the patient's EKG/Telemetry/lab data      ECG 12 Lead    Date/Time: 12/1/2022 3:06 PM  Performed by: Escobar Ruvalcaba DO  Authorized by: Escobar Ruvalcaba DO   Comparison: compared with previous ECG   Similar to previous ECG  Rhythm: atrial fibrillation            Tobacco Cessation: N/A  Obstructive Sleep Apnea Screening: Completed    Assessment & Plan    Diagnoses and all orders for this visit:    1. Persistent atrial fibrillation (HCC) (Primary)  -     Case Request EP Lab: PVA (persistent), Rythmia poss STX, no meds to hold  -     CT Angiogram Chest; Future    2. Essential hypertension    3. NATHAN (obstructive sleep apnea) - " possible    4. Congenital septal defect of heart    Other orders  -     ECG 12 Lead         Diagnosis Plan   1. 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    Lengthy conversation with him about options including repeated attempts of pharmacotherapy.  Cardioversion.  We also discussed the option of percutaneous left atrial ablation.  A SENAIT performed recently suggest that there may be enough of an area around the ASD occluder that we can safely perform transseptal access.  Alternatively it may be possible to perform a retrograde approach via the femoral artery, aorta, left ventricle ultimately arriving in the left atrium using the PushSpring remote magnetic navigation system.  A third option we discussed would be to perform surgical ablation with referral to Dr. Tavares in [place].  This particular physician's expertise and off-pump extensive epicardial ablation and left atrial appendage resection.    After lengthy conversation in full detail discussion the patient wished to proceed with percutaneous ablation performed here.    I reviewed the specific risk-benefit profile the procedure.  I quoted the patient a 1 to 2% chance of significant procedure complication including but not limited to bleeding at the groin, cardiac perforation, tamponade, stroke, myocardial infarction, death phrenic nerve injury, pulmonary vein stenosis, catheter entrapment of the mitral valve annulus, esophageal injury as well as fistula and other potential complications.    The patient and I made a mutually shared decision ( shared decision making model ) that the patient would be best served by proceeding with the above invasive heart procedure.  This is a high risk invasive cardiac procedure which comes with significant risk of morbidity and mortality.  This patient has significant underlying  heart disease.   Shaun Tyler understands these risks and   has made an informed decision to proceed.      Case Request EP Lab: PVA (persistent), Rythmia poss STX, no meds to hold    CT Angiogram Chest      2. Essential hypertension   blood pressure well controlled.      3. NATHAN (obstructive sleep apnea) - possible   CPAP.      4. Congenital septal defect of heart   secundum ASD with ASD occluder.  As above.        Body mass index is 27.04 kg/m².    I spent 61 minutes in consultation with this patient which included more than 65% of this time in direct face-to-face counseling, physical examination and discussion of my assessment and findings and shared decision making with the patient.  The remainder of the time not spent face to face was performing one, some or all of the following actions:  preparing to see this patient ( eg. Review of tests),  ordering medications, tests or procedures ), care coordination, discussion of the plan with other healthcare providers, documenting clinical information in Epic well as independently interpreting results and communicating results to patient, family and or caregiver.  All time noted occurred on the date of service.    Follow Up:       Thank you for allowing me to participate in the care of your patient. Please to not hesitate to contact me with additional questions or concerns.      Escobar Ruvalcaba DO, FACC, RS  Cardiac Electrophysiologist  [facility]    Scribed for Escobar Ruvalcaba DO

## 2022-12-05 ENCOUNTER — OFFICE VISIT (OUTPATIENT)
Dept: CARDIOLOGY | Facility: CLINIC | Age: 77
End: 2022-12-05

## 2022-12-05 VITALS
WEIGHT: 228 LBS | HEART RATE: 66 BPM | SYSTOLIC BLOOD PRESSURE: 138 MMHG | OXYGEN SATURATION: 98 % | HEIGHT: 77 IN | BODY MASS INDEX: 26.92 KG/M2 | DIASTOLIC BLOOD PRESSURE: 70 MMHG

## 2022-12-05 DIAGNOSIS — I48.19 PERSISTENT ATRIAL FIBRILLATION: Primary | ICD-10-CM

## 2022-12-05 DIAGNOSIS — G47.33 OSA (OBSTRUCTIVE SLEEP APNEA): ICD-10-CM

## 2022-12-05 DIAGNOSIS — Q21.9 CONGENITAL SEPTAL DEFECT OF HEART: ICD-10-CM

## 2022-12-05 DIAGNOSIS — I10 ESSENTIAL HYPERTENSION: ICD-10-CM

## 2022-12-05 PROBLEM — R06.02 SOB (SHORTNESS OF BREATH): Status: RESOLVED | Noted: 2020-03-19 | Resolved: 2022-12-05

## 2022-12-05 PROBLEM — G47.9 FATIGUE DUE TO SLEEP PATTERN DISTURBANCE: Status: RESOLVED | Noted: 2022-11-02 | Resolved: 2022-12-05

## 2022-12-05 PROBLEM — R53.83 FATIGUE DUE TO SLEEP PATTERN DISTURBANCE: Status: RESOLVED | Noted: 2022-11-02 | Resolved: 2022-12-05

## 2022-12-05 PROCEDURE — 99205 OFFICE O/P NEW HI 60 MIN: CPT | Performed by: INTERNAL MEDICINE

## 2022-12-06 ENCOUNTER — PREP FOR SURGERY (OUTPATIENT)
Dept: OTHER | Facility: HOSPITAL | Age: 77
End: 2022-12-06

## 2022-12-06 DIAGNOSIS — I48.0 PAROXYSMAL ATRIAL FIBRILLATION: Primary | ICD-10-CM

## 2022-12-06 RX ORDER — NITROGLYCERIN 0.4 MG/1
0.4 TABLET SUBLINGUAL
Status: CANCELLED | OUTPATIENT
Start: 2022-12-06

## 2022-12-06 RX ORDER — ACETAMINOPHEN 325 MG/1
650 TABLET ORAL EVERY 4 HOURS PRN
Status: CANCELLED | OUTPATIENT
Start: 2022-12-06

## 2022-12-06 RX ORDER — SODIUM CHLORIDE 9 MG/ML
40 INJECTION, SOLUTION INTRAVENOUS AS NEEDED
Status: CANCELLED | OUTPATIENT
Start: 2022-12-06

## 2022-12-06 RX ORDER — ONDANSETRON 2 MG/ML
4 INJECTION INTRAMUSCULAR; INTRAVENOUS EVERY 6 HOURS PRN
Status: CANCELLED | OUTPATIENT
Start: 2022-12-06

## 2022-12-29 DIAGNOSIS — R06.83 SNORING: Primary | ICD-10-CM

## 2022-12-29 NOTE — NURSING NOTE
PRE-PVA ASSESSMENT  Shaun Tyler 1945   [address] [place] [address]   [phone]    Referral Source: Dr Parker  Information obtained from: [x] Medical record review  [x] Patient report  Scheduled for: PVA on 1/4/23 with Dr. Ruvalcaba  Allergies   Allergen Reactions   • Penicillins Itching   • Simvastatin Myalgia       AFib Specific History:  AFIBTYPE: persistent    CHADS-VASc Risk Assessment            3 Total Score    1 Hypertension    2 Age >/= 75        Criteria that do not apply:    CHF    DM    PRIOR STROKE/TIA/THROMBO    Vascular Disease    Age 65-74    Sex: Female        Anticoagulation: Eliquis 5 mg bid NO MISSED DOSES   Cardioversion x 3  Prior Ablation: No    Is Mr. Tyler aware of his AFib? Yes   Onset: 2003    Exacerbations: none   Frequency: persistent  Alleviations: none     Duration: persistent     Symptoms:   [] Palpitations:    [] Chest Discomfort:    [] Dizziness:    [] Presyncope:    [] Lightheadedness:   [] Syncope:    [x] Fatigue:    [] Other:    [x] Short of Breath:     Last Echo(s):  [x] TTE Date: 4/19/21                 LA: 4.5 cm      • Left ventricular ejection fraction appears to be 61 - 65%. Left ventricular systolic function is normal.  • Estimated right ventricular systolic pressure from tricuspid regurgitation is normal (<35 mmHg). Calculated right ventricular systolic pressure from tricuspid regurgitation is 26 mmHg.  • Left ventricular diastolic function was normal.  • No significant structural or functional valvular disease.      SENAIT, 11/22/2022: Normal LV, EF 55-60%.  Amplatzer atrial septal occluder device with normal appearance and no residual leak.  Normal left atrial appendage     Past medical History:   [] Diabetes  No                 Hemoglobin A1C   Date Value Ref Range Status   11/29/2022 6.1 (H) <5.7 % Final   07/19/2022 6.10 (H) 4.80 - 5.60 % Final        [] HYPOthyroidism No  [] HYPERthyroidism No        No results found for: TSH    [x] HTN        [x] Tx  metoprolol         [] \"runs low and up and down with afib\"    [] Heart Failure  No   [] CVA   No                             [] TIA  No        [] Ischemic         [] Hemorrhagic         [] Nonischemic         [] Embolic        [] Diastolic    [] CAD   No       [] MI  No           [x] Dyslipidemia in tolerant to statins- myalgia    [] Statin indicated    [x] Ischemic Evaluation       [x] Stress Test: MPS 4/26/2021:no evidence of ischemia.Left ventricular ejection fraction is hyperdynamic (Calculated EF > 70%).       [] Heart Cath: No    [x] Sleep Apnea Suspected patient states he has not had testing.  Snores, referral placed again.  Previous referral closed     [] Obesity No BMI 27.04    [] Depression   No  [] Anxiety No                 [] Urologic History No       [] Urologic cancer surgery         [] Severe decrease in flow of urine stream        [] Recent unexplained gross hematuria       [] Hx urethral stricture     Other Pertinent PMH: Congenital septal defect of heart, Hx of ASD closure, in [place], approximately 2003    Social / Lifestyle History:   []   Tobacco: Denies     [x]   Alcohol:          [x] Current: 1 glass of wine with dinner   [x]   Caffeine: 1 cup per day   []   Recreational Drugs: No    []   Stress Issues: No    [x]   Exercise: walking    Summary of Patient Contact:    I spoke with Mr. Tyler about his upcoming PVA.   He was well informed about the procedure from prior discussion with Dr. Ruvalcaba and from reading the provided literature.  We discussed the procedure at length including risks, anesthesia, intra-op procedures, recovery, bedrest, sheath removal, discharge criteria, normal post-procedure expectations, and success rates.  I answered a few remaining questions. Mr. Tyler verbalized understanding and he is ready to proceed.      Norma Meneses RN

## 2023-01-03 ENCOUNTER — PRE-ADMISSION TESTING (OUTPATIENT)
Dept: PREADMISSION TESTING | Facility: HOSPITAL | Age: 78
End: 2023-01-03
Payer: COMMERCIAL

## 2023-01-03 ENCOUNTER — HOSPITAL ENCOUNTER (OUTPATIENT)
Dept: CT IMAGING | Facility: HOSPITAL | Age: 78
Discharge: HOME | End: 2023-01-03
Payer: COMMERCIAL

## 2023-01-03 ENCOUNTER — ANESTHESIA EVENT (OUTPATIENT)
Dept: CARDIOLOGY | Facility: HOSPITAL | Age: 78
End: 2023-01-03
Payer: COMMERCIAL

## 2023-01-03 DIAGNOSIS — I48.0 PAROXYSMAL ATRIAL FIBRILLATION: ICD-10-CM

## 2023-01-03 DIAGNOSIS — I48.19 PERSISTENT ATRIAL FIBRILLATION: ICD-10-CM

## 2023-01-03 DIAGNOSIS — I48.19 PERSISTENT ATRIAL FIBRILLATION: Primary | ICD-10-CM

## 2023-01-03 LAB
ANION GAP SERPL CALCULATED.3IONS-SCNC: 10 MMOL/L (ref 5–15)
BUN SERPL-MCNC: 16 MG/DL (ref 8–23)
BUN/CREAT SERPL: 15.4 (ref 7–25)
CALCIUM SPEC-SCNC: 9 MG/DL (ref 8.6–10.5)
CHLORIDE SERPL-SCNC: 103 MMOL/L (ref 98–107)
CO2 SERPL-SCNC: 25 MMOL/L (ref 22–29)
CREAT SERPL-MCNC: 1.04 MG/DL (ref 0.76–1.27)
DEPRECATED RDW RBC AUTO: 45.4 FL (ref 37–54)
EGFRCR SERPLBLD CKD-EPI 2021: 74.9 ML/MIN/1.73
ERYTHROCYTE [DISTWIDTH] IN BLOOD BY AUTOMATED COUNT: 13 % (ref 12.3–15.4)
GLUCOSE SERPL-MCNC: 92 MG/DL (ref 65–99)
HCT VFR BLD AUTO: 47.8 % (ref 37.5–51)
HGB BLD-MCNC: 16.3 G/DL (ref 13–17.7)
MAGNESIUM SERPL-MCNC: 2.1 MG/DL (ref 1.6–2.4)
MCH RBC QN AUTO: 32.3 PG (ref 26.6–33)
MCHC RBC AUTO-ENTMCNC: 34.1 G/DL (ref 31.5–35.7)
MCV RBC AUTO: 94.8 FL (ref 79–97)
PLATELET # BLD AUTO: 251 10*3/MM3 (ref 140–450)
PMV BLD AUTO: 9.1 FL (ref 6–12)
POTASSIUM SERPL-SCNC: 5.1 MMOL/L (ref 3.5–5.2)
RBC # BLD AUTO: 5.04 10*6/MM3 (ref 4.14–5.8)
SODIUM SERPL-SCNC: 138 MMOL/L (ref 136–145)
TSH SERPL DL<=0.05 MIU/L-ACNC: 3.63 UIU/ML (ref 0.27–4.2)
WBC NRBC COR # BLD: 6.2 10*3/MM3 (ref 3.4–10.8)

## 2023-01-03 PROCEDURE — 71275 CT ANGIOGRAPHY CHEST: CPT

## 2023-01-03 PROCEDURE — 84443 ASSAY THYROID STIM HORMONE: CPT

## 2023-01-03 PROCEDURE — 85027 COMPLETE CBC AUTOMATED: CPT

## 2023-01-03 PROCEDURE — 83735 ASSAY OF MAGNESIUM: CPT

## 2023-01-03 PROCEDURE — 36415 COLL VENOUS BLD VENIPUNCTURE: CPT

## 2023-01-03 PROCEDURE — 80048 BASIC METABOLIC PNL TOTAL CA: CPT

## 2023-01-03 PROCEDURE — 0 IOPAMIDOL PER 1 ML: Performed by: INTERNAL MEDICINE

## 2023-01-03 RX ADMIN — IOPAMIDOL 80 ML: 755 INJECTION, SOLUTION INTRAVENOUS at 10:53

## 2023-01-03 NOTE — PAT
Pt reports he was given hydration instructions from Dr Ruvalcaba's office. Also instructed not to hold any medication. I encouraged him to review those instructions and follow as written .     Patient denies any current skin issues.     Pt directed to report to Radiology after PAT appt for CT scan .

## 2023-01-03 NOTE — DISCHARGE INSTRUCTIONS
Pt reported he was given instructions from the office for hydration protocol. I encouraged him to review and follow those instructions     Dear Patient,  Take your medications as instructed by your doctor.    Glasses and jewelry may be worn, but dentures must be removed prior to your procedure.    Leave any items you consider valuable at home.      MORNING of your Procedure, please bring the following:     -Photo ID and insurance card(s)    -ALL medications in their ORIGINAL CONTAINERS    -Co-pay and/or deductible required by your insurance   -Copy of living will or power of  document (if not brought to    Pre-Admission Testing department)   -CPAP mask and tubing, not your machine (if applicable)    -Relaxation aids (music, books, magazines)   -Skin Prep Instruction Sheet (if applicable)   -Relaxation Aids    Check in on the 2nd floor in the [address] building.  Your procedure will be performed in the cath lab or EP lab.  During your procedure, your family will wait in the cath lab waiting area where you checked in.      Need to make arrangements for transportation prior to discharge.    A handout regarding \"Heart Healthy Eating\" was provided today to encourage healthy eating habits.    Please note:  If you are scheduled to have one of the following procedures: Pulmonary Vein Ablation, Lead Extraction, MitraClip, Cerebral Coilings or Embolization, please let your family know that after your procedure you will be going to recovery unit on the 2nd floor of the [address] building.  When the physician is finished speaking with your family after your procedure is completed, your family will be directed or escorted to the surgery waiting area in the [address] building.  This is where your family will wait until you are given a room assignment and then your family will be directed to the appropriate unit.

## 2023-01-04 ENCOUNTER — HOSPITAL ENCOUNTER (OUTPATIENT)
Facility: HOSPITAL | Age: 78
Discharge: HOME | End: 2023-01-04
Attending: INTERNAL MEDICINE | Admitting: INTERNAL MEDICINE
Payer: COMMERCIAL

## 2023-01-04 ENCOUNTER — ANESTHESIA (OUTPATIENT)
Dept: CARDIOLOGY | Facility: HOSPITAL | Age: 78
End: 2023-01-04
Payer: COMMERCIAL

## 2023-01-04 VITALS
OXYGEN SATURATION: 91 % | TEMPERATURE: 97 F | WEIGHT: 233.2 LBS | RESPIRATION RATE: 16 BRPM | HEIGHT: 76 IN | BODY MASS INDEX: 28.4 KG/M2 | SYSTOLIC BLOOD PRESSURE: 126 MMHG | HEART RATE: 65 BPM | DIASTOLIC BLOOD PRESSURE: 95 MMHG

## 2023-01-04 DIAGNOSIS — I48.0 PAROXYSMAL ATRIAL FIBRILLATION: ICD-10-CM

## 2023-01-04 DIAGNOSIS — I48.19 PERSISTENT ATRIAL FIBRILLATION: ICD-10-CM

## 2023-01-04 LAB
ACT BLD: 335 SECONDS (ref 82–152)
ACT BLD: 341 SECONDS (ref 82–152)
ACT BLD: 347 SECONDS (ref 82–152)
ACT BLD: 366 SECONDS (ref 82–152)

## 2023-01-04 PROCEDURE — C1732 CATH, EP, DIAG/ABL, 3D/VECT: HCPCS | Performed by: INTERNAL MEDICINE

## 2023-01-04 PROCEDURE — C1894 INTRO/SHEATH, NON-LASER: HCPCS | Performed by: INTERNAL MEDICINE

## 2023-01-04 PROCEDURE — 93655 ICAR CATH ABLTJ DSCRT ARRHYT: CPT | Performed by: INTERNAL MEDICINE

## 2023-01-04 PROCEDURE — 93005 ELECTROCARDIOGRAM TRACING: CPT | Performed by: INTERNAL MEDICINE

## 2023-01-04 PROCEDURE — 93623 PRGRMD STIMJ&PACG IV RX NFS: CPT | Performed by: INTERNAL MEDICINE

## 2023-01-04 PROCEDURE — 25010000002 ADENOSINE PER 6 MG: Performed by: INTERNAL MEDICINE

## 2023-01-04 PROCEDURE — 93657 TX L/R ATRIAL FIB ADDL: CPT | Performed by: INTERNAL MEDICINE

## 2023-01-04 PROCEDURE — 25010000002 DEXAMETHASONE PER 1 MG: Performed by: NURSE ANESTHETIST, CERTIFIED REGISTERED

## 2023-01-04 PROCEDURE — C1759 CATH, INTRA ECHOCARDIOGRAPHY: HCPCS | Performed by: INTERNAL MEDICINE

## 2023-01-04 PROCEDURE — 93010 ELECTROCARDIOGRAM REPORT: CPT | Performed by: INTERNAL MEDICINE

## 2023-01-04 PROCEDURE — S0260 H&P FOR SURGERY: HCPCS | Performed by: INTERNAL MEDICINE

## 2023-01-04 PROCEDURE — 93622 COMP EP EVAL L VENTR PAC&REC: CPT | Performed by: INTERNAL MEDICINE

## 2023-01-04 PROCEDURE — 25010000002 NEOSTIGMINE 10 MG/10ML SOLUTION: Performed by: NURSE ANESTHETIST, CERTIFIED REGISTERED

## 2023-01-04 PROCEDURE — 25010000002 PHENYLEPHRINE 10 MG/ML SOLUTION 1 ML VIAL: Performed by: NURSE ANESTHETIST, CERTIFIED REGISTERED

## 2023-01-04 PROCEDURE — 85347 COAGULATION TIME ACTIVATED: CPT

## 2023-01-04 PROCEDURE — 93656 COMPRE EP EVAL ABLTJ ATR FIB: CPT | Performed by: INTERNAL MEDICINE

## 2023-01-04 PROCEDURE — C1760 CLOSURE DEV, VASC: HCPCS | Performed by: INTERNAL MEDICINE

## 2023-01-04 PROCEDURE — C1769 GUIDE WIRE: HCPCS | Performed by: INTERNAL MEDICINE

## 2023-01-04 PROCEDURE — 25010000002 PROTAMINE SULFATE PER 10 MG: Performed by: INTERNAL MEDICINE

## 2023-01-04 PROCEDURE — C1893 INTRO/SHEATH, FIXED,NON-PEEL: HCPCS | Performed by: INTERNAL MEDICINE

## 2023-01-04 PROCEDURE — C1766 INTRO/SHEATH,STRBLE,NON-PEEL: HCPCS | Performed by: INTERNAL MEDICINE

## 2023-01-04 PROCEDURE — 25010000002 FUROSEMIDE PER 20 MG: Performed by: NURSE ANESTHETIST, CERTIFIED REGISTERED

## 2023-01-04 PROCEDURE — 25010000002 ATROPINE PER 0.01 MG: Performed by: NURSE ANESTHETIST, CERTIFIED REGISTERED

## 2023-01-04 PROCEDURE — 25010000002 HEPARIN (PORCINE) PER 1000 UNITS: Performed by: INTERNAL MEDICINE

## 2023-01-04 PROCEDURE — C1730 CATH, EP, 19 OR FEW ELECT: HCPCS | Performed by: INTERNAL MEDICINE

## 2023-01-04 PROCEDURE — 25010000002 ONDANSETRON PER 1 MG: Performed by: NURSE ANESTHETIST, CERTIFIED REGISTERED

## 2023-01-04 PROCEDURE — 25010000002 PHENYLEPHRINE 10 MG/ML SOLUTION: Performed by: NURSE ANESTHETIST, CERTIFIED REGISTERED

## 2023-01-04 PROCEDURE — 25010000002 PROPOFOL 10 MG/ML EMULSION: Performed by: NURSE ANESTHETIST, CERTIFIED REGISTERED

## 2023-01-04 RX ORDER — ONDANSETRON 2 MG/ML
4 INJECTION INTRAMUSCULAR; INTRAVENOUS EVERY 6 HOURS PRN
Status: DISCONTINUED | OUTPATIENT
Start: 2023-01-04 | End: 2023-01-04 | Stop reason: HOSPADM

## 2023-01-04 RX ORDER — DROPERIDOL 2.5 MG/ML
0.62 INJECTION, SOLUTION INTRAMUSCULAR; INTRAVENOUS ONCE AS NEEDED
Status: DISCONTINUED | OUTPATIENT
Start: 2023-01-04 | End: 2023-01-04 | Stop reason: HOSPADM

## 2023-01-04 RX ORDER — GLYCOPYRROLATE 0.2 MG/ML
0.1 INJECTION INTRAMUSCULAR; INTRAVENOUS ONCE
Status: COMPLETED | OUTPATIENT
Start: 2023-01-04 | End: 2023-01-04

## 2023-01-04 RX ORDER — LIDOCAINE HYDROCHLORIDE 10 MG/ML
INJECTION, SOLUTION EPIDURAL; INFILTRATION; INTRACAUDAL; PERINEURAL AS NEEDED
Status: DISCONTINUED | OUTPATIENT
Start: 2023-01-04 | End: 2023-01-04 | Stop reason: SURG

## 2023-01-04 RX ORDER — HYDRALAZINE HYDROCHLORIDE 20 MG/ML
5 INJECTION INTRAMUSCULAR; INTRAVENOUS
Status: DISCONTINUED | OUTPATIENT
Start: 2023-01-04 | End: 2023-01-04 | Stop reason: HOSPADM

## 2023-01-04 RX ORDER — IPRATROPIUM BROMIDE AND ALBUTEROL SULFATE 2.5; .5 MG/3ML; MG/3ML
3 SOLUTION RESPIRATORY (INHALATION) ONCE AS NEEDED
Status: DISCONTINUED | OUTPATIENT
Start: 2023-01-04 | End: 2023-01-04 | Stop reason: HOSPADM

## 2023-01-04 RX ORDER — SODIUM CHLORIDE 9 MG/ML
40 INJECTION, SOLUTION INTRAVENOUS AS NEEDED
Status: DISCONTINUED | OUTPATIENT
Start: 2023-01-04 | End: 2023-01-04 | Stop reason: HOSPADM

## 2023-01-04 RX ORDER — PHENYLEPHRINE HYDROCHLORIDE 10 MG/ML
INJECTION INTRAVENOUS AS NEEDED
Status: DISCONTINUED | OUTPATIENT
Start: 2023-01-04 | End: 2023-01-04 | Stop reason: SURG

## 2023-01-04 RX ORDER — SODIUM CHLORIDE 0.9 % (FLUSH) 0.9 %
3 SYRINGE (ML) INJECTION EVERY 12 HOURS SCHEDULED
Status: DISCONTINUED | OUTPATIENT
Start: 2023-01-04 | End: 2023-01-04 | Stop reason: HOSPADM

## 2023-01-04 RX ORDER — ATROPINE SULFATE 0.4 MG/ML
AMPUL (ML) INJECTION AS NEEDED
Status: DISCONTINUED | OUTPATIENT
Start: 2023-01-04 | End: 2023-01-04 | Stop reason: SURG

## 2023-01-04 RX ORDER — GLYCOPYRROLATE 0.2 MG/ML
INJECTION INTRAMUSCULAR; INTRAVENOUS AS NEEDED
Status: DISCONTINUED | OUTPATIENT
Start: 2023-01-04 | End: 2023-01-04 | Stop reason: SURG

## 2023-01-04 RX ORDER — MEPERIDINE HYDROCHLORIDE 25 MG/ML
12.5 INJECTION INTRAMUSCULAR; INTRAVENOUS; SUBCUTANEOUS
Status: DISCONTINUED | OUTPATIENT
Start: 2023-01-04 | End: 2023-01-04 | Stop reason: HOSPADM

## 2023-01-04 RX ORDER — MIDAZOLAM HYDROCHLORIDE 1 MG/ML
0.5 INJECTION INTRAMUSCULAR; INTRAVENOUS
Status: DISCONTINUED | OUTPATIENT
Start: 2023-01-04 | End: 2023-01-04 | Stop reason: HOSPADM

## 2023-01-04 RX ORDER — FUROSEMIDE 10 MG/ML
INJECTION INTRAMUSCULAR; INTRAVENOUS AS NEEDED
Status: DISCONTINUED | OUTPATIENT
Start: 2023-01-04 | End: 2023-01-04 | Stop reason: SURG

## 2023-01-04 RX ORDER — ONDANSETRON 2 MG/ML
INJECTION INTRAMUSCULAR; INTRAVENOUS AS NEEDED
Status: DISCONTINUED | OUTPATIENT
Start: 2023-01-04 | End: 2023-01-04 | Stop reason: SURG

## 2023-01-04 RX ORDER — LABETALOL HYDROCHLORIDE 5 MG/ML
5 INJECTION, SOLUTION INTRAVENOUS
Status: DISCONTINUED | OUTPATIENT
Start: 2023-01-04 | End: 2023-01-04 | Stop reason: HOSPADM

## 2023-01-04 RX ORDER — NALOXONE HCL 0.4 MG/ML
0.4 VIAL (ML) INJECTION AS NEEDED
Status: DISCONTINUED | OUTPATIENT
Start: 2023-01-04 | End: 2023-01-04 | Stop reason: HOSPADM

## 2023-01-04 RX ORDER — ADENOSINE 3 MG/ML
INJECTION, SOLUTION INTRAVENOUS
Status: DISCONTINUED | OUTPATIENT
Start: 2023-01-04 | End: 2023-01-04 | Stop reason: HOSPADM

## 2023-01-04 RX ORDER — PROMETHAZINE HYDROCHLORIDE 25 MG/1
25 SUPPOSITORY RECTAL ONCE AS NEEDED
Status: DISCONTINUED | OUTPATIENT
Start: 2023-01-04 | End: 2023-01-04 | Stop reason: HOSPADM

## 2023-01-04 RX ORDER — FAMOTIDINE 10 MG/ML
20 INJECTION, SOLUTION INTRAVENOUS ONCE
Status: DISCONTINUED | OUTPATIENT
Start: 2023-01-04 | End: 2023-01-04

## 2023-01-04 RX ORDER — ROCURONIUM BROMIDE 10 MG/ML
INJECTION, SOLUTION INTRAVENOUS AS NEEDED
Status: DISCONTINUED | OUTPATIENT
Start: 2023-01-04 | End: 2023-01-04 | Stop reason: SURG

## 2023-01-04 RX ORDER — HEPARIN SODIUM 1000 [USP'U]/ML
INJECTION, SOLUTION INTRAVENOUS; SUBCUTANEOUS
Status: DISCONTINUED | OUTPATIENT
Start: 2023-01-04 | End: 2023-01-04 | Stop reason: HOSPADM

## 2023-01-04 RX ORDER — SODIUM CHLORIDE 0.9 % (FLUSH) 0.9 %
10 SYRINGE (ML) INJECTION AS NEEDED
Status: DISCONTINUED | OUTPATIENT
Start: 2023-01-04 | End: 2023-01-04 | Stop reason: HOSPADM

## 2023-01-04 RX ORDER — ONDANSETRON 2 MG/ML
4 INJECTION INTRAMUSCULAR; INTRAVENOUS ONCE AS NEEDED
Status: DISCONTINUED | OUTPATIENT
Start: 2023-01-04 | End: 2023-01-04 | Stop reason: HOSPADM

## 2023-01-04 RX ORDER — HYDROCODONE BITARTRATE AND ACETAMINOPHEN 5; 325 MG/1; MG/1
1 TABLET ORAL ONCE AS NEEDED
Status: DISCONTINUED | OUTPATIENT
Start: 2023-01-04 | End: 2023-01-04 | Stop reason: HOSPADM

## 2023-01-04 RX ORDER — SODIUM CHLORIDE 0.9 % (FLUSH) 0.9 %
10 SYRINGE (ML) INJECTION EVERY 12 HOURS SCHEDULED
Status: DISCONTINUED | OUTPATIENT
Start: 2023-01-04 | End: 2023-01-04 | Stop reason: HOSPADM

## 2023-01-04 RX ORDER — PROTAMINE SULFATE 10 MG/ML
INJECTION, SOLUTION INTRAVENOUS
Status: DISCONTINUED | OUTPATIENT
Start: 2023-01-04 | End: 2023-01-04 | Stop reason: HOSPADM

## 2023-01-04 RX ORDER — NEOSTIGMINE METHYLSULFATE 1 MG/ML
INJECTION, SOLUTION INTRAVENOUS AS NEEDED
Status: DISCONTINUED | OUTPATIENT
Start: 2023-01-04 | End: 2023-01-04 | Stop reason: SURG

## 2023-01-04 RX ORDER — NITROGLYCERIN 0.4 MG/1
0.4 TABLET SUBLINGUAL
Status: DISCONTINUED | OUTPATIENT
Start: 2023-01-04 | End: 2023-01-04 | Stop reason: HOSPADM

## 2023-01-04 RX ORDER — PROPOFOL 10 MG/ML
VIAL (ML) INTRAVENOUS AS NEEDED
Status: DISCONTINUED | OUTPATIENT
Start: 2023-01-04 | End: 2023-01-04 | Stop reason: SURG

## 2023-01-04 RX ORDER — FAMOTIDINE 20 MG/1
20 TABLET, FILM COATED ORAL ONCE
Status: COMPLETED | OUTPATIENT
Start: 2023-01-04 | End: 2023-01-04

## 2023-01-04 RX ORDER — DROPERIDOL 2.5 MG/ML
0.62 INJECTION, SOLUTION INTRAMUSCULAR; INTRAVENOUS
Status: DISCONTINUED | OUTPATIENT
Start: 2023-01-04 | End: 2023-01-04 | Stop reason: HOSPADM

## 2023-01-04 RX ORDER — SODIUM CHLORIDE, SODIUM LACTATE, POTASSIUM CHLORIDE, CALCIUM CHLORIDE 600; 310; 30; 20 MG/100ML; MG/100ML; MG/100ML; MG/100ML
9 INJECTION, SOLUTION INTRAVENOUS CONTINUOUS
Status: DISCONTINUED | OUTPATIENT
Start: 2023-01-04 | End: 2023-01-04 | Stop reason: HOSPADM

## 2023-01-04 RX ORDER — DEXAMETHASONE SODIUM PHOSPHATE 4 MG/ML
INJECTION, SOLUTION INTRA-ARTICULAR; INTRALESIONAL; INTRAMUSCULAR; INTRAVENOUS; SOFT TISSUE AS NEEDED
Status: DISCONTINUED | OUTPATIENT
Start: 2023-01-04 | End: 2023-01-04 | Stop reason: SURG

## 2023-01-04 RX ORDER — SODIUM CHLORIDE 9 MG/ML
INJECTION, SOLUTION INTRAVENOUS
Status: COMPLETED | OUTPATIENT
Start: 2023-01-04 | End: 2023-01-04

## 2023-01-04 RX ORDER — HYDROMORPHONE HYDROCHLORIDE 1 MG/ML
0.5 INJECTION, SOLUTION INTRAMUSCULAR; INTRAVENOUS; SUBCUTANEOUS
Status: DISCONTINUED | OUTPATIENT
Start: 2023-01-04 | End: 2023-01-04 | Stop reason: HOSPADM

## 2023-01-04 RX ORDER — FENTANYL CITRATE 50 UG/ML
50 INJECTION, SOLUTION INTRAMUSCULAR; INTRAVENOUS
Status: DISCONTINUED | OUTPATIENT
Start: 2023-01-04 | End: 2023-01-04 | Stop reason: HOSPADM

## 2023-01-04 RX ORDER — SODIUM CHLORIDE 0.9 % (FLUSH) 0.9 %
3-10 SYRINGE (ML) INJECTION AS NEEDED
Status: DISCONTINUED | OUTPATIENT
Start: 2023-01-04 | End: 2023-01-04 | Stop reason: HOSPADM

## 2023-01-04 RX ORDER — HEPARIN SODIUM 10000 [USP'U]/100ML
INJECTION, SOLUTION INTRAVENOUS
Status: DISCONTINUED | OUTPATIENT
Start: 2023-01-04 | End: 2023-01-04 | Stop reason: HOSPADM

## 2023-01-04 RX ORDER — ACETAMINOPHEN 325 MG/1
650 TABLET ORAL EVERY 4 HOURS PRN
Status: DISCONTINUED | OUTPATIENT
Start: 2023-01-04 | End: 2023-01-04 | Stop reason: HOSPADM

## 2023-01-04 RX ORDER — PROMETHAZINE HYDROCHLORIDE 25 MG/1
25 TABLET ORAL ONCE AS NEEDED
Status: DISCONTINUED | OUTPATIENT
Start: 2023-01-04 | End: 2023-01-04 | Stop reason: HOSPADM

## 2023-01-04 RX ORDER — LIDOCAINE HYDROCHLORIDE 10 MG/ML
0.5 INJECTION, SOLUTION EPIDURAL; INFILTRATION; INTRACAUDAL; PERINEURAL ONCE AS NEEDED
Status: DISCONTINUED | OUTPATIENT
Start: 2023-01-04 | End: 2023-01-04 | Stop reason: HOSPADM

## 2023-01-04 RX ADMIN — DEXAMETHASONE SODIUM PHOSPHATE 8 MG: 4 INJECTION, SOLUTION INTRAMUSCULAR; INTRAVENOUS at 11:03

## 2023-01-04 RX ADMIN — GLYCOPYRROLATE 0.4 MCG: 0.2 INJECTION INTRAMUSCULAR; INTRAVENOUS at 13:02

## 2023-01-04 RX ADMIN — PHENYLEPHRINE HYDROCHLORIDE 80 MCG: 10 INJECTION INTRAVENOUS at 12:50

## 2023-01-04 RX ADMIN — FUROSEMIDE 60 MG: 10 INJECTION, SOLUTION INTRAMUSCULAR; INTRAVENOUS at 13:11

## 2023-01-04 RX ADMIN — PHENYLEPHRINE HYDROCHLORIDE 160 MCG: 10 INJECTION INTRAVENOUS at 10:47

## 2023-01-04 RX ADMIN — ATROPINE SULFATE 0.5 MG: 0.4 INJECTION, SOLUTION INTRAMUSCULAR; INTRAVENOUS; SUBCUTANEOUS at 12:59

## 2023-01-04 RX ADMIN — ROCURONIUM BROMIDE 10 MG: 10 INJECTION, SOLUTION INTRAVENOUS at 12:26

## 2023-01-04 RX ADMIN — PROPOFOL 200 MG: 10 INJECTION, EMULSION INTRAVENOUS at 10:17

## 2023-01-04 RX ADMIN — PHENYLEPHRINE HYDROCHLORIDE 0.1 MCG/KG/MIN: 10 INJECTION INTRAVENOUS at 10:44

## 2023-01-04 RX ADMIN — ROCURONIUM BROMIDE 10 MG: 10 INJECTION, SOLUTION INTRAVENOUS at 12:06

## 2023-01-04 RX ADMIN — PHENYLEPHRINE HYDROCHLORIDE 80 MCG: 10 INJECTION INTRAVENOUS at 10:43

## 2023-01-04 RX ADMIN — LIDOCAINE HYDROCHLORIDE 50 MG: 10 INJECTION, SOLUTION EPIDURAL; INFILTRATION; INTRACAUDAL; PERINEURAL at 10:17

## 2023-01-04 RX ADMIN — NEOSTIGMINE METHYLSULFATE 4 MG: 0.5 INJECTION INTRAVENOUS at 13:02

## 2023-01-04 RX ADMIN — FAMOTIDINE 20 MG: 20 TABLET, FILM COATED ORAL at 08:27

## 2023-01-04 RX ADMIN — ROCURONIUM BROMIDE 40 MG: 10 INJECTION, SOLUTION INTRAVENOUS at 10:18

## 2023-01-04 RX ADMIN — GLYCOPYRROLATE 0.1 MG: 0.2 INJECTION INTRAMUSCULAR; INTRAVENOUS at 13:45

## 2023-01-04 RX ADMIN — ONDANSETRON 4 MG: 2 INJECTION INTRAMUSCULAR; INTRAVENOUS at 12:54

## 2023-01-04 NOTE — Clinical Note
Hemostasis started on the right femoral vein. Vascade MVP was used in achieving hemostasis. Closure device deployed in the vessel. Hemostasis achieved successfully.

## 2023-01-04 NOTE — INTERVAL H&P NOTE
H&P reviewed. The patient was examined and there are no changes to the H&P.       EP Pre-Procedure Report  Cardiovascular Laboratory  [facility]    Patient:  Shaun Tyler  :  1945  PCP:  Alejandro Richard PA-C  PHONE:  [phone]    DATE: 2023      MEDICATIONS:  Prior to Admission medications    Medication Sig Start Date End Date Taking? Authorizing Provider   Eliquis 5 MG tablet tablet TAKE ONE TABLET BY MOUTH TWICE A DAY  Patient taking differently: Take 5 mg by mouth Every 12 (Twelve) Hours. 22  Yes Shaun Parker MD   ferrous sulfate 325 (65 FE) MG tablet Take 325 mg by mouth 1 (One) Time Per Week. sundays   Yes Thuan Castellanos MD   fluticasone (FLONASE) 50 MCG/ACT nasal spray 2 sprays into the nostril(s) as directed by provider As Needed for Rhinitis or Allergies.   Yes Thuan Castellanos MD   lansoprazole (PREVACID) 30 MG capsule Take 30 mg by mouth Daily.   Yes Thuan Castellanos MD   metoprolol tartrate (LOPRESSOR) 25 MG tablet Take 1 tablet by mouth 2 (Two) Times a Day. 22  Yes Thomas Issa MD   psyllium (METAMUCIL) 58.6 % packet Take 1 packet by mouth Daily.   Yes Thuan Castellanos MD       Past medical & surgical history, social and family history reviewed in the electronic medical record.    Physical Exam:    Vitals:   Vitals:    23 0730   BP: 120/95   Pulse: 71   Resp: 16   Temp: 98 °F (36.7 °C)   SpO2: 98%          23  0730   Weight: 106 kg (233 lb 3.2 oz)   Body mass index is 28.39 kg/m².    GENERAL: No apparent distress.  No significant changes since last exam.  CHEST: Clear to auscultation bilaterally no stridor no wheeze.  CV: S1, S2, Regular without Murmurs, Rubs or Gallops  EXTREMITIES: No edema.        Results from last 7 days   Lab Units 23  0954   SODIUM mmol/L 138   POTASSIUM mmol/L 5.1   CHLORIDE mmol/L 103   CO2 mmol/L 25.0   BUN mg/dL 16   CREATININE mg/dL 1.04   GLUCOSE mg/dL 92   CALCIUM  mg/dL 9.0     Results from last 7 days   Lab Units 01/03/23  0954   WBC 10*3/mm3 6.20   HEMOGLOBIN g/dL 16.3   HEMATOCRIT % 47.8   PLATELETS 10*3/mm3 251     Estimated Creatinine Clearance: 82 mL/min (by C-G formula based on SCr of 1.04 mg/dL).    IMPRESSION:Afib      PLAN:  Procedure to perform: Afib ablation        Electronically signed by CHAKA Jenkins, 01/04/23, 9:33 AM EST.

## 2023-01-04 NOTE — OP NOTE
Cardiac Electrophysiology Procedure Note           [facility]         CATHETER ABLATION FOR ATRIAL FIBRILLATION (PVI)    PROCEDURES PERFORMED:    · Catheter ablation of persistent atrial fibrillation  · Adjunctive ablation of the left atrial roof for persistent atrial fibrillation  · Adjunctive ablation of the left atrial inferior wall for persistent atrial relation  · Catheter ablation of SVT #1 focal atrial tachycardia from the base left atrial appendage  · Catheter ablation of SVT #2 focal atrial tachycardia from the left atrial posterior wall  · Full diagnostic EP study  · 3D electroanatomic mapping  · drug infusion / programmed pacing  · Intracadiac Echocardiography  · Double transeptal puncture  · Left ventricular pacing and recording    PREPROCEDURAL DIAGNOSES:    1.  Persistent atrial fibrillation  2. [id number] score of 3  3.  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    INDICATION FOR PROCEDURE:  Briefly, Shaun Tyler is a 77 y.o. year old male with a history of highly symptomatic recurrent persistent atrial fibrillation prior remote history of percutaneous ASD closure with Amplatzer device who has failed medical therapy for rhythm control.  Presents today for elective catheter ablation of his left atrium for paroxysmal as well as now more recently persistent atrial fibrillation    ANTICOAGULATION STRATEGY PRIOR TO AND POST PROCEDURE: Apixaban 5 mg orally twice daily.  The last dose of anticoagulant was confirmed to have been taken this morning.      PT/INR:  No results found for: LABPROT, INR  PTT:  No results found for: APTT    CBC:   WBC   Date Value Ref Range Status   01/03/2023 6.20 3.40 - 10.80 10*3/mm3 Final     RBC   Date Value Ref Range Status   01/03/2023 5.04 4.14 - 5.80 10*6/mm3 Final     Hemoglobin   Date Value Ref Range Status   01/03/2023 16.3 13.0 - 17.7 g/dL Final      Hematocrit   Date Value Ref Range Status   01/03/2023 47.8 37.5 - 51.0 % Final     MCV   Date Value Ref Range Status   01/03/2023 94.8 79.0 - 97.0 fL Final     RDW   Date Value Ref Range Status   01/03/2023 13.0 12.3 - 15.4 % Final     Platelets   Date Value Ref Range Status   01/03/2023 251 140 - 450 10*3/mm3 Final     BMP:     Sodium   Date Value Ref Range Status   01/03/2023 138 136 - 145 mmol/L Final     Potassium   Date Value Ref Range Status   01/03/2023 5.1 3.5 - 5.2 mmol/L Final     Comment:     Slight hemolysis detected by analyzer. Results may be affected.     Chloride   Date Value Ref Range Status   01/03/2023 103 98 - 107 mmol/L Final     CO2   Date Value Ref Range Status   01/03/2023 25.0 22.0 - 29.0 mmol/L Final     BUN   Date Value Ref Range Status   01/03/2023 16 8 - 23 mg/dL Final     Creatinine   Date Value Ref Range Status   01/03/2023 1.04 0.76 - 1.27 mg/dL Final     eGFR   Date Value Ref Range Status   01/03/2023 74.9 >60.0 mL/min/1.73 Final     Comment:     National Kidney Foundation and American Society of Nephrology (ASN) Task Force recommended calculation based on the Chronic Kidney Disease Epidemiology Collaboration (CKD-EPI) equation refit without adjustment for race.       Vital Signs: /69   Pulse 54   Temp 97 °F (36.1 °C)   Resp 16   Ht 193 cm (76\")   Wt 106 kg (233 lb 3.2 oz)   SpO2 97%   BMI 28.39 kg/m²      Admit Weight:  106 kg (233 lb 3.2 oz)  BMI: Body mass index is 28.39 kg/m².    PROCEDURE NARRATIVE:    Preoperatively we carefully reviewed the patient's transesophageal echocardiogram and preoperative CT scan.  A very large ASD occluder device is noted.  Upon careful review I felt that there was a area superior and posterior to the ASD occluder where there was intact interatrial septum amenable to transseptal puncture.  We elected to proceed.    The patient was able to give written informed consent after revisiting the key portions of the risk versus benefit  profile of the procedure.  This discussion was framed by our lengthy conversations  (please see our detailed notes).  Patient verbalized strong understanding of this discussion and a strong desire to proceed with the procedure.  Please note that this detailed informed consent process utilized mutual and shared decision making process between all parties involved, principally the physician and patient, but also potentially with input from the patient's selected family and friends.    The patient was brought to the EP laboratory in the post absorptive state.  The patient was electively intubated for the procedure and given a general anesthetic by colleagues from anesthesia.   An marisela-gastric tube was placed by anesthesia staff.  A radial artery catheter was placed by anesthesia staff for continuous blood pressure monitoring.  Please see the detailed anesthesia records.    The patient was then prepared and draped in a routing sterile fashion.  Seldinger access was obtained at the bilateral common femoral veins with 5 venipunctures.  J tip wires were advanced into the vascular space.  Short 11,8,6 Mongolian sheaths,  and a deflectable sheath were placed into the bilateral common femoral veins and the inferior vena cava / right atrium in an over the wire fashion.    The patient was anticoagulated with intravenous heparin (initial bolus and then continuous infusion) with a goal ACT of between 350 and 400 seconds.  A phased array ICE catheter was placed into the right atrium and right ventricle.  This was used for transeptal puncture, monitoring of the pericardial space, monitoring of the ablation catheter position within the heart and monitoring of other cardiac structures such as the left atrial appendage (to document the absence of thrombus), pulmonary veins, esophagus, mitral valve annulus and other cardiac structures.    A single transeptal puncture was performed after heparin administration with a combination of  echocardiographic and fluoroscopic guidance.  The transseptal puncture was performed as we had planned at the superior and posterior aspect of the ASD occluder.  This was performed with the long sheath, a BRK needle, and a SafeSept transeptal wire.  Catheters and sheaths were advanced safely into the left atrium.  A 64 electrode 6Scan diagnostic electrophysiology catheter and a [employer] irrigated tip ablation catheter were used for pacing and recording in the left atrium, left atrial appendage, pulmonary veins and left ventricle at various times throughout the procedure.  We used the [employer] 3D electroanatomic mapping system in conjunction with these catheters to construct an electroanatomic shell of the left atrium, left atrial appendage, mitral valve annulus, interatrial septum and pulmonary veins.  Left ventricular pacing and recording were performed by placing catheters safely and carefully across the mitral valve annulus to the left ventricle from the left atrium.  Adequate sensing and pacing thresholds were obtained from the left ventricle.  AV conduction ( antegrade ) as well as VA conduction ( retrograde ) were studied.  This was performed as a distinct addition to the diagnostic EP study.  Findings were conclusive for no accessory pathway and VA dissociation.    An EP study was performed.  Data obtained from this is listed in the below table:    Initial Study    Isuprel Washout Study      LA   179     drive train / burst extra    QRS 93    Rhythm          Atrial CL      R-R 1108    Ventricular CL          AVBCL      HV 54    AVNERP       drive train / burst extrastim   Slow Pway ERP      Rhythm     Fast Pway ERP      Atrial CL     VABCL conc? /  Dec?      Ventricular CL     VAERP      AVBCL >600    AERP      AVNERP 800 450   VERP      Slow Pway ERP     AP antegrade ERP      Fast Pway ERP     AP retrograde ERP      VABCL conc? /  Dec?           VAERP    Final Study      AERP      drive  train / burst extrastim    VERP     Rhythm      AP antegrade ERP     Atrial CL      AP retrograde ERP     Ventricular CL           AVBCL     Isuprel Dose =      AVNERP       drive train / burst extrastim   Slow Pway ERP      Rhythm     Fast Pway ERP      Atrial CL     VABCL conc? /  Dec?      Ventricular CL     VAERP      AVBCL     AERP      AVNERP     VERP      Slow Pway ERP     AP antegrade ERP      Fast Pway ERP     AP retrograde ERP      VABCL conc? /  Dec?           VAERP           AERP           VERP           AP antegrade ERP           AP retrograde ERP                       Catheter ablation was performed to achieve pulmonary vein isolation.  A wide antral circumferential ablation approach was used.  Power was limited to 50 W on the posterior left atrium and 50 W elsewhere.  Lesions were made using proprietary local impedance technology target impedance drop of 15 to 20 ohms on the posterior wall and greater than 20 ohms elsewhere.   Additional lesions were given within the antral lesion set at the jean between superior and inferior veins to achieve total isolation, when and where necessary.      The patient remained in atrial fibrillation.  Adjunctive ablation was performed to achieve isolation of the left atrial roof.  All voltage in this area was obliterated.  The line connecting the right superior pulmonary vein the left superior pulmonary vein.    Patient remained in a fibrillation.  Additional adjunctive ablation was performed to achieve isolation of left atrial floor.  All voltage in this area was obliterated.  The line connecting the left inferior pulmonary vein and right inferior pulmonary vein.      Organized atrial tachycardia ensued.  This was mapped and located at the base left atrial appendage.  Extensive ablation in this area was performed.  Power was 50 W.  This referred to his SVT #1.    A second atrial tachycardia ensued.  This is a distinct mechanism and is referred to his SVT #2.  This  was mapped to the left atrial posterior wall.  There was clear activation despite our previously empiric lines.  This is a focal arrhythmia.  Extensive ablation of the entire left atrial posterior wall was performed.  The entire left atrial posterior wall was isolated.    Esophageal attune protection device was employed throughout.    After completing the antral ablation lesion set, I interrogated the pulmonary veins using and documented pulmonary vein isolation.  Additionally we also documented total isolation of the entire left atrial posterior wall.    Adenosine 12 mg was administered intravenously following ablation to test for other atrial and or ventricular arrhythmias.   Programmed stimulation was performed in an attempt to induce other and additional arrhythmias.  No arrhythmias were induced during administration and washout.    The ICE catheter revealed that there was no pericardial effusion.    Catheters and sheaths were then removed from the body.    Hemostasis was achieved with Vascade closure devices x 4.      The patient was extubated in routine fashion and transferred to PACU in stable condition.    COMPLICATIONS: None    EBL: minimal    RADIATION EXPOSURE: 28 mGy over 6.2 minutes    KEY PROCEDURAL FINDINGS:  · Normal AV node and His-Purkinje system function  · Scant left atrial myopathy  · Successful transseptal puncture superior and posterior to ASD occluder  · Right antral circumferential pulmonary vein isolation  · Extensive ablation involving wide antral circumferential pulmonary vein isolation left atrial roof left atrial floor entire left atrial posterior wall and base of left atrial appendage    POST PROCEDURAL PLAN:    ·  Report was called the the PACU nurse responsible for the patients care.  · Uninterrupted anticoagulation for not less than 90 days unless specially instructed otherwise by myself or another member of our EP physician team.  Please note that the patient and the patient’s  family have been extensively counseled about this critical requirement and have agreed to comply.  · Esophageal prophylaxis with proton pump inhibitor for 90 days.  · Anticipate discharge tomorrow.  · Medications were reconciled, and key changes in medications include: Stop metoprolol  · Patient and family instructed to call immediately for fever greater than 101.5 degrees F, hematemesis, increasing or severe chest pain, increasing shortness of breath, bleeding or other concerns.  Patient and family instructed that some chest discomfort is normal and is to be expected and that this should be expected to decrease over the first 3-4 days after the ablation procedure.  · The patient will be seen at our office per routine follow up.      Escobar Ruvalcaba DO, FAC, [facility]  Cardiac Electrophysiologist  [facility] / [facility]

## 2023-01-04 NOTE — ANESTHESIA PREPROCEDURE EVALUATION
Anesthesia Evaluation     Patient summary reviewed and Nursing notes reviewed   no history of anesthetic complications:  NPO Solid Status: > 8 hours  NPO Liquid Status: > 8 hours           Airway   Mallampati: II  TM distance: >3 FB  Neck ROM: full  No difficulty expected  Dental      Pulmonary - normal exam   (+) sleep apnea,   Cardiovascular - normal exam    (+) hypertension, dysrhythmias, hyperlipidemia,       Neuro/Psych  GI/Hepatic/Renal/Endo    (+)  hiatal hernia, GERD,      Musculoskeletal     Abdominal    Substance History      OB/GYN          Other                        Anesthesia Plan    ASA 3     general     intravenous induction     Anesthetic plan, risks, benefits, and alternatives have been provided, discussed and informed consent has been obtained with: patient.    Plan discussed with CRNA.        CODE STATUS:

## 2023-01-04 NOTE — ANESTHESIA POSTPROCEDURE EVALUATION
Patient: Shaun yTler    Procedure Summary     Date: 01/04/23 Room / Location: [facility] / [facility]    Anesthesia Start: 1001 Anesthesia Stop: 1334    Procedure: PVA (persistent), Rythmia poss STX, no meds to hold Diagnosis:       Persistent atrial fibrillation (HCC)      (AF)    Providers: Escobar Ruvalcaba DO Provider: Fede Peraza MD    Anesthesia Type: general ASA Status: 3          Anesthesia Type: general    Vitals  Vitals Value Taken Time   /69 01/04/23 1334   Temp 97 °F (36.1 °C) 01/04/23 1334   Pulse 54 01/04/23 1334   Resp 16 01/04/23 1334   SpO2 97 % 01/04/23 1334           Post Anesthesia Care and Evaluation    Patient location during evaluation: PACU  Patient participation: waiting for patient participation  Level of consciousness: sleepy but conscious    Airway patency: patent  Anesthetic complications: No anesthetic complications  PONV Status: none  Cardiovascular status: stable  Respiratory status: spontaneous ventilation and nasal cannula  Hydration status: acceptable  No anesthesia care post op

## 2023-01-04 NOTE — Clinical Note
Prepped: groin. Prepped with: ChloraPrep. The site was clipped. The patient was draped in a sterile fashion.

## 2023-01-04 NOTE — Clinical Note
Allergies reviewed.  H&P note has been confirmed for the patient. Procedural consent has been signed.  Staff has reviewed the patient's labs.  Labs have been reviewed and show abnormalities. Physician is aware of labs.

## 2023-01-05 ENCOUNTER — CALL CENTER PROGRAMS (OUTPATIENT)
Dept: CALL CENTER | Facility: HOSPITAL | Age: 78
End: 2023-01-05
Payer: COMMERCIAL

## 2023-01-05 NOTE — OUTREACH NOTE
PCI/Device Survey    Flowsheet Row Responses   Facility patient discharged from? [facility]   Procedure date 01/04/23   Procedure (if device, specify in description) Ablation  [Right and left groin]   Performing MD Dr. Escobar Ruvalcaba   Attempt successful? Yes   Call start time 0940   Call end time 0948   Person spoke with today (if not patient) and relationship patient   Has the patient had any of the following symptoms since discharge? Shortness of breath  [with climbing stairs]   Is the patient taking prescribed medications: None  [no changes to meds. ]   Nursing intervention Reminded to continue to take prescribed medications, Nurse provided patient education   Nursing intervention Patient education provided   Does the patient have an appointment scheduled with the cardiologist? No   Appointment comments awaiting call from office.   Did the patient feel prepared to go home on the same day as the procedure? Yes   Is the patient satisfied with the same day discharge process? Yes   PCI/Device call completed Yes          MARIANELA LENZ - Registered Nurse

## 2023-01-10 LAB
QT INTERVAL: 444 MS
QTC INTERVAL: 515 MS

## 2023-01-30 ENCOUNTER — HOSPITAL ENCOUNTER (OUTPATIENT)
Dept: CARDIOLOGY | Facility: HOSPITAL | Age: 78
Discharge: HOME | End: 2023-01-30
Payer: COMMERCIAL

## 2023-01-30 ENCOUNTER — TELEPHONE (OUTPATIENT)
Dept: CARDIOLOGY | Facility: CLINIC | Age: 78
End: 2023-01-30
Payer: COMMERCIAL

## 2023-01-30 ENCOUNTER — OFFICE VISIT (OUTPATIENT)
Dept: CARDIOLOGY | Facility: HOSPITAL | Age: 78
End: 2023-01-30
Payer: COMMERCIAL

## 2023-01-30 VITALS
WEIGHT: 229.13 LBS | TEMPERATURE: 96.9 F | HEART RATE: 91 BPM | RESPIRATION RATE: 20 BRPM | BODY MASS INDEX: 27.9 KG/M2 | SYSTOLIC BLOOD PRESSURE: 120 MMHG | DIASTOLIC BLOOD PRESSURE: 58 MMHG | OXYGEN SATURATION: 95 % | HEIGHT: 76 IN

## 2023-01-30 DIAGNOSIS — I10 ESSENTIAL HYPERTENSION: ICD-10-CM

## 2023-01-30 DIAGNOSIS — I48.19 PERSISTENT ATRIAL FIBRILLATION: ICD-10-CM

## 2023-01-30 DIAGNOSIS — I48.19 PERSISTENT ATRIAL FIBRILLATION: Primary | ICD-10-CM

## 2023-01-30 PROCEDURE — 93010 ELECTROCARDIOGRAM REPORT: CPT | Performed by: INTERNAL MEDICINE

## 2023-01-30 PROCEDURE — 93005 ELECTROCARDIOGRAM TRACING: CPT | Performed by: NURSE PRACTITIONER

## 2023-01-30 PROCEDURE — 99214 OFFICE O/P EST MOD 30 MIN: CPT | Performed by: NURSE PRACTITIONER

## 2023-01-30 NOTE — TELEPHONE ENCOUNTER
Patient had a PVA on 1-4-23. He states that he has not had any episodes of Afib since his procedure until yesterday. He went back in to Afib and his BP dropped. His BP's were 71/61, 93/72, 97/79, 89/77 and his HR ranged from . Today he is still in Afib and his BP is 101/78 and his HR is 62. He states that he feels fine, but just doesn't sleep as good when he is in Afib. He has a scheduled hospital f/u today with Harpal FLORES at 1:00 pm.

## 2023-01-30 NOTE — PATIENT INSTRUCTIONS
- Milton will call with recommendations from Dr. Ruvalcaba    -Continue monitoring blood pressure at home

## 2023-01-30 NOTE — PROGRESS NOTES
"Chief Complaint  Atrial Fibrillation and Establish Care (Post PVA)    Subjective      History of Present Illness {CC  Problem List  Visit  Diagnosis   Encounters  Notes  Medications  Labs  Result Review Imaging  Media :23}     Shaun Tyler, 78 y.o. male with persistent atrial fibrillation, HTN, NATHAN, congenital septal heart defect s/p Amplatzer device closure presents to [facility] for Atrial Fibrillation and Establish Care (Post PVA).    Patient presents today for follow-up after recent atrial fibrillation/atrial tachycardia ablation by Dr. Ruvalcaba 1/4/2023.  Patient was instructed to maintain anticoagulation, as well as PPI for 90 days for esophageal prophylaxis, also instructed to stop metoprolol.    Shaun presents today reporting initially he was feeling very well initially after ablation; he was able to walk up to an hour at the gym. Yesterday morning he did not sleep well and noticed he had low blood pressure with SBP into 90s, improved -120s since then; heart rate . Did not feel well with return to atrial fibrillation with fatigue and dyspnea. Denies chest pain, near syncope/syncope. Compliant with apixaban, no missed doses since ablation.      Objective     Vital Signs:   Vitals:    01/30/23 1428 01/30/23 1430 01/30/23 1431   BP: 110/71 90/52 120/58   BP Location: Right arm Left arm Left arm   Patient Position: Sitting Standing Sitting   Cuff Size: Adult Adult Adult   Pulse: 99 115 91   Resp:   20   Temp:   96.9 °F (36.1 °C)   TempSrc:   Temporal   SpO2: 95% 93% 95%   Weight:   104 kg (229 lb 2 oz)   Height:   193 cm (76\")     Body mass index is 27.89 kg/m².  Physical Exam  Vitals and nursing note reviewed.   Constitutional:       Appearance: Normal appearance.   HENT:      Head: Normocephalic.   Eyes:      Extraocular Movements: Extraocular movements intact.   Neck:      Vascular: No carotid bruit.   Cardiovascular:      Rate and Rhythm: Normal rate. " Rhythm irregular.      Pulses: Normal pulses.      Heart sounds: Normal heart sounds, S1 normal and S2 normal. No murmur heard.  Pulmonary:      Effort: Pulmonary effort is normal. No respiratory distress.      Breath sounds: Normal breath sounds.   Musculoskeletal:      Cervical back: Neck supple.      Right lower leg: No edema.      Left lower leg: No edema.   Skin:     General: Skin is warm and dry.   Neurological:      General: No focal deficit present.      Mental Status: He is alert.   Psychiatric:         Mood and Affect: Mood normal.         Behavior: Behavior normal.         Thought Content: Thought content normal.        Data Reviewed:{ Labs  Result Review  Imaging  Med Tab  Media :23}     EP/CRM Study (01/04/2023 12:57)  ECG 12 Lead (01/04/2023 15:57)  Adult Transesophageal Echo ([facility]NA[facility]) W/ Cont if Necessary Per Protocol (11/22/2022 10:43)  TSH (01/03/2023 09:54)  Magnesium (01/03/2023 09:54)  Basic Metabolic Panel (01/03/2023 09:54)  CBC (No Diff) (01/03/2023 09:54)  Lipid Panel (11/04/2022 10:13)      Assessment & Plan   Assessment and Plan {CC Problem List  Visit Diagnosis  ROS  Review (Popup)  Health Maintenance  Quality  BestPractice  Medications  SmartSets  SnapShot Encounters  Media :23}     1. Persistent atrial fibrillation (HCC)  -s/p recent ablation by Dr. Ruvalcaba  -Recurrent AF yesterday, ECG today with recurrent atrial fibrillation  -Heart rates controlled on home monitoring  -[id number]: 3  -Continue AC with apixaban 5mg every 12h    2. Essential hypertension  -Well controlled  -Recent morning of hypotension, now recovered  -Discussed increasing hydration      Follow Up {Instructions Charge Capture  Follow-up Communications :23}     Return if symptoms worsen or fail to improve.      Patient was given instructions and counseling regarding his condition or for health maintenance advice. Please see specific information pulled into the AVS if appropriate.  Patient was instructed  to call the [facility] with any questions, concerns, or worsening symptoms.    Dictated Utilizing Dragon Dictation   Please note that portions of this note were completed with a voice recognition program.   Part of this note may be an electronic transcription/translation of spoken language to printed text using the Dragon Dictation System.

## 2023-01-31 ENCOUNTER — TELEPHONE (OUTPATIENT)
Dept: CARDIOLOGY | Facility: CLINIC | Age: 78
End: 2023-01-31
Payer: COMMERCIAL

## 2023-01-31 ENCOUNTER — PREP FOR SURGERY (OUTPATIENT)
Dept: OTHER | Facility: HOSPITAL | Age: 78
End: 2023-01-31
Payer: COMMERCIAL

## 2023-01-31 DIAGNOSIS — I48.19 PERSISTENT ATRIAL FIBRILLATION: Primary | ICD-10-CM

## 2023-01-31 DIAGNOSIS — I48.91 A-FIB: Primary | ICD-10-CM

## 2023-01-31 RX ORDER — SODIUM CHLORIDE 9 MG/ML
40 INJECTION, SOLUTION INTRAVENOUS AS NEEDED
Status: CANCELLED | OUTPATIENT
Start: 2023-01-31

## 2023-01-31 RX ORDER — FLUMAZENIL 0.1 MG/ML
0.5 INJECTION INTRAVENOUS ONCE AS NEEDED
Status: CANCELLED | OUTPATIENT
Start: 2023-01-31

## 2023-01-31 RX ORDER — FENTANYL CITRATE 50 UG/ML
50-100 INJECTION, SOLUTION INTRAMUSCULAR; INTRAVENOUS ONCE AS NEEDED
Status: CANCELLED | OUTPATIENT
Start: 2023-01-31

## 2023-01-31 RX ORDER — SODIUM CHLORIDE 0.9 % (FLUSH) 0.9 %
10 SYRINGE (ML) INJECTION EVERY 12 HOURS SCHEDULED
Status: CANCELLED | OUTPATIENT
Start: 2023-01-31

## 2023-01-31 RX ORDER — NALOXONE HCL 0.4 MG/ML
0.4 VIAL (ML) INJECTION ONCE AS NEEDED
Status: CANCELLED | OUTPATIENT
Start: 2023-01-31

## 2023-01-31 RX ORDER — SODIUM CHLORIDE 0.9 % (FLUSH) 0.9 %
10 SYRINGE (ML) INJECTION AS NEEDED
Status: CANCELLED | OUTPATIENT
Start: 2023-01-31

## 2023-01-31 RX ORDER — MIDAZOLAM HYDROCHLORIDE 1 MG/ML
2-8 INJECTION INTRAMUSCULAR; INTRAVENOUS ONCE AS NEEDED
Status: CANCELLED | OUTPATIENT
Start: 2023-01-31

## 2023-01-31 NOTE — TELEPHONE ENCOUNTER
Harpal Boyle APRN Aasbo, Johan D, DO; Ilda Porter RN  Perfect, thank you.     Ilda, thanks for your help. Could you contact patent later this week for cardioversion if he remains in AF.     Thanks again,   Milton         I spoke with patient who states he remains in afib. He is in agreement with plan for cardioversion. Order placed.

## 2023-02-06 LAB
QT INTERVAL: 346 MS
QTC INTERVAL: 474 MS

## 2023-02-09 ENCOUNTER — HOSPITAL ENCOUNTER (OUTPATIENT)
Dept: CARDIOLOGY | Facility: HOSPITAL | Age: 78
Discharge: HOME | End: 2023-02-09
Attending: INTERNAL MEDICINE | Admitting: INTERNAL MEDICINE
Payer: COMMERCIAL

## 2023-02-09 VITALS
OXYGEN SATURATION: 100 % | HEART RATE: 77 BPM | BODY MASS INDEX: 26.71 KG/M2 | RESPIRATION RATE: 16 BRPM | HEIGHT: 77 IN | SYSTOLIC BLOOD PRESSURE: 122 MMHG | WEIGHT: 226.19 LBS | DIASTOLIC BLOOD PRESSURE: 84 MMHG

## 2023-02-09 DIAGNOSIS — I48.91 A-FIB: ICD-10-CM

## 2023-02-09 DIAGNOSIS — I48.19 PERSISTENT ATRIAL FIBRILLATION: ICD-10-CM

## 2023-02-09 PROBLEM — R00.1 BRADYCARDIA: Status: RESOLVED | Noted: 2021-03-05 | Resolved: 2023-02-09

## 2023-02-09 LAB
ANION GAP SERPL CALCULATED.3IONS-SCNC: 10 MMOL/L (ref 5–15)
BUN BLDA-MCNC: 17 MG/DL (ref 8–26)
BUN SERPL-MCNC: 16 MG/DL (ref 8–23)
BUN/CREAT SERPL: 15.4 (ref 7–25)
CA-I BLDA-SCNC: 1.22 MMOL/L (ref 1.2–1.32)
CALCIUM SPEC-SCNC: 9.1 MG/DL (ref 8.6–10.5)
CHLORIDE BLDA-SCNC: 102 MMOL/L (ref 98–109)
CHLORIDE SERPL-SCNC: 102 MMOL/L (ref 98–107)
CO2 BLDA-SCNC: 26 MMOL/L (ref 24–29)
CO2 SERPL-SCNC: 24 MMOL/L (ref 22–29)
CREAT BLDA-MCNC: 1.2 MG/DL (ref 0.6–1.3)
CREAT SERPL-MCNC: 1.04 MG/DL (ref 0.76–1.27)
DEPRECATED RDW RBC AUTO: 44.8 FL (ref 37–54)
EGFRCR SERPLBLD CKD-EPI 2021: 62.7 ML/MIN/1.73
EGFRCR SERPLBLD CKD-EPI 2021: 74.4 ML/MIN/1.73
ERYTHROCYTE [DISTWIDTH] IN BLOOD BY AUTOMATED COUNT: 13.2 % (ref 12.3–15.4)
GLUCOSE BLDC GLUCOMTR-MCNC: 101 MG/DL (ref 70–130)
GLUCOSE SERPL-MCNC: 100 MG/DL (ref 65–99)
HCT VFR BLD AUTO: 46.8 % (ref 37.5–51)
HCT VFR BLDA CALC: 47 % (ref 38–51)
HGB BLD-MCNC: 16.2 G/DL (ref 13–17.7)
HGB BLDA-MCNC: 16 G/DL (ref 12–17)
MAXIMAL PREDICTED HEART RATE: 144 BPM
MCH RBC QN AUTO: 32.3 PG (ref 26.6–33)
MCHC RBC AUTO-ENTMCNC: 34.6 G/DL (ref 31.5–35.7)
MCV RBC AUTO: 93.4 FL (ref 79–97)
PLATELET # BLD AUTO: 199 10*3/MM3 (ref 140–450)
PMV BLD AUTO: 9.6 FL (ref 6–12)
POTASSIUM BLDA-SCNC: 4.5 MMOL/L (ref 3.5–4.9)
POTASSIUM SERPL-SCNC: 4.7 MMOL/L (ref 3.5–5.2)
RBC # BLD AUTO: 5.01 10*6/MM3 (ref 4.14–5.8)
SODIUM BLD-SCNC: 137 MMOL/L (ref 138–146)
SODIUM SERPL-SCNC: 136 MMOL/L (ref 136–145)
STRESS TARGET HR: 122 BPM
WBC NRBC COR # BLD: 5.86 10*3/MM3 (ref 3.4–10.8)

## 2023-02-09 PROCEDURE — 85027 COMPLETE CBC AUTOMATED: CPT | Performed by: INTERNAL MEDICINE

## 2023-02-09 PROCEDURE — 85014 HEMATOCRIT: CPT

## 2023-02-09 PROCEDURE — 92960 CARDIOVERSION ELECTRIC EXT: CPT | Performed by: INTERNAL MEDICINE

## 2023-02-09 PROCEDURE — 93005 ELECTROCARDIOGRAM TRACING: CPT | Performed by: INTERNAL MEDICINE

## 2023-02-09 PROCEDURE — 80048 BASIC METABOLIC PNL TOTAL CA: CPT | Performed by: INTERNAL MEDICINE

## 2023-02-09 PROCEDURE — 92960 CARDIOVERSION ELECTRIC EXT: CPT

## 2023-02-09 PROCEDURE — S0260 H&P FOR SURGERY: HCPCS | Performed by: PHYSICIAN ASSISTANT

## 2023-02-09 PROCEDURE — 80047 BASIC METABLC PNL IONIZED CA: CPT

## 2023-02-09 PROCEDURE — 93010 ELECTROCARDIOGRAM REPORT: CPT | Performed by: INTERNAL MEDICINE

## 2023-02-09 RX ORDER — SODIUM CHLORIDE 0.9 % (FLUSH) 0.9 %
10 SYRINGE (ML) INJECTION EVERY 12 HOURS SCHEDULED
Status: DISCONTINUED | OUTPATIENT
Start: 2023-02-09 | End: 2023-02-09 | Stop reason: HOSPADM

## 2023-02-09 RX ORDER — NALOXONE HCL 0.4 MG/ML
0.4 VIAL (ML) INJECTION ONCE AS NEEDED
Status: DISCONTINUED | OUTPATIENT
Start: 2023-02-09 | End: 2023-02-09 | Stop reason: HOSPADM

## 2023-02-09 RX ORDER — SODIUM CHLORIDE 9 MG/ML
40 INJECTION, SOLUTION INTRAVENOUS AS NEEDED
Status: DISCONTINUED | OUTPATIENT
Start: 2023-02-09 | End: 2023-02-09 | Stop reason: HOSPADM

## 2023-02-09 RX ORDER — MIDAZOLAM HYDROCHLORIDE 1 MG/ML
2-8 INJECTION INTRAMUSCULAR; INTRAVENOUS ONCE AS NEEDED
Status: DISCONTINUED | OUTPATIENT
Start: 2023-02-09 | End: 2023-02-09 | Stop reason: HOSPADM

## 2023-02-09 RX ORDER — FLUMAZENIL 0.1 MG/ML
0.5 INJECTION INTRAVENOUS ONCE AS NEEDED
Status: DISCONTINUED | OUTPATIENT
Start: 2023-02-09 | End: 2023-02-09 | Stop reason: HOSPADM

## 2023-02-09 RX ORDER — FENTANYL CITRATE 50 UG/ML
50-100 INJECTION, SOLUTION INTRAMUSCULAR; INTRAVENOUS ONCE AS NEEDED
Status: DISCONTINUED | OUTPATIENT
Start: 2023-02-09 | End: 2023-02-09 | Stop reason: HOSPADM

## 2023-02-09 NOTE — OP NOTE
Cardiac Electrophysiology Procedure Note          [facility]      Date of Admission:  2/9/2023            Date of Procedure:  2/9/2023    PROCEDURE: Transthoracic Direct Current Cardioversion    DIAGNOSIS AND PRESENTING RHYTHM: AF    POSTPROCEDURE DIAGNOSIS: Same    The risks, benefits, approach and potential complications of the procedure were discussed with the patient and informed consent was obtained prior to the administration of anesthesia.    The patient was able to give written informed consent after revisiting the key portions of the risk versus benefit profile of the procedure.  This discussion was framed by our lengthy conversations  (please see our detailed notes).  Patient verbalized strong understanding of this discussion and a strong desire to proceed with the procedure.  Please note that this detailed informed consent process utilized mutual and shared decision making process between all parties involved, principally the physician and patient, but also potentially with input from the patient's selected family and friends.     The patient is presently anticoagulated with DOAC    Attending Electrophysiologist: Escobar Ruvalcaba DO.    MODERATE SEDATION FOR PROCEDURE:    Moderate sedation was given during this procedure.    I supervised and directed RN to administer this sedation.  This staff member also monitored the patient's hemodynamic and respiratory status and response to these medications.  Please see the full detailed procedure report generated by the electrophysiology laboratory staff.  The patient tolerated moderate sedation well.  There were no complications regarding sedation.  The total dose of fentanyl was 0 mcg and the total dose of midazolam was 0 mg.  The total dose of Brevital was 50 mg.  First sedation was administered at 1148 and continued through [id number].    A time-out was completed verifying correct patient, procedure, positioning, and special  "equipment prior to beginning this procedure.    PT/INR:  No results found for: LABPROT, INR  PTT:  No results found for: APTT    CBC:   WBC   Date Value Ref Range Status   02/09/2023 5.86 3.40 - 10.80 10*3/mm3 Final     RBC   Date Value Ref Range Status   02/09/2023 5.01 4.14 - 5.80 10*6/mm3 Final     Hemoglobin   Date Value Ref Range Status   02/09/2023 16.2 13.0 - 17.7 g/dL Final   02/09/2023 16.0 12.0 - 17.0 g/dL Final     Comment:     Serial Number: [id number]:  [id number]     Hematocrit   Date Value Ref Range Status   02/09/2023 46.8 37.5 - 51.0 % Final   02/09/2023 47 38 - 51 % Final     MCV   Date Value Ref Range Status   02/09/2023 93.4 79.0 - 97.0 fL Final     RDW   Date Value Ref Range Status   02/09/2023 13.2 12.3 - 15.4 % Final     Platelets   Date Value Ref Range Status   02/09/2023 199 140 - 450 10*3/mm3 Final     BMP:     Sodium   Date Value Ref Range Status   02/09/2023 136 136 - 145 mmol/L Final     Potassium   Date Value Ref Range Status   02/09/2023 4.7 3.5 - 5.2 mmol/L Final     Comment:     Slight hemolysis detected by analyzer. Results may be affected.     Chloride   Date Value Ref Range Status   02/09/2023 102 98 - 107 mmol/L Final     CO2   Date Value Ref Range Status   02/09/2023 24.0 22.0 - 29.0 mmol/L Final     BUN   Date Value Ref Range Status   02/09/2023 16 8 - 23 mg/dL Final     Creatinine   Date Value Ref Range Status   02/09/2023 1.04 0.76 - 1.27 mg/dL Final   02/09/2023 1.20 0.60 - 1.30 mg/dL Final     eGFR   Date Value Ref Range Status   02/09/2023 74.4 >60.0 mL/min/1.73 Final       Vital Signs: /100   Pulse 69   Resp 16   Ht 195.6 cm (77\")   Wt 103 kg (226 lb 3.1 oz)   SpO2 100%   BMI 26.82 kg/m²  O2 Flow Rate (L/min): 6 L/min   Admit Weight:  103 kg (226 lb 3.1 oz)  BMI: Body mass index is 26.82 kg/m².    Procedural Narrative:  The patient was brought to the electrophysiology procedure area in the fasting state and placed on a cardiac monitor.  External defibrillation " pads were applied in the anterior-posterior configuration. Surface electrocardiography confirmed the above indicated atrial arrhythmia.  Once adequate level of anesthesia was obtained, a synchronized biphasic shock of 200 Joules energy was delivered.  The resultant rhythm was SR.  Neurologic examination following recovery from anesthesia demonstrated no focal deficits. The patient tolerated the procedure well and there were no complications.    CONCLUSION:  Succesful DC cardioversion.    RECOMMENDATION:     1.  Follow up with referring physician or clinical service.  2.  This patient will require 4 weeks of uninterrupted anticoagulation.    Escobar Ruvalcaba DO

## 2023-02-09 NOTE — H&P
[facility] Electrophysiology    Date of Hospital Visit: 23    Place of Service: [facility]    Patient Name: Shaun Tyler  :1945      Primary Care Provider: Alejandro Richard PA-C    Chief complaint/Reason for Consultation:  Atrial Fibrillation    Problem List:  Active Hospital Problems    Diagnosis  POA   • **A-fib (HCC) [I48.91]  Yes     Priority: High     · 20: successful ECV  · Echo 21: EF 61 - 65%. Left ventricular systolic function is normal. No significant structural or functional valvular disease.  · MPS 2021:no evidence of ischemia.Left ventricular ejection fraction is hyperdynamic (Calculated EF > 70%).  · ECV 22:  • EPS, 2023 with Right antral circumferential pulmonary vein isolation. Extensive ablation involving wide antral circumferential pulmonary vein isolation left atrial roof left atrial floor entire left atrial posterior wall and base of left atrial appendage     • Congenital septal defect of heart [Q21.9]  Not Applicable     Priority: Medium     · Hx of ASD closure, in [place], approximately   · SENAIT, 2022: Normal LV, EF 55-60%.  Amplatzer atrial septal occluder device with normal appearance and no residual leak.  Normal left atrial appendage     • NATHAN (obstructive sleep apnea) - possible [G47.33]  Yes     Priority: Low   • Essential hypertension [I10]  Yes     Priority: Low   • Hyperlipidemia LDL goal <100 [E78.5]  Yes     Priority: Low             History of Present Illness:  This is a 38-year-old male with a history of atrial fibrillation status post pulmonary vein isolation and ASD closure.  He stayed in rhythm for couple weeks after his ablation but went back out of rhythm on 2023.  He was seen in the heart and valve clinic and brought to our attention.  He has not converted back to sinus rhythm.  Presents today for cardioversion.  He states compliance with Eliquis.        Allergies   Allergen Reactions  "  • Penicillins Swelling     Face and lips as a child    • Simvastatin Myalgia     \"weakness\"         Current Outpatient Medications   Medication Instructions   • Eliquis 5 MG tablet tablet TAKE ONE TABLET BY MOUTH TWICE A DAY   • ferrous sulfate 325 mg, Oral, Weekly, sundays   • fluticasone (FLONASE) 50 MCG/ACT nasal spray 2 sprays, Nasal, As Needed   • lansoprazole (PREVACID) 30 mg, Oral, Daily   • psyllium (METAMUCIL) 58.6 % packet 1 packet, Oral, Daily           Social History     Socioeconomic History   • Marital status:    Tobacco Use   • Smoking status: Never   • Smokeless tobacco: Never   Vaping Use   • Vaping Use: Never used   Substance and Sexual Activity   • Alcohol use: Yes     Alcohol/week: 7.0 standard drinks     Types: 7 Glasses of wine per week   • Drug use: Never   • Sexual activity: Defer       Family History   Problem Relation Age of Onset   • Cancer Mother    • Pneumonia Sister        REVIEW OF SYSTEMS:   Review of Systems   Constitutional: Positive for malaise/fatigue. Negative for diaphoresis and weight gain.   Cardiovascular: Positive for palpitations. Negative for chest pain, claudication, dyspnea on exertion, irregular heartbeat, leg swelling, orthopnea, paroxysmal nocturnal dyspnea and syncope.   Respiratory: Positive for shortness of breath. Negative for cough, sleep disturbances due to breathing and wheezing.    Hematologic/Lymphatic: Negative for bleeding problem.   Musculoskeletal: Negative for muscle cramps, muscle weakness and myalgias.   Gastrointestinal: Negative for heartburn.   Neurological: Negative for weakness.            Objective:  Vitals:    02/09/23 1024 02/09/23 1025   BP: (!) 141/105 129/89   BP Location: Right arm Left arm   Patient Position: Lying Lying   Pulse: 87    Resp: 16    Weight: 103 kg (226 lb 3.1 oz)    Height: 195.6 cm (77\")      Body mass index is 26.82 kg/m².  Flowsheet Rows    Flowsheet Row First Filed Value   Admission Height 195.6 cm (77\") " Documented at 02/09/2023 1024   Admission Weight 103 kg (226 lb 3.1 oz) Documented at 02/09/2023 1024          Vitals reviewed.   Constitutional:       Appearance: Well-developed.   Pulmonary:      Effort: Pulmonary effort is normal. No respiratory distress.      Breath sounds: Normal breath sounds. No wheezing. No rales.      Comments: Bases clear  Chest:      Chest wall: Not tender to palpatation.   Cardiovascular:      Normal rate. Irregular rhythm.      Murmurs: There is no murmur.      No gallop. No click. No rub.   Pulses:     Intact distal pulses.   Edema:     Peripheral edema absent.   Musculoskeletal: Normal range of motion.           Lab Review:               CrCl cannot be calculated (Patient's most recent lab result is older than the maximum 30 days allowed.).  No results found for: INR, PROTIME         Assessment:   · Persistent atrial fibrillation        Plan:   · ECV        Electronically signed by CHAKA Anne, 02/09/23, 10:39 AM EST.

## 2023-02-13 ENCOUNTER — OFFICE VISIT (OUTPATIENT)
Dept: CARDIOLOGY | Facility: HOSPITAL | Age: 78
End: 2023-02-13
Payer: COMMERCIAL

## 2023-02-13 VITALS
BODY MASS INDEX: 26.68 KG/M2 | RESPIRATION RATE: 18 BRPM | HEIGHT: 77 IN | WEIGHT: 226 LBS | TEMPERATURE: 96.5 F | SYSTOLIC BLOOD PRESSURE: 111 MMHG | DIASTOLIC BLOOD PRESSURE: 72 MMHG | HEART RATE: 56 BPM | OXYGEN SATURATION: 95 %

## 2023-02-13 DIAGNOSIS — I48.19 PERSISTENT ATRIAL FIBRILLATION: Primary | ICD-10-CM

## 2023-02-13 PROCEDURE — 99214 OFFICE O/P EST MOD 30 MIN: CPT | Performed by: NURSE PRACTITIONER

## 2023-02-13 NOTE — PROGRESS NOTES
"Chief Complaint  Follow-up and Atrial Fibrillation    Subjective      History of Present Illness {  Problem List  Visit  Diagnosis   Encounters  Notes  Medications  Labs  Result Review Imaging  Media :23}     Shaun Tyler, 78 y.o. male with persistent atrial fibrillation, HTN, NATHAN, congenital septal heart defect s/p Amplatzer device closure presents to [facility] Valve clinic for Follow-up and Atrial Fibrillation.    Patient presents today for follow-up after recent atrial fibrillation/atrial tachycardia ablation by Dr. Ruvalcaba 1/4/2023.  Patient was instructed to maintain anticoagulation, as well as PPI for 90 days for esophageal prophylaxis, also instructed to stop metoprolol.  Patient was noted to be in atrial fibrillation during previous evaluation, Dr. Ruvalcaba recommended cardioversion.  Patient underwent successful cardioversion 2/9/2023 with Dr. Ruvalcaba with conversion to normal sinus rhythm.    Shaun presents today feeling well overall from a cardiovascular standpoint, feels much better in normal rhythm.  No recurrent arrhythmia symptoms since recent cardioversion.  Blood pressures well controlled on home monitoring.  Denies chest pain, near syncope/syncope, dyspnea. Occasional lightheadedness; trying to maintain 2 quarts of water daily.       Objective     Vital Signs:   Vitals:    02/13/23 1018   BP: 111/72   BP Location: Left arm   Patient Position: Sitting   Cuff Size: Adult   Pulse: 56   Resp: 18   Temp: 96.5 °F (35.8 °C)   TempSrc: Temporal   SpO2: 95%   Weight: 103 kg (226 lb)   Height: 195.6 cm (77\")     Body mass index is 26.8 kg/m².  Physical Exam  Vitals and nursing note reviewed.   Constitutional:       Appearance: Normal appearance.   HENT:      Head: Normocephalic.   Eyes:      Extraocular Movements: Extraocular movements intact.   Neck:      Vascular: No carotid bruit.   Cardiovascular:      Rate and Rhythm: Normal rate and regular rhythm.      Pulses: Normal pulses. "      Heart sounds: Normal heart sounds, S1 normal and S2 normal. No murmur heard.  Pulmonary:      Effort: Pulmonary effort is normal. No respiratory distress.      Breath sounds: Normal breath sounds.   Musculoskeletal:      Cervical back: Neck supple.      Right lower leg: No edema.      Left lower leg: No edema.   Skin:     General: Skin is warm and dry.   Neurological:      General: No focal deficit present.      Mental Status: He is alert.   Psychiatric:         Mood and Affect: Mood normal.         Behavior: Behavior normal.         Thought Content: Thought content normal.        Data Reviewed:{ Labs  Result Review  Imaging  Med Tab  Media :23}     EP/CRM Study (01/04/2023 12:57)  ECG 12 Lead (01/04/2023 15:57)  Adult Transesophageal Echo (SENAIT) W/ Cont if Necessary Per Protocol (11/22/2022 10:43)  TSH (01/03/2023 09:54)  Magnesium (01/03/2023 09:54)  Basic Metabolic Panel (01/03/2023 09:54)  CBC (No Diff) (01/03/2023 09:54)  Lipid Panel (11/04/2022 10:13)      Assessment & Plan   Assessment and Plan {CC Problem List  Visit Diagnosis  ROS  Review (Popup)  Health Maintenance  Quality  BestPractice  Medications  SmartSets  SnapShot Encounters  Media :23}     1. Persistent atrial fibrillation (HCC)  -s/p recent ablation by Dr. Ruvalcaba, with successful cardioversion to normal sinus rhythm  -No recurrent arrhythmia symptoms after recent cardioversion  -Heart rates controlled on home monitoring  -[id number]: 3  -Continue AC with apixaban 5mg every 12h    2. Essential hypertension  -Well controlled  -Discussed maintaining adequate hydration, compression sock use for episodic leg weakness    3.  Suspected sleep apnea  -Continue upcoming appointment with sleep medicine for sleep study      Follow Up {Instructions Charge Capture  Follow-up Communications :23}     Return if symptoms worsen or fail to improve.    Time Spent: I spent 31 minutes caring for Shaun Tyler on this date of service. This  time includes time spent by me in the following activities: preparing for the visit, reviewing tests, obtaining and/or reviewing a separately obtained history, performing a medically appropriate examination and/or evaluation, counseling and educating the patient/family/caregiver, documenting information in the medical record and independently interpreting results and communicating that information with the patient/family/caregiver. All time noted occurred on the date of service.      Patient was given instructions and counseling regarding his condition or for health maintenance advice. Please see specific information pulled into the AVS if appropriate.  Patient was instructed to call the [facility] with any questions, concerns, or worsening symptoms.    Dictated Utilizing Dragon Dictation   Please note that portions of this note were completed with a voice recognition program.   Part of this note may be an electronic transcription/translation of spoken language to printed text using the Dragon Dictation System.

## 2023-02-22 LAB
QT INTERVAL: 432 MS
QTC INTERVAL: 409 MS

## 2023-02-23 ENCOUNTER — HOSPITAL ENCOUNTER (OUTPATIENT)
Dept: SLEEP MEDICINE | Facility: HOSPITAL | Age: 78
Discharge: HOME | End: 2023-02-23
Admitting: INTERNAL MEDICINE
Payer: COMMERCIAL

## 2023-02-23 VITALS — HEIGHT: 77 IN | WEIGHT: 227.07 LBS | BODY MASS INDEX: 26.81 KG/M2

## 2023-02-23 DIAGNOSIS — G47.33 OSA (OBSTRUCTIVE SLEEP APNEA): ICD-10-CM

## 2023-02-23 DIAGNOSIS — R06.83 SNORING: ICD-10-CM

## 2023-02-23 PROCEDURE — 95806 SLEEP STUDY UNATT&RESP EFFT: CPT

## 2023-02-28 DIAGNOSIS — G47.33 OSA (OBSTRUCTIVE SLEEP APNEA): Primary | ICD-10-CM

## 2023-03-06 PROCEDURE — 95806 SLEEP STUDY UNATT&RESP EFFT: CPT | Performed by: INTERNAL MEDICINE

## 2023-03-09 ENCOUNTER — OFFICE VISIT (OUTPATIENT)
Dept: SLEEP MEDICINE | Facility: HOSPITAL | Age: 78
End: 2023-03-09
Payer: COMMERCIAL

## 2023-03-09 VITALS
WEIGHT: 230.2 LBS | BODY MASS INDEX: 27.18 KG/M2 | OXYGEN SATURATION: 97 % | DIASTOLIC BLOOD PRESSURE: 65 MMHG | SYSTOLIC BLOOD PRESSURE: 115 MMHG | HEART RATE: 60 BPM | HEIGHT: 77 IN

## 2023-03-09 DIAGNOSIS — G47.33 OSA (OBSTRUCTIVE SLEEP APNEA): Primary | ICD-10-CM

## 2023-03-09 PROCEDURE — 99213 OFFICE O/P EST LOW 20 MIN: CPT | Performed by: INTERNAL MEDICINE

## 2023-03-09 NOTE — PROGRESS NOTES
Subjective:     Chief Complaint:   Chief Complaint   Patient presents with   • Follow-up       HPI:    Shaun Tyler is a 78 y.o. male here for follow-up of NATHAN    I saw him in initial consultation in November.  He had a recent diagnosis of atrial fibrillation and underwent ECV.  He has been told that he snores by his wife and she had witnessed apneas.  He did not note much in the way of daytime somnolence.  Due to the suspicion of obstructive sleep apnea a home sleep apnea test was ordered and performed on 2/23.  This revealed a severe degree of NATHAN with an AHI of 48.    He is here for review of the results and further recommendations.    He has no new sleep symptoms.  His symptoms are much as they were when he was here in November.    Harmony Scale is: 8/24    Current medications are:   Current Outpatient Medications:   •  Eliquis 5 MG tablet tablet, TAKE ONE TABLET BY MOUTH TWICE A DAY (Patient taking differently: Take 1 tablet by mouth Every 12 (Twelve) Hours.), Disp: 180 tablet, Rfl: 3  •  fluticasone (FLONASE) 50 MCG/ACT nasal spray, 2 sprays into the nostril(s) as directed by provider As Needed for Rhinitis or Allergies., Disp: , Rfl:   •  lansoprazole (PREVACID) 30 MG capsule, Take 1 capsule by mouth Daily., Disp: , Rfl:   •  psyllium (METAMUCIL) 58.6 % packet, Take 1 packet by mouth Daily., Disp: , Rfl:   •  ferrous sulfate 325 (65 FE) MG tablet, Take 1 tablet by mouth 1 (One) Time Per Week. sundays, Disp: , Rfl: .      The patient's relevant past medical, surgical, family and social history were reviewed and updated in Epic as appropriate.     ROS:    Review of Systems      Objective:    Physical Exam  Vitals reviewed.   Constitutional:       Appearance: He is well-developed.   HENT:      Head: Normocephalic and atraumatic.      Mouth/Throat:      Mouth: Mucous membranes are moist.      Pharynx: Oropharynx is clear.      Comments: Class III airway  Neck:      Thyroid: No thyromegaly.   Cardiovascular:       Rate and Rhythm: Normal rate and regular rhythm.      Heart sounds: No murmur heard.    No friction rub. No gallop.   Pulmonary:      Effort: Pulmonary effort is normal. No respiratory distress.      Breath sounds: No wheezing or rales.   Musculoskeletal:      Cervical back: Neck supple.   Skin:     General: Skin is warm and dry.   Neurological:      Mental Status: He is alert and oriented to person, place, and time.   Psychiatric:         Behavior: Behavior normal.         Thought Content: Thought content normal.         DATA:    Reviewed home sleep apnea test with the patient in detail    Assessment:    Problem List Items Addressed This Visit        Pulmonary Problems    NATHAN (obstructive sleep apnea) - Primary       78-year-old male with a recent diagnosis of atrial fibrillation and a home sleep apnea test revealing severe obstructive sleep apnea.  I went over the study in detail with him and answered all questions.  I went over treatment options.  We reviewed the long-term risks of untreated sleep apnea particularly from a cardiovascular standpoint including atrial fibrillation.    Plan:     1. He was agreeable to a trial of CPAP therapy  2. Will initiate auto CPAP at a range of 8-18 cm H2O  3. We discussed CPAP adaptation and mask options  4. He knows to call us if he has any issues with getting CPAP started and any questions that his [employer] cannot address.  5. Close sleep center follow-up    Discussed in detail with the patient.  He will call prior to his follow up visit for any new problems.      Signed by  Kaushal Wiggins MD

## 2023-03-28 ENCOUNTER — TELEPHONE (OUTPATIENT)
Dept: CARDIOLOGY | Facility: CLINIC | Age: 78
End: 2023-03-28
Payer: COMMERCIAL

## 2023-03-28 NOTE — TELEPHONE ENCOUNTER
Patient called and states he was given a prescription for Flomax. He wanted to make sure it was ok from your standpoint before starting it.

## 2023-04-06 ENCOUNTER — TELEPHONE (OUTPATIENT)
Dept: SLEEP MEDICINE | Facility: HOSPITAL | Age: 78
End: 2023-04-06

## 2023-04-06 ENCOUNTER — TELEPHONE (OUTPATIENT)
Dept: CARDIOLOGY | Facility: CLINIC | Age: 78
End: 2023-04-06
Payer: COMMERCIAL

## 2023-04-06 NOTE — TELEPHONE ENCOUNTER
ADVISED MIHAELA THAT SOAP AND WATER IS BEST WAY TO CLEAN MACHINE AND THAT USING CLEANING MACHINES COULD JEOPARDIZE INSURANCE PAYMENT.

## 2023-04-06 NOTE — TELEPHONE ENCOUNTER
Patient called and left a voicemail, I returned call no answer. I left a message for a call back.

## 2023-04-06 NOTE — TELEPHONE ENCOUNTER
Caller: MIHAELA MENDOZA    Relationship to patient: Emergency Contact    Best call back number: [phone]    PATIENT'S WIFE WOULD LIKE TO KNOW WHAT THE OFFICE RECOMMENDS FOR CLEANING THE CPAP MACHINES.     SHE IS INTERESTED IN AUTOMATIC CPAP CLEANING MACHINES.     PLEASE CALL PATIENT'S WIFE TO ADVISE.

## 2023-04-07 ENCOUNTER — TELEPHONE (OUTPATIENT)
Dept: CARDIOLOGY | Facility: CLINIC | Age: 78
End: 2023-04-07
Payer: COMMERCIAL

## 2023-04-07 NOTE — TELEPHONE ENCOUNTER
Patient's wife wanted to make you aware that he is currently at [facility] and needs to have a pacemaker. His HR is in the 30's and they are not able to do a procedure that he needs to have done to check his gallbladder and liver. 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 She is very concerned and would like your help.

## 2023-04-10 ENCOUNTER — OFFICE VISIT (OUTPATIENT)
Dept: CARDIOLOGY | Facility: CLINIC | Age: 78
End: 2023-04-10
Payer: COMMERCIAL

## 2023-04-10 VITALS
WEIGHT: 225 LBS | HEART RATE: 65 BPM | BODY MASS INDEX: 26.57 KG/M2 | SYSTOLIC BLOOD PRESSURE: 124 MMHG | HEIGHT: 77 IN | DIASTOLIC BLOOD PRESSURE: 86 MMHG

## 2023-04-10 DIAGNOSIS — I48.0 PAROXYSMAL ATRIAL FIBRILLATION: Primary | ICD-10-CM

## 2023-04-10 DIAGNOSIS — G47.33 OSA (OBSTRUCTIVE SLEEP APNEA): ICD-10-CM

## 2023-04-10 DIAGNOSIS — Q21.9 CONGENITAL SEPTAL DEFECT OF HEART: ICD-10-CM

## 2023-04-10 DIAGNOSIS — Z79.01 CHRONIC ANTICOAGULATION: ICD-10-CM

## 2023-04-10 DIAGNOSIS — I10 ESSENTIAL HYPERTENSION: ICD-10-CM

## 2023-04-10 PROCEDURE — 99214 OFFICE O/P EST MOD 30 MIN: CPT | Performed by: INTERNAL MEDICINE

## 2023-04-10 RX ORDER — TAMSULOSIN HYDROCHLORIDE 0.4 MG/1
0.4 CAPSULE ORAL DAILY
Qty: 30 CAPSULE | Refills: 11 | COMMUNITY
Start: 2023-03-24 | End: 2024-03-23

## 2023-04-10 NOTE — PROGRESS NOTES
Cardiac Electrophysiology Outpatient Follow Up Note            [facility] at [facility]    Follow Up Office Visit      Shaun Tyler  [id number]  04/10/2023  [unfilled]  [unfilled]    Primary Care Physician: Alejandro Richard PA-C    Referred By: No ref. provider found    Subjective     Chief Complaint:   Diagnoses and all orders for this visit:    1. Paroxysmal atrial fibrillation (Primary)    2. NATHAN (obstructive sleep apnea)    3. Essential hypertension    4. Chronic anticoagulation    5. Congenital septal defect of heart      Chief Complaint   Patient presents with   • PAF   • Hypertension       History of Present Illness:   Shaun Tyler is a 78 y.o. male who presents to my electrophysiology clinic for follow up of above complaints.  Mr. Tyler feels relatively well.  His A-fib symptoms are a lot better he is not really sure he had any A-fib since his ablation.    Had some hematuria.  Went to the ER at  was admitted.  Hematuria resolved however imaging studies revealed a stricture of the common bile duct.  ERCP was scheduled and then canceled by anesthesia due to rising sinus bradycardia.  He felt fine at the time his blood pressure was normal.    His wife is scheduled to have valve surgery in the next couple of weeks at the [facility] for mitral pathology.    Past Medical History:   Past Medical History:   Diagnosis Date   • A-fib    • Atrial septal defect    • Chicken pox    • GERD (gastroesophageal reflux disease)    • Hematuria, unspecified type    • Hiatal hernia    • [place] (hard of hearing)     does not wear his hearing aids   • Measles    • Mumps    • S/P patch closure of atrial septal defect    • Sleep apnea     does not use c-pa p   • Wears dentures        Past Surgical History:   Past Surgical History:   Procedure Laterality Date   • ATRIAL SEPTAL DEFECT REPAIR  2005   • CARDIAC ELECTROPHYSIOLOGY PROCEDURE N/A 1/4/2023    Procedure: PVA  "(persistent), Rythmia poss STX, no meds to hold;  Surgeon: Escobar Ruvalcaba DO;  Location: [facility] EP INVASIVE LOCATION;  Service: Cardiovascular;  Laterality: N/A;   • CARDIOVERSION      11/4/2022 per Dr. Parker   • COLONOSCOPY     • ENDOSCOPY     • EXCISION / REPAIR HYDROCELE PEDIATRIC     • EYE SURGERY Bilateral     lasik and cataract   • TONSILLECTOMY     • WISDOM TOOTH EXTRACTION         Family History:   Family History   Problem Relation Age of Onset   • Cancer Mother    • Pneumonia Sister        Social History:   Social History     Socioeconomic History   • Marital status:    Tobacco Use   • Smoking status: Never   • Smokeless tobacco: Never   Vaping Use   • Vaping Use: Never used   Substance and Sexual Activity   • Alcohol use: Yes     Alcohol/week: 7.0 standard drinks     Types: 7 Glasses of wine per week   • Drug use: Never   • Sexual activity: Defer       Medications:     Current Outpatient Medications:   •  Eliquis 5 MG tablet tablet, TAKE ONE TABLET BY MOUTH TWICE A DAY (Patient taking differently: Take 1 tablet by mouth Every 12 (Twelve) Hours.), Disp: 180 tablet, Rfl: 3  •  ferrous sulfate 325 (65 FE) MG tablet, Take 1 tablet by mouth 1 (One) Time Per Week. sundays, Disp: , Rfl:   •  fluticasone (FLONASE) 50 MCG/ACT nasal spray, 2 sprays into the nostril(s) as directed by provider As Needed for Rhinitis or Allergies., Disp: , Rfl:   •  lansoprazole (PREVACID) 30 MG capsule, Take 1 capsule by mouth Daily., Disp: , Rfl:   •  psyllium (METAMUCIL) 58.6 % packet, Take 1 packet by mouth Daily., Disp: , Rfl:   •  tamsulosin (FLOMAX) 0.4 MG capsule 24 hr capsule, Take 1 capsule by mouth Daily., Disp: 30 capsule, Rfl: 11    Allergies:   Allergies   Allergen Reactions   • Penicillins Swelling     Face and lips as a child    • Simvastatin Myalgia     \"weakness\"         Objective   Vital Signs:   Vitals:    04/10/23 1406   BP: 124/86   BP Location: Right arm   Patient Position: Sitting   Pulse: 65   Weight: " "102 kg (225 lb)   Height: 195.6 cm (77.01\")       PHYSICAL EXAM  General appearance: Awake, alert, cooperative  Head: Normocephalic, without obvious abnormality, atraumatic  Eyes: Conjunctivae/corneas clear, EOMs intact  Neck: no adenopathy, no carotid bruit, no JVD and thyroid: not enlarged  Lungs: clear to auscultation bilaterally and no rhonchi or crackles\", ' symmetric  Heart: regular rate and rhythm, S1, S2 normal, no murmur, click, rub or gallop  Abdomen: Soft, non-tender, bowel sounds normal,  no organomegaly  Extremities: extremities normal, atraumatic, no cyanosis or edema  Skin: Skin color, turgor normal, no rashes or lesions  Neurologic: Grossly normal     Lab Results   Component Value Date    GLUCOSE 100 (H) 02/09/2023    CALCIUM 9.1 02/09/2023     02/09/2023    K 4.7 02/09/2023    CO2 24.0 02/09/2023     02/09/2023    BUN 16 02/09/2023    CREATININE 1.04 02/09/2023    EGFRIFAFRI >60 11/23/2021    EGFRIFNONA >60 11/23/2021    BCR 15.4 02/09/2023    ANIONGAP 10.0 02/09/2023     Lab Results   Component Value Date    WBC 5.86 04/07/2023    HGB 15.2 04/07/2023    HCT 43.3 04/07/2023    MCV 90 04/07/2023     04/07/2023     Lab Results   Component Value Date    INR 1.2 (H) 04/06/2023     Lab Results   Component Value Date    TSH 3.630 01/03/2023       Cardiac Testing:     I personally viewed and interpreted the patient's EKG/Telemetry/lab data    Procedures    Tobacco Cessation: N/A  Obstructive Sleep Apnea Screening: Completed    Advance Care Planning   ACP discussion was declined by the patient. Patient does not have an advance directive, declines further assistance.       Assessment & Plan    Diagnoses and all orders for this visit:    1. Paroxysmal atrial fibrillation (Primary)    2. NATHAN (obstructive sleep apnea)    3. Essential hypertension    4. Chronic anticoagulation    5. Congenital septal defect of heart         Diagnosis Plan   1. Paroxysmal atrial fibrillation   catheter " ablation.  Transseptal puncture around ASD occluder.    Significant symptomatic improvement.  No symptoms of A-fib since then.    Some hematuria with anticoagulation he has subsequently resumed anticoagulation and no further hematuria.      2. NATHAN (obstructive sleep apnea)   severe.  CPAP compliant now.  Feels well.      3. Essential hypertension   blood pressure well controlled.      4. Chronic anticoagulation   for primary prevention of stroke.      5. Congenital septal defect of heart   very large ASD occluder placed remotely.  Transseptal puncture performed safely in the EP lab for his A-fib ablation.   6.  Mass found at the common bile duct.  ERCP scheduled as an inpatient over [facility] after a presentation for hematuria however this was canceled by the anesthesia team due to mild asymptomatic resting sinus bradycardia at 39 bpm associated with a normal blood pressure.    This patient has had lifelong mild sinus bradycardia    He has no indication for pacemaker    He should proceed with the ERCP as soon as possible.    No further testing work-up or medication adjustment is indicated.    I have advised the patient to please call the GI team at  and/or his primary care physician to reschedule this is soon as possible.  Patient is to call me if he runs into any difficulty and we will see if we can assist from our point of view.    Low perioperative risk.    Hold apixaban for 2 days beforehand.     Body mass index is 26.68 kg/m².    I spent 43 minutes in consultation with this patient which included more than 65% of this time in direct face-to-face counseling, physical examination and discussion of my assessment and findings and this shared decision making with the patient.  The remainder of the time not spent face-to-face was performing one, some or all of the following actions: preparing to see the patient (e.g. reviewing tests, prior clinicians' notes, etc), ordering medications, tests or  procedures, coordination of care, discussion of the plan with other healthcare providers, documenting clinical information in epic as well as independently interpreting results and communication of these results to the patient family and/or caregiver(s).  Please note that this explicitly excludes time spent on other separate billable services such as performing procedures or test interpretation, when applicable.      Follow Up:       Thank you for allowing me to participate in the care of your patient. Please to not hesitate to contact me with additional questions or concerns.      Escobar Ruvalcaba DO, FACC, RS  Cardiac Electrophysiologist  [facility] / [facility]

## 2023-04-12 NOTE — PROGRESS NOTES
OFFICE VISIT  NOTE  [facility] CARDIOLOGY MAIN CAMPUS      Name: Shaun Tyler    Date: 2023  MRN:  [id number]  :  1945      REFERRING/PRIMARY PROVIDER:  Alejandro Richard PA-C     Chief Complaint   Patient presents with   • Follow-up     Persistent atrial fibrillation, S/P cardioversion (2023) and ablation (2023)       HPI: Shaun Tyler is a 78 y.o. male who presents today for follow-up for a-fib.  Associated history of percutaneous ASD closure in , hypertension, hyperlipidemia.  Work-up at  2020 includes echocardiogram showing EF of 40 to 50%, mild LA enlargement, moderate RA enlargement, normal pulmonary pressures, ASD closure device in place.   Started flecainide 3/23/2020, underwent successful external cardioversion 2020.  Low risk stress test and echo 2021. Had recurrent Afib despite increase in Flecainide and was referred to Dr. Ruvalcaba. Had PVA for persistent Afib 2023.  Doing well from A-fib standpoint, maintaining sinus rhythm feels much better.  Admitted to  23 with hematuria, found to have enlarged common bile duct, MRCP showed area of stricture and possible enhancement again possible lesion, ERCP was scheduled but not performed because anesthesia canceled it due to asymptomatic bradycardia of 39.  Today he is anxious to get the ERCP performed, he has not heard back from  regarding scheduling.  No further hematuria.  Tolerating Eliquis 5 mg twice daily.    ROS:Pertinent positives as listed in the HPI.  All other systems reviewed and negative.    Past Medical History:   Diagnosis Date   • A-fib    • Atrial septal defect    • Chicken pox    • GERD (gastroesophageal reflux disease)    • Hematuria, unspecified type    • Hiatal hernia    • [place] (hard of hearing)     does not wear his hearing aids   • Hyperbilirubinemia    • Measles    • Mumps    • S/P patch closure of atrial septal defect    • Sleep apnea     does not use  "guillermina p   • Wears dentures        Past Surgical History:   Procedure Laterality Date   • ATRIAL SEPTAL DEFECT REPAIR  2005   • CARDIAC ELECTROPHYSIOLOGY PROCEDURE N/A 01/04/2023    Procedure: PVA (persistent), Rythmia poss STX, no meds to hold;  Surgeon: Escobar Ruvalcaba DO;  Location: [facility] INVASIVE LOCATION;  Service: Cardiovascular;  Laterality: N/A;   • CARDIOVERSION      11/4/2022 per Dr. Parker   • COLONOSCOPY     • ENDOSCOPY     • EXCISION / REPAIR HYDROCELE PEDIATRIC     • EYE SURGERY Bilateral     lasik and cataract   • TONSILLECTOMY     • WISDOM TOOTH EXTRACTION         Social History     Socioeconomic History   • Marital status:    Tobacco Use   • Smoking status: Never     Passive exposure: Never   • Smokeless tobacco: Never   Vaping Use   • Vaping Use: Never used   Substance and Sexual Activity   • Alcohol use: Not Currently   • Drug use: Never   • Sexual activity: Defer       Family History   Problem Relation Age of Onset   • Cancer Mother    • Pneumonia Sister         Allergies   Allergen Reactions   • Penicillins Swelling     Face and lips as a child    • Simvastatin Myalgia     \"weakness\"         Current Outpatient Medications   Medication Instructions   • Eliquis 5 MG tablet tablet TAKE ONE TABLET BY MOUTH TWICE A DAY   • ferrous sulfate 325 mg, Oral, Weekly, sundays   • fluticasone (FLONASE) 50 MCG/ACT nasal spray 2 sprays, Nasal, As Needed   • lansoprazole (PREVACID) 30 mg, Oral, Daily   • psyllium (METAMUCIL) 58.6 % packet 1 packet, Oral, Daily   • tamsulosin (FLOMAX) 0.4 mg, Oral, Daily       Vitals:    04/13/23 1414   BP: 98/66   BP Location: Left arm   Patient Position: Sitting   Cuff Size: Adult   Pulse: 84   Resp: 20   SpO2: 97%   Weight: 100 kg (221 lb)   Height: 195.6 cm (77.01\")     Body mass index is 26.2 kg/m².    PHYSICAL EXAM:    General Appearance:   · well developed  · well nourished  Neck:  · thyroid not enlarged  · supple  Respiratory:  · no respiratory distress  · normal " breath sounds  · no rales  Cardiovascular:  · no jugular venous distention  · regular rhythm  · apical impulse normal  · S1 normal, S2 normal  · no S3, no S4   · no murmur  · no rub, no thrill  · carotid pulses normal; no bruit  · pedal pulses normal  · lower extremity edema: none    Skin:   warm, dry    RESULTS:   Procedures    Results for orders placed during the hospital encounter of 11/22/22    Adult Transesophageal Echo (SENAIT) W/ Cont if Necessary Per Protocol    Interpretation Summary  •  Left ventricular systolic function is normal. Left ventricular ejection fraction appears to be 56 - 60%.  •  Calculated right ventricular systolic pressure from tricuspid regurgitation is 30 mmHg.  •  Amplatzer atrial septal occluder device with normal appearance and no residual leak.  •  Normal left atrial appendage with no evidence of thrombus.    I supervised and directed an independent trained observer with the assistance of monitoring the patient's level of consciousness and physiological status throughout the procedure.  Intraoperative service time of 25 minutes        Labs:  Lab Results   Component Value Date    CHOL 302 (H) 11/04/2022    TRIG 189 (H) 11/04/2022    HDL 35 (L) 11/04/2022     (H) 11/04/2022    AST 19 11/04/2022    ALT 22 11/04/2022     Lab Results   Component Value Date    HGBA1C 5.8 (H) 03/15/2023     Creatinine   Date Value Ref Range Status   02/09/2023 1.04 0.76 - 1.27 mg/dL Final   02/09/2023 1.20 0.60 - 1.30 mg/dL Final   01/03/2023 1.04 0.76 - 1.27 mg/dL Final   11/04/2022 1.21 0.76 - 1.27 mg/dL Final   11/23/2021 1.10 0.80 - 1.30 mg/dL Final   05/06/2020 1.10 0.60 - 1.30 mg/dL Final     Comment:     Serial Number: [id number]:  [id number]     eGFR Non  Am   Date Value Ref Range Status   11/23/2021 >60 >60 mL/min/1.73m*2 Final     Comment:     eGFR = estimated GFR; eGFR units = mL/min/1.73 sq meters Chronic Kidney Disease is considered if eGFR <60 mL/min/1.73 sq meters Kidney failure is  considered if eGFR is <15 mL/min/1.73 sq meters. eGFR assumes steady state plasma creatinine concentration; not applicable if renal function is rapidly changing or patient is on dialysis.     eGFR Non  Amer   Date Value Ref Range Status   05/06/2020 75 >60 mL/min/1.73 Final         ASSESSMENT:  Problem List Items Addressed This Visit        Cardiac and Vasculature    Paroxysmal atrial fibrillation - Primary    Overview     · 5/6/20: successful ECV  · Echo 4/19/21: EF 61 - 65%. Left ventricular systolic function is normal. No significant structural or functional valvular disease.  · MPS 4/26/2021:no evidence of ischemia.Left ventricular ejection fraction is hyperdynamic (Calculated EF > 70%).  · ECV 7/19/22:  • EPS, 1/4/2023 with Right antral circumferential pulmonary vein isolation. Extensive ablation involving wide antral circumferential pulmonary vein isolation left atrial roof left atrial floor entire left atrial posterior wall and base of left atrial appendage  • Successful DC cardioversion, 2/9/2023         Essential hypertension    Hyperlipidemia LDL goal <100    Congenital septal defect of heart    Overview     · Hx of ASD closure, in [place], approximately 2003  · SENAIT, 11/22/2022: Normal LV, EF 55-60%.  Amplatzer atrial septal occluder device with normal appearance and no residual leak.  Normal left atrial appendage            Coag and Thromboembolic    Chronic anticoagulation       PLAN:    1.  Persistent atrial fibrillation/atrial flutter:  Had PVA with Dr. Ruvalcaba 01/2023  Continue Eliquis   Doing well maintaining sinus rhythm feels better    2.    Hypotension:  Asymptomatic, no need for midodrine at this time  Increase hydration     3.  Hyperlipidemia:  Well-controlled, continue simvastatin 40 mg daily     4.  Decreased exercise tolerance:  Improving  Echocardiogram 4/19/21 LVEF 61-65%, no valvular disease  Stress test 4/19/21 no evidence of ischemia   Likely due to arrhythmia    5.  Preop  cardiovascular assessment:  Patient needs ERCP for common bile duct lesion and stricture  No restriction from cardiac standpoint he is low cardiovascular risk, okay to proceed, he has asymptomatic bradycardia that should not preclude anesthesia for this low cardiovascular risk procedure    Advance Care Planning   ACP discussion was held with the patient during this visit. Patient does not have an advance directive, information provided.          Follow-up   Return in about 9 months (around 1/13/2024).  Shaun Parker MD, FACC, [facility]  Interventional Cardiology

## 2023-04-13 ENCOUNTER — OFFICE VISIT (OUTPATIENT)
Dept: CARDIOLOGY | Facility: CLINIC | Age: 78
End: 2023-04-13
Payer: COMMERCIAL

## 2023-04-13 VITALS
RESPIRATION RATE: 20 BRPM | WEIGHT: 221 LBS | DIASTOLIC BLOOD PRESSURE: 66 MMHG | OXYGEN SATURATION: 97 % | BODY MASS INDEX: 26.09 KG/M2 | HEART RATE: 84 BPM | HEIGHT: 77 IN | SYSTOLIC BLOOD PRESSURE: 98 MMHG

## 2023-04-13 DIAGNOSIS — I10 ESSENTIAL HYPERTENSION: ICD-10-CM

## 2023-04-13 DIAGNOSIS — Z79.01 CHRONIC ANTICOAGULATION: ICD-10-CM

## 2023-04-13 DIAGNOSIS — E78.5 HYPERLIPIDEMIA LDL GOAL <100: ICD-10-CM

## 2023-04-13 DIAGNOSIS — I48.0 PAROXYSMAL ATRIAL FIBRILLATION: Primary | ICD-10-CM

## 2023-04-13 DIAGNOSIS — Q21.9 CONGENITAL SEPTAL DEFECT OF HEART: ICD-10-CM

## 2023-04-19 ENCOUNTER — TELEPHONE (OUTPATIENT)
Dept: CARDIOLOGY | Facility: CLINIC | Age: 78
End: 2023-04-19
Payer: COMMERCIAL

## 2023-04-19 NOTE — LETTER
04/19/23        Type of Procedure/Surgery - ERCP  Fax # - [phone]    Re:  Shaun Moustapha Tyler, 1945          Shaun Moustapha Tyler, 1945 is LOW RISK  for scheduled procedure from a cardiac standpoint.  Any questions please call [phone].          Sincerely,          MD Cheyenne Nixon RN

## 2023-04-19 NOTE — TELEPHONE ENCOUNTER
P: [phone] is requesting CC for ERCP. The office said he has not seen them yet. Has a consult appt with them June 15th. Per DIANNE VALENTIN 4/13/23 low risk. Letter faxed to F: [phone]

## 2023-04-24 ENCOUNTER — APPOINTMENT (OUTPATIENT)
Dept: GENERAL RADIOLOGY | Facility: HOSPITAL | Age: 78
End: 2023-04-24
Payer: COMMERCIAL

## 2023-04-24 ENCOUNTER — HOSPITAL ENCOUNTER (EMERGENCY)
Facility: HOSPITAL | Age: 78
Discharge: HOME | End: 2023-04-24
Attending: EMERGENCY MEDICINE | Admitting: EMERGENCY MEDICINE
Payer: COMMERCIAL

## 2023-04-24 ENCOUNTER — APPOINTMENT (OUTPATIENT)
Dept: CT IMAGING | Facility: HOSPITAL | Age: 78
End: 2023-04-24
Payer: COMMERCIAL

## 2023-04-24 VITALS
DIASTOLIC BLOOD PRESSURE: 100 MMHG | HEART RATE: 78 BPM | TEMPERATURE: 98.5 F | RESPIRATION RATE: 18 BRPM | WEIGHT: 217 LBS | BODY MASS INDEX: 25.62 KG/M2 | OXYGEN SATURATION: 95 % | SYSTOLIC BLOOD PRESSURE: 145 MMHG | HEIGHT: 77 IN

## 2023-04-24 DIAGNOSIS — R42 DIZZINESS: Primary | ICD-10-CM

## 2023-04-24 DIAGNOSIS — R06.00 DYSPNEA, UNSPECIFIED TYPE: ICD-10-CM

## 2023-04-24 LAB
ALBUMIN SERPL-MCNC: 3.8 G/DL (ref 3.5–5.2)
ALBUMIN/GLOB SERPL: 1.5 G/DL
ALP SERPL-CCNC: 127 U/L (ref 39–117)
ALT SERPL W P-5'-P-CCNC: 64 U/L (ref 1–41)
ANION GAP SERPL CALCULATED.3IONS-SCNC: 10 MMOL/L (ref 5–15)
AST SERPL-CCNC: 35 U/L (ref 1–40)
BACTERIA UR QL AUTO: ABNORMAL /HPF
BASOPHILS # BLD AUTO: 0.06 10*3/MM3 (ref 0–0.2)
BASOPHILS NFR BLD AUTO: 1.2 % (ref 0–1.5)
BILIRUB SERPL-MCNC: 0.5 MG/DL (ref 0–1.2)
BILIRUB UR QL STRIP: NEGATIVE
BUN SERPL-MCNC: 13 MG/DL (ref 8–23)
BUN/CREAT SERPL: 16.5 (ref 7–25)
CALCIUM SPEC-SCNC: 8.6 MG/DL (ref 8.6–10.5)
CHLORIDE SERPL-SCNC: 107 MMOL/L (ref 98–107)
CLARITY UR: CLEAR
CO2 SERPL-SCNC: 21 MMOL/L (ref 22–29)
COLOR UR: YELLOW
CREAT SERPL-MCNC: 0.79 MG/DL (ref 0.76–1.27)
DEPRECATED RDW RBC AUTO: 46.7 FL (ref 37–54)
EGFRCR SERPLBLD CKD-EPI 2021: 92.1 ML/MIN/1.73
EOSINOPHIL # BLD AUTO: 0.16 10*3/MM3 (ref 0–0.4)
EOSINOPHIL NFR BLD AUTO: 3.3 % (ref 0.3–6.2)
ERYTHROCYTE [DISTWIDTH] IN BLOOD BY AUTOMATED COUNT: 13.5 % (ref 12.3–15.4)
GLOBULIN UR ELPH-MCNC: 2.5 GM/DL
GLUCOSE SERPL-MCNC: 109 MG/DL (ref 65–99)
GLUCOSE UR STRIP-MCNC: NEGATIVE MG/DL
HCT VFR BLD AUTO: 42.7 % (ref 37.5–51)
HGB BLD-MCNC: 14.4 G/DL (ref 13–17.7)
HGB UR QL STRIP.AUTO: ABNORMAL
HOLD SPECIMEN: NORMAL
HYALINE CASTS UR QL AUTO: ABNORMAL /LPF
IMM GRANULOCYTES # BLD AUTO: 0.01 10*3/MM3 (ref 0–0.05)
IMM GRANULOCYTES NFR BLD AUTO: 0.2 % (ref 0–0.5)
KETONES UR QL STRIP: NEGATIVE
LEUKOCYTE ESTERASE UR QL STRIP.AUTO: NEGATIVE
LYMPHOCYTES # BLD AUTO: 1.7 10*3/MM3 (ref 0.7–3.1)
LYMPHOCYTES NFR BLD AUTO: 34.8 % (ref 19.6–45.3)
MAGNESIUM SERPL-MCNC: 2 MG/DL (ref 1.6–2.4)
MCH RBC QN AUTO: 31.6 PG (ref 26.6–33)
MCHC RBC AUTO-ENTMCNC: 33.7 G/DL (ref 31.5–35.7)
MCV RBC AUTO: 93.6 FL (ref 79–97)
MONOCYTES # BLD AUTO: 0.42 10*3/MM3 (ref 0.1–0.9)
MONOCYTES NFR BLD AUTO: 8.6 % (ref 5–12)
NEUTROPHILS NFR BLD AUTO: 2.53 10*3/MM3 (ref 1.7–7)
NEUTROPHILS NFR BLD AUTO: 51.9 % (ref 42.7–76)
NITRITE UR QL STRIP: NEGATIVE
NRBC BLD AUTO-RTO: 0 /100 WBC (ref 0–0.2)
PH UR STRIP.AUTO: 6.5 [PH] (ref 5–8)
PLATELET # BLD AUTO: 212 10*3/MM3 (ref 140–450)
PMV BLD AUTO: 9.5 FL (ref 6–12)
POTASSIUM SERPL-SCNC: 4.5 MMOL/L (ref 3.5–5.2)
PROT SERPL-MCNC: 6.3 G/DL (ref 6–8.5)
PROT UR QL STRIP: NEGATIVE
RBC # BLD AUTO: 4.56 10*6/MM3 (ref 4.14–5.8)
RBC # UR STRIP: ABNORMAL /HPF
REF LAB TEST METHOD: ABNORMAL
SODIUM SERPL-SCNC: 138 MMOL/L (ref 136–145)
SP GR UR STRIP: 1.01 (ref 1–1.03)
SQUAMOUS #/AREA URNS HPF: ABNORMAL /HPF
TROPONIN T SERPL HS-MCNC: 17 NG/L
UROBILINOGEN UR QL STRIP: ABNORMAL
WBC # UR STRIP: ABNORMAL /HPF
WBC NRBC COR # BLD: 4.88 10*3/MM3 (ref 3.4–10.8)
WHOLE BLOOD HOLD COAG: NORMAL
WHOLE BLOOD HOLD SPECIMEN: NORMAL

## 2023-04-24 PROCEDURE — 84484 ASSAY OF TROPONIN QUANT: CPT | Performed by: EMERGENCY MEDICINE

## 2023-04-24 PROCEDURE — 81001 URINALYSIS AUTO W/SCOPE: CPT | Performed by: EMERGENCY MEDICINE

## 2023-04-24 PROCEDURE — 93005 ELECTROCARDIOGRAM TRACING: CPT | Performed by: EMERGENCY MEDICINE

## 2023-04-24 PROCEDURE — 85025 COMPLETE CBC W/AUTO DIFF WBC: CPT | Performed by: EMERGENCY MEDICINE

## 2023-04-24 PROCEDURE — 71045 X-RAY EXAM CHEST 1 VIEW: CPT

## 2023-04-24 PROCEDURE — 70450 CT HEAD/BRAIN W/O DYE: CPT

## 2023-04-24 PROCEDURE — 80053 COMPREHEN METABOLIC PANEL: CPT | Performed by: EMERGENCY MEDICINE

## 2023-04-24 PROCEDURE — 99284 EMERGENCY DEPT VISIT MOD MDM: CPT

## 2023-04-24 PROCEDURE — 70496 CT ANGIOGRAPHY HEAD: CPT

## 2023-04-24 PROCEDURE — 83735 ASSAY OF MAGNESIUM: CPT | Performed by: EMERGENCY MEDICINE

## 2023-04-24 PROCEDURE — 71275 CT ANGIOGRAPHY CHEST: CPT

## 2023-04-24 PROCEDURE — 93005 ELECTROCARDIOGRAM TRACING: CPT

## 2023-04-24 PROCEDURE — 36415 COLL VENOUS BLD VENIPUNCTURE: CPT

## 2023-04-24 PROCEDURE — 25510000001 IOPAMIDOL PER 1 ML: Performed by: EMERGENCY MEDICINE

## 2023-04-24 PROCEDURE — 70498 CT ANGIOGRAPHY NECK: CPT

## 2023-04-24 RX ORDER — SODIUM CHLORIDE 0.9 % (FLUSH) 0.9 %
10 SYRINGE (ML) INJECTION AS NEEDED
Status: DISCONTINUED | OUTPATIENT
Start: 2023-04-24 | End: 2023-04-24 | Stop reason: HOSPADM

## 2023-04-24 RX ADMIN — IOPAMIDOL 125 ML: 755 INJECTION, SOLUTION INTRAVENOUS at 19:52

## 2023-04-24 NOTE — Clinical Note
[facility] EMERGENCY DEPARTMENT  [address] [address]  Phone: [phone]    Shaun Tyler was seen and treated in our emergency department on 4/24/2023.  He may return to work on 04/27/2023.         Thank you for choosing [facility].    Mil Oseguera MD

## 2023-04-24 NOTE — ED PROVIDER NOTES
Subjective   History of Present Illness  78-year-old male who presents for evaluation of dizziness.  The patient reports that he was riding on his 0 turn mower while he was mowing his yard roughly 2 to 3 hours prior to presentation.  He reports that he sellae began to feel dizzy while he was doing this.  He states that he was able to get off his 0 turn mower and ultimately staggered to to the door of his house and into his house.  He denies any trauma or injury to his head or neck.  He he denies focal weakness through his face, arms, or legs.  No previous history of stroke.  He does reported a previous history of A-fib and does take anticoagulation on a regular basis.  Denies any palpitations or elevated heart rate.  He reports having a recent detailed outpatient evaluation.  He reports that roughly 1 month ago he had hematuria, was evaluated his primary care physician, referred to the urology, and had a CT scan of the abdomen pelvis to evaluate his prostate.  He was identified to have potential common bile duct obstruction on that evaluation and ultimately he was referred to GI for outpatient evaluation.   He reports that an MRCP was performed and they ultimately desired to do an ERCP.  However, he was identified to have bradycardia into the 30s and therefore the GI provider would not proceed with the ERCP until he was cleared.  He reports that he subsequently had a follow-up with cardiology and they reportedly did not identify anything abnormal.  He is followed by Dr. Ruvalcaba of the electrophysiology service here at [facility] and followed by Dr. Parker as a general cardiologist.  In the meantime he actually was discontinued off Eliquis for a few days, which she takes for A-fib, and the hematuria actually resolved.  He has since been reinitiated on Eliquis and takes it on a regular basis without missing any doses.  He states that he admittedly did not drink much fluid the day because he was outside mowing.  He states  however it was not hot, he actually was dressed warm because the typically is in the 50s.  Upon arrival here he does state that he is drink fluids and does report feeling better.  He denies ever having any focal weakness to his face, arms, or legs.  He exhibits normal speech and answers all questions appropriate.  No previous history of DVT or PE.  No reported sick contacts.  No recent medication changes.  No other acute complaints.        Review of Systems   Constitutional: Negative for chills, fatigue and fever.   HENT: Negative for congestion, ear pain, postnasal drip, sinus pressure and sore throat.    Eyes: Negative for pain, redness and visual disturbance.   Respiratory: Positive for shortness of breath. Negative for cough and chest tightness.    Cardiovascular: Negative for chest pain, palpitations and leg swelling.   Gastrointestinal: Negative for abdominal pain, anal bleeding, blood in stool, diarrhea, nausea and vomiting.   Endocrine: Negative for polydipsia and polyuria.   Genitourinary: Negative for difficulty urinating, dysuria, frequency and urgency.   Musculoskeletal: Negative for arthralgias, back pain and neck pain.   Skin: Negative for pallor and rash.   Allergic/Immunologic: Negative for environmental allergies and immunocompromised state.   Neurological: Positive for dizziness. Negative for weakness and headaches.   Hematological: Negative for adenopathy.   Psychiatric/Behavioral: Negative for confusion, self-injury and suicidal ideas. The patient is not nervous/anxious.    All other systems reviewed and are negative.      Past Medical History:   Diagnosis Date   • A-fib    • Atrial septal defect    • Chicken pox    • GERD (gastroesophageal reflux disease)    • Hematuria, unspecified type    • Hiatal hernia    • [place] (hard of hearing)     does not wear his hearing aids   • Hyperbilirubinemia    • Measles    • Mumps    • S/P patch closure of atrial septal defect    • Sleep apnea     does not use  c-pa p   • Wears dentures        Allergies   Allergen Reactions   • Penicillins Swelling     Face and lips as a child        Past Surgical History:   Procedure Laterality Date   • ATRIAL SEPTAL DEFECT REPAIR  2005   • CARDIAC ELECTROPHYSIOLOGY PROCEDURE N/A 01/04/2023    Procedure: PVA (persistent), Rythmia poss STX, no meds to hold;  Surgeon: Escobar Ruvalcaba DO;  Location: [facility] INVASIVE LOCATION;  Service: Cardiovascular;  Laterality: N/A;   • CARDIOVERSION      11/4/2022 per Dr. Parker   • COLONOSCOPY     • ENDOSCOPY     • EXCISION / REPAIR HYDROCELE PEDIATRIC     • EYE SURGERY Bilateral     lasik and cataract   • TONSILLECTOMY     • WISDOM TOOTH EXTRACTION         Family History   Problem Relation Age of Onset   • Cancer Mother    • Pneumonia Sister        Social History     Socioeconomic History   • Marital status:    Tobacco Use   • Smoking status: Never     Passive exposure: Never   • Smokeless tobacco: Never   Vaping Use   • Vaping Use: Never used   Substance and Sexual Activity   • Alcohol use: Not Currently   • Drug use: Never   • Sexual activity: Defer           Objective   Physical Exam  Vitals and nursing note reviewed.   Constitutional:       General: He is not in acute distress.     Appearance: Normal appearance. He is well-developed. He is not toxic-appearing or diaphoretic.   HENT:      Head: Normocephalic and atraumatic.      Right Ear: External ear normal.      Left Ear: External ear normal.      Nose: Nose normal.   Eyes:      General: Lids are normal.      Pupils: Pupils are equal, round, and reactive to light.   Neck:      Trachea: No tracheal deviation.   Cardiovascular:      Rate and Rhythm: Normal rate and regular rhythm.      Pulses: No decreased pulses.      Heart sounds: Normal heart sounds. No murmur heard.    No friction rub. No gallop.   Pulmonary:      Effort: Pulmonary effort is normal. No respiratory distress.      Breath sounds: Normal breath sounds. No decreased breath  sounds, wheezing, rhonchi or rales.   Abdominal:      General: Bowel sounds are normal.      Palpations: Abdomen is soft.      Tenderness: There is no abdominal tenderness. There is no guarding or rebound.   Musculoskeletal:         General: No deformity. Normal range of motion.      Cervical back: Normal range of motion and neck supple.   Lymphadenopathy:      Cervical: No cervical adenopathy.   Skin:     General: Skin is warm and dry.      Findings: No rash.   Neurological:      Mental Status: He is alert and oriented to person, place, and time.      GCS: GCS eye subscore is 4. GCS verbal subscore is 5. GCS motor subscore is 6.      Cranial Nerves: No cranial nerve deficit.      Sensory: No sensory deficit.      Comments: No neurological deficits on exam.    GCS 15.    NIH stroke scale 0.   Psychiatric:         Speech: Speech normal.         Behavior: Behavior normal.         Thought Content: Thought content normal.         Judgment: Judgment normal.         Procedures           ED Course                                           Medical Decision Making  Differential diagnosis includes TIA, stroke, peripheral vertigo, dehydration, acute viral illness, pneumonia, pulmonary embolism, other unspecified etiology.    CT scan of the head without contrast, and CT angiogram of the head and neck are negative.  CT angiogram the chest likewise shows no acute abnormalities.    The patient's vital signs have remained stable and the patient overall has remained asymptomatic during the time that I have observed him here in the ER.  He has no focal neurological deficits on exam.    I discussed admission to the hospital and MRI evaluation the brain but the patient refuses at this given time.     In regards to his heart he is remained in a sinus rhythm, and he is appropriately taking anticoagulation due to paroxysmal A-fib.    I will refer the patient to cardiology for further outpatient evaluation of near syncopal/dizzy event.  He  will also be referred to neurology because he does not desire admission at this time.    Dizziness: acute illness or injury  Dyspnea, unspecified type: acute illness or injury  Amount and/or Complexity of Data Reviewed  Independent Historian: EMS  External Data Reviewed: labs, radiology, ECG and notes.  Labs: ordered.  Radiology: ordered.  ECG/medicine tests: ordered.      Risk  Prescription drug management.          Final diagnoses:   Dizziness   Dyspnea, unspecified type       ED Disposition  ED Disposition     ED Disposition   Discharge    Condition   Stable    Comment   --             Alejandro Richard PA-C  [address]   [address]  [facility] [id number] [phone]    In 1 week      [facility]  [address]  [place] [address] [phone]  Schedule an appointment as soon as possible for a visit       Zabrina Gold MD  [address]  [address]  [facility] [address] [phone]    Schedule an appointment as soon as possible for a visit            Medication List      Changed    Eliquis 5 MG tablet tablet  Generic drug: apixaban  TAKE ONE TABLET BY MOUTH TWICE A DAY  What changed:   · how much to take  · when to take this             Mil Oseguera MD  04/24/23 1167

## 2023-04-25 NOTE — DISCHARGE INSTRUCTIONS
Follow-up with cardiology and neurology for further outpatient evaluation.    Return to the ER with any further concern.

## 2023-04-26 LAB
QT INTERVAL: 476 MS
QTC INTERVAL: 438 MS

## 2023-04-28 ENCOUNTER — APPOINTMENT (OUTPATIENT)
Dept: GENERAL RADIOLOGY | Facility: HOSPITAL | Age: 78
End: 2023-04-28
Payer: COMMERCIAL

## 2023-04-28 ENCOUNTER — HOSPITAL ENCOUNTER (EMERGENCY)
Facility: HOSPITAL | Age: 78
Discharge: HOME | End: 2023-04-28
Attending: EMERGENCY MEDICINE
Payer: COMMERCIAL

## 2023-04-28 VITALS
SYSTOLIC BLOOD PRESSURE: 114 MMHG | OXYGEN SATURATION: 98 % | WEIGHT: 217 LBS | DIASTOLIC BLOOD PRESSURE: 90 MMHG | HEART RATE: 60 BPM | RESPIRATION RATE: 16 BRPM | TEMPERATURE: 98.5 F | BODY MASS INDEX: 26.42 KG/M2 | HEIGHT: 76 IN

## 2023-04-28 DIAGNOSIS — R42 DIZZINESS: Primary | ICD-10-CM

## 2023-04-28 DIAGNOSIS — I48.0 PAROXYSMAL ATRIAL FIBRILLATION: ICD-10-CM

## 2023-04-28 LAB
ALBUMIN SERPL-MCNC: 3.9 G/DL (ref 3.5–5.2)
ALBUMIN/GLOB SERPL: 1.4 G/DL
ALP SERPL-CCNC: 129 U/L (ref 39–117)
ALT SERPL W P-5'-P-CCNC: 37 U/L (ref 1–41)
ANION GAP SERPL CALCULATED.3IONS-SCNC: 14 MMOL/L (ref 5–15)
AST SERPL-CCNC: 23 U/L (ref 1–40)
BASOPHILS # BLD AUTO: 0.06 10*3/MM3 (ref 0–0.2)
BASOPHILS NFR BLD AUTO: 1.1 % (ref 0–1.5)
BILIRUB SERPL-MCNC: 0.5 MG/DL (ref 0–1.2)
BILIRUB UR QL STRIP: NEGATIVE
BUN SERPL-MCNC: 10 MG/DL (ref 8–23)
BUN/CREAT SERPL: 9.6 (ref 7–25)
CALCIUM SPEC-SCNC: 9.2 MG/DL (ref 8.6–10.5)
CHLORIDE SERPL-SCNC: 105 MMOL/L (ref 98–107)
CHOLEST SERPL-MCNC: 278 MG/DL (ref 0–200)
CLARITY UR: CLEAR
CO2 SERPL-SCNC: 19 MMOL/L (ref 22–29)
COLOR UR: YELLOW
CREAT SERPL-MCNC: 1.04 MG/DL (ref 0.76–1.27)
DEPRECATED RDW RBC AUTO: 45.9 FL (ref 37–54)
EGFRCR SERPLBLD CKD-EPI 2021: 74.4 ML/MIN/1.73
EOSINOPHIL # BLD AUTO: 0.11 10*3/MM3 (ref 0–0.4)
EOSINOPHIL NFR BLD AUTO: 2 % (ref 0.3–6.2)
ERYTHROCYTE [DISTWIDTH] IN BLOOD BY AUTOMATED COUNT: 13.6 % (ref 12.3–15.4)
GLOBULIN UR ELPH-MCNC: 2.8 GM/DL
GLUCOSE SERPL-MCNC: 79 MG/DL (ref 65–99)
GLUCOSE UR STRIP-MCNC: NEGATIVE MG/DL
HCT VFR BLD AUTO: 49 % (ref 37.5–51)
HDLC SERPL-MCNC: 36 MG/DL (ref 40–60)
HGB BLD-MCNC: 16.6 G/DL (ref 13–17.7)
HGB UR QL STRIP.AUTO: NEGATIVE
HOLD SPECIMEN: NORMAL
IMM GRANULOCYTES # BLD AUTO: 0.01 10*3/MM3 (ref 0–0.05)
IMM GRANULOCYTES NFR BLD AUTO: 0.2 % (ref 0–0.5)
KETONES UR QL STRIP: NEGATIVE
LDLC SERPL CALC-MCNC: 206 MG/DL (ref 0–100)
LDLC/HDLC SERPL: 5.69 {RATIO}
LEUKOCYTE ESTERASE UR QL STRIP.AUTO: NEGATIVE
LYMPHOCYTES # BLD AUTO: 1.45 10*3/MM3 (ref 0.7–3.1)
LYMPHOCYTES NFR BLD AUTO: 26.6 % (ref 19.6–45.3)
MAGNESIUM SERPL-MCNC: 2.2 MG/DL (ref 1.6–2.4)
MCH RBC QN AUTO: 31.4 PG (ref 26.6–33)
MCHC RBC AUTO-ENTMCNC: 33.9 G/DL (ref 31.5–35.7)
MCV RBC AUTO: 92.8 FL (ref 79–97)
MONOCYTES # BLD AUTO: 0.44 10*3/MM3 (ref 0.1–0.9)
MONOCYTES NFR BLD AUTO: 8.1 % (ref 5–12)
NEUTROPHILS NFR BLD AUTO: 3.39 10*3/MM3 (ref 1.7–7)
NEUTROPHILS NFR BLD AUTO: 62 % (ref 42.7–76)
NITRITE UR QL STRIP: NEGATIVE
NRBC BLD AUTO-RTO: 0 /100 WBC (ref 0–0.2)
PH UR STRIP.AUTO: 7 [PH] (ref 5–8)
PLATELET # BLD AUTO: 225 10*3/MM3 (ref 140–450)
PMV BLD AUTO: 9.5 FL (ref 6–12)
POTASSIUM SERPL-SCNC: 4.5 MMOL/L (ref 3.5–5.2)
PROT SERPL-MCNC: 6.7 G/DL (ref 6–8.5)
PROT UR QL STRIP: NEGATIVE
RBC # BLD AUTO: 5.28 10*6/MM3 (ref 4.14–5.8)
SODIUM SERPL-SCNC: 138 MMOL/L (ref 136–145)
SP GR UR STRIP: 1.01 (ref 1–1.03)
TRIGL SERPL-MCNC: 186 MG/DL (ref 0–150)
TROPONIN T SERPL HS-MCNC: 25 NG/L
UROBILINOGEN UR QL STRIP: NORMAL
VLDLC SERPL-MCNC: 36 MG/DL (ref 5–40)
WBC NRBC COR # BLD: 5.46 10*3/MM3 (ref 3.4–10.8)
WHOLE BLOOD HOLD COAG: NORMAL
WHOLE BLOOD HOLD SPECIMEN: NORMAL

## 2023-04-28 PROCEDURE — 81003 URINALYSIS AUTO W/O SCOPE: CPT | Performed by: EMERGENCY MEDICINE

## 2023-04-28 PROCEDURE — 99284 EMERGENCY DEPT VISIT MOD MDM: CPT | Performed by: PHYSICIAN ASSISTANT

## 2023-04-28 PROCEDURE — 80053 COMPREHEN METABOLIC PANEL: CPT | Performed by: EMERGENCY MEDICINE

## 2023-04-28 PROCEDURE — 93005 ELECTROCARDIOGRAM TRACING: CPT

## 2023-04-28 PROCEDURE — 83735 ASSAY OF MAGNESIUM: CPT | Performed by: EMERGENCY MEDICINE

## 2023-04-28 PROCEDURE — 99283 EMERGENCY DEPT VISIT LOW MDM: CPT

## 2023-04-28 PROCEDURE — 84484 ASSAY OF TROPONIN QUANT: CPT | Performed by: EMERGENCY MEDICINE

## 2023-04-28 PROCEDURE — 93005 ELECTROCARDIOGRAM TRACING: CPT | Performed by: EMERGENCY MEDICINE

## 2023-04-28 PROCEDURE — 36415 COLL VENOUS BLD VENIPUNCTURE: CPT

## 2023-04-28 PROCEDURE — 80061 LIPID PANEL: CPT | Performed by: PHYSICIAN ASSISTANT

## 2023-04-28 PROCEDURE — 85025 COMPLETE CBC W/AUTO DIFF WBC: CPT

## 2023-04-28 PROCEDURE — 71045 X-RAY EXAM CHEST 1 VIEW: CPT

## 2023-04-28 RX ORDER — SODIUM CHLORIDE 0.9 % (FLUSH) 0.9 %
10 SYRINGE (ML) INJECTION AS NEEDED
Status: DISCONTINUED | OUTPATIENT
Start: 2023-04-28 | End: 2023-04-28 | Stop reason: HOSPADM

## 2023-04-28 RX ORDER — ROSUVASTATIN CALCIUM 10 MG/1
10 TABLET, COATED ORAL NIGHTLY
Qty: 30 TABLET | Refills: 5 | Status: SHIPPED | OUTPATIENT
Start: 2023-04-28

## 2023-04-28 NOTE — Clinical Note
[facility] EMERGENCY DEPARTMENT  [address] [address]  Phone: [phone]    Shaun Tyler was seen and treated in our emergency department on 4/28/2023.  He may return to work on 05/02/2023.         Thank you for choosing [facility].    Mil Oseguera MD

## 2023-04-28 NOTE — Clinical Note
[facility] EMERGENCY DEPARTMENT  [address] [address]  Phone: [phone]    Shaun Tyler was seen and treated in our emergency department on 4/28/2023.  He may return to work on 05/02/2023.         Thank you for choosing [facility].    Mil Oseguera MD

## 2023-04-28 NOTE — Clinical Note
[facility] EMERGENCY DEPARTMENT  [address] [address]  Phone: [phone]    Shaun Tyler was seen and treated in our emergency department on 4/28/2023.  He may return to work on 05/02/2023.         Thank you for choosing [facility].    Mil Oseguera MD

## 2023-04-28 NOTE — ED PROVIDER NOTES
Subjective   History of Present Illness  78-year-old male who presents for evaluation of dizziness.  The patient reports that he was driving the day between 9 to 10 AM and had sudden onset of dizziness/lightheadedness.  He reports that he was able to drive himself here and he continued to have the dizziness until he arrived here.  He has a history of paroxysmal A-fib and his initial EKG did show A-fib with a rate of 99.  He reports after he began to rest in a chair here his symptoms are no longer present.  He denies chest pain.  He denies change in speech or focal weakness to his face, arms, or legs.  No recent fever or infectious symptoms.  No sick contacts.  No change in his medications.  He was seen here 4 days ago for a similar episode that occurred while he was mowing and then resolved prior to arrival.  He had a negative CT scan of the head without contrast, and CT angiogram of the head and neck.  He was identified to have sinus bradycardia at that given time.  He is baseline rhythm is sinus bradycardia and he has had this worked up in the past by the cardiology service.  He is followed by Dr. Parker.  No other acute complaints.        Review of Systems   Constitutional: Positive for fatigue. Negative for chills and fever.   HENT: Negative for congestion, ear pain, postnasal drip, sinus pressure and sore throat.    Eyes: Negative for pain, redness and visual disturbance.   Respiratory: Negative for cough, chest tightness and shortness of breath.    Cardiovascular: Negative for chest pain, palpitations and leg swelling.   Gastrointestinal: Negative for abdominal pain, anal bleeding, blood in stool, diarrhea, nausea and vomiting.   Endocrine: Negative for polydipsia and polyuria.   Genitourinary: Negative for difficulty urinating, dysuria, frequency and urgency.   Musculoskeletal: Negative for arthralgias, back pain and neck pain.   Skin: Negative for pallor and rash.   Allergic/Immunologic: Negative for  environmental allergies and immunocompromised state.   Neurological: Positive for dizziness and light-headedness. Negative for weakness and headaches.   Hematological: Negative for adenopathy.   Psychiatric/Behavioral: Negative for confusion, self-injury and suicidal ideas. The patient is not nervous/anxious.    All other systems reviewed and are negative.      Past Medical History:   Diagnosis Date   • A-fib    • Atrial septal defect    • Chicken pox    • GERD (gastroesophageal reflux disease)    • Hematuria, unspecified type    • Hiatal hernia    • [place] (hard of hearing)     does not wear his hearing aids   • Hyperbilirubinemia    • Measles    • Mumps    • S/P patch closure of atrial septal defect    • Sleep apnea     does not use c-pa p   • Wears dentures        Allergies   Allergen Reactions   • Penicillins Swelling     Face and lips as a child        Past Surgical History:   Procedure Laterality Date   • ATRIAL SEPTAL DEFECT REPAIR  2005   • CARDIAC ELECTROPHYSIOLOGY PROCEDURE N/A 01/04/2023    Procedure: PVA (persistent), Rythmia poss STX, no meds to hold;  Surgeon: Escobar Ruvalcaba DO;  Location: [facility] INVASIVE LOCATION;  Service: Cardiovascular;  Laterality: N/A;   • CARDIOVERSION      11/4/2022 per Dr. Parker   • COLONOSCOPY     • ENDOSCOPY     • EXCISION / REPAIR HYDROCELE PEDIATRIC     • EYE SURGERY Bilateral     lasik and cataract   • TONSILLECTOMY     • WISDOM TOOTH EXTRACTION         Family History   Problem Relation Age of Onset   • Cancer Mother    • Pneumonia Sister        Social History     Socioeconomic History   • Marital status:    Tobacco Use   • Smoking status: Never     Passive exposure: Never   • Smokeless tobacco: Never   Vaping Use   • Vaping Use: Never used   Substance and Sexual Activity   • Alcohol use: Not Currently   • Drug use: Never   • Sexual activity: Defer           Objective   Physical Exam  Vitals and nursing note reviewed.   Constitutional:       General: He is not in  acute distress.     Appearance: Normal appearance. He is well-developed. He is not toxic-appearing or diaphoretic.   HENT:      Head: Normocephalic and atraumatic.      Right Ear: External ear normal.      Left Ear: External ear normal.      Nose: Nose normal.   Eyes:      General: Lids are normal.      Pupils: Pupils are equal, round, and reactive to light.   Neck:      Trachea: No tracheal deviation.   Cardiovascular:      Rate and Rhythm: Normal rate and regular rhythm.      Pulses: No decreased pulses.      Heart sounds: Normal heart sounds. No murmur heard.    No friction rub. No gallop.   Pulmonary:      Effort: Pulmonary effort is normal. No respiratory distress.      Breath sounds: Normal breath sounds. No decreased breath sounds, wheezing, rhonchi or rales.   Abdominal:      General: Bowel sounds are normal.      Palpations: Abdomen is soft.      Tenderness: There is no abdominal tenderness. There is no guarding or rebound.   Musculoskeletal:         General: No deformity. Normal range of motion.      Cervical back: Normal range of motion and neck supple.   Lymphadenopathy:      Cervical: No cervical adenopathy.   Skin:     General: Skin is warm and dry.      Findings: No rash.   Neurological:      Mental Status: He is alert and oriented to person, place, and time.      Cranial Nerves: No cranial nerve deficit.      Sensory: No sensory deficit.   Psychiatric:         Speech: Speech normal.         Behavior: Behavior normal.         Thought Content: Thought content normal.         Judgment: Judgment normal.         Procedures           ED Course  ED Course as of 04/29/23 1922   Sat Apr 29, 2023   4487 Differential duct [NS]      ED Course User Index  [NS] Mil Oseguera MD                                           Medical Decision Making  Differential diagnosis includes A-fib with RVR, dysrhythmia, dehydration, TIA, neurocardiogenic syncope.    The patient was identified to be in A-fib upon initial  arrival but converted out of that on his own.  Upon arrival he had the symptoms but denies any symptoms currently.  He has a known underlying history of A-fib and takes anticoagulation.    Lab evaluation shows a troponin that is within the patient's previous expected range.  No other significant acute lab abnormalities.    Please note a lipid panel was ordered by the cardiology service which does show elevated LDL, triglycerides, and total cholesterol.    I am concerned the patient's A-fib may be accounting for these current episodes of lightheadedness/dizziness    The patient was eval by cardiology who will discharge the patient with [facility] in place and outpatient follow-up.    Dizziness: acute illness or injury  Paroxysmal atrial fibrillation: acute illness or injury  Amount and/or Complexity of Data Reviewed  External Data Reviewed: labs, radiology, ECG and notes.  Labs: ordered. Decision-making details documented in ED Course.  Radiology: ordered and independent interpretation performed.  ECG/medicine tests: ordered and independent interpretation performed.          Final diagnoses:   Dizziness   Paroxysmal atrial fibrillation       ED Disposition  ED Disposition     ED Disposition   Discharge    Condition   Stable    Comment   --             No follow-up provider specified.       Medication List      New Prescriptions    rosuvastatin 10 MG tablet  Commonly known as: CRESTOR  Take 1 tablet by mouth Every Night.        Changed    Eliquis 5 MG tablet tablet  Generic drug: apixaban  TAKE ONE TABLET BY MOUTH TWICE A DAY  What changed:   · how much to take  · when to take this           Where to Get Your Medications      These medications were sent to [facility] [id number] - [place], [place] - [address] [phone] [facility] [phone] FX  [address] [address]    Phone: [phone]   · rosuvastatin 10 MG tablet          Mil Oseguera MD  04/29/23 1922

## 2023-04-28 NOTE — CONSULTS
[facility] at [facility]  CARDIOLOGY CONSULTATION NOTE    Shaun Tyler  : 1945  MRN:[id number]  Home Phone:[phone]    Date of Admission:2023  Date of Consultation: 23    PCP: Alejandro Richard PA-C    IDENTIFICATION: A 78 y.o. male resident of [place], [place]     Chief Complaint   Patient presents with   • Dizziness       PROBLEM LIST:   Active Hospital Problems    Diagnosis  POA   • **Dizziness [R42]  Yes   • Chronic anticoagulation [Z79.01]  Not Applicable   • NATHAN (obstructive sleep apnea) [G47.33]  Yes   • Paroxysmal atrial fibrillation [I48.0]  Yes     · 20: successful ECV  · Echo 21: EF 61 - 65%. Left ventricular systolic function is normal. No significant structural or functional valvular disease.  · MPS 2021:no evidence of ischemia.Left ventricular ejection fraction is hyperdynamic (Calculated EF > 70%).  · ECV 22:  • EPS, 2023 with Right antral circumferential pulmonary vein isolation. Extensive ablation involving wide antral circumferential pulmonary vein isolation left atrial roof left atrial floor entire left atrial posterior wall and base of left atrial appendage  • Successful DC cardioversion, 2023     • Essential hypertension [I10]  Yes   • Hyperlipidemia LDL goal <100 [E78.5]  Yes   • Congenital septal defect of heart [Q21.9]  Not Applicable     · Hx of ASD closure, in [place], approximately   · SENAIT, 2022: Normal LV, EF 55-60%.  Amplatzer atrial septal occluder device with normal appearance and no residual leak.  Normal left atrial appendage       ALLERGIES:   Allergies   Allergen Reactions   • Penicillins Swelling     Face and lips as a child        HOME MEDICINES:   Current Outpatient Medications   Medication Instructions   • Eliquis 5 MG tablet tablet TAKE ONE TABLET BY MOUTH TWICE A DAY   • ferrous sulfate 325 mg, Oral, Weekly, sundays   • fluticasone (FLONASE) 50 MCG/ACT nasal spray 2 sprays, Nasal, As  "Needed   • lansoprazole (PREVACID) 30 mg, Oral, Daily   • psyllium (METAMUCIL) 58.6 % packet 1 packet, Oral, Daily   • tamsulosin (FLOMAX) 0.4 mg, Oral, Daily       HPI: Mr. Tyler is a 79 y/o male with PAF, history of ASD repair, HTN, HLD and recent diagnosis of common bile duct stricture evaluated at  who is seen in consultation for dizziness. He presented to the ER for the same symptoms 4 days ago, got dizzy while riding his lawnmower and was stumbling back to his house. CT head, CTA head and neck were negative at that time, as well as CTA of the chest. He had recent PVA for Afib in January with Dr. Ruvalcaba, currently on Eliquis and no anti-arrhythmics or AVN agents. He felt dizzy again this morning after waking up, he reports BP was labile but within current guidelines, HRs were in the 50s at home. He drove to do an errand and felt \"in another world,\" and unsafe, so he proceed to drive himself to the ER.  On arrival to the ER today, he was found to be in Afib, rates 99 bpm.  Currently in sinus rhythm with rates 60s. He reports the episodes are very sudden in nature. EMS sheet from 4/24 reviewed and notable for administration of IV Atropine 0.5mg x2 for HRs 38-40s with improvement in HR. He denies roberto carlos syncope. He had a recent echo at  4/7 which showed EF normal, no pericardial effusion. He stays hydrated.       ROS: All systems have been reviewed and are negative with the exception of those mentioned in the HPI and problem list above.    Surgical History:   Past Surgical History:   Procedure Laterality Date   • ATRIAL SEPTAL DEFECT REPAIR  2005   • CARDIAC ELECTROPHYSIOLOGY PROCEDURE N/A 01/04/2023    Procedure: PVA (persistent), Rythmia poss STX, no meds to hold;  Surgeon: Escobar Ruvalcaba DO;  Location: [facility] INVASIVE LOCATION;  Service: Cardiovascular;  Laterality: N/A;   • CARDIOVERSION      11/4/2022 per Dr. Parker   • COLONOSCOPY     • ENDOSCOPY     • EXCISION / REPAIR HYDROCELE PEDIATRIC     • EYE " "SURGERY Bilateral     lasik and cataract   • TONSILLECTOMY     • WISDOM TOOTH EXTRACTION         Social History:   Social History     Socioeconomic History   • Marital status:    Tobacco Use   • Smoking status: Never     Passive exposure: Never   • Smokeless tobacco: Never   Vaping Use   • Vaping Use: Never used   Substance and Sexual Activity   • Alcohol use: Not Currently   • Drug use: Never   • Sexual activity: Defer       Family History:   Family History   Problem Relation Age of Onset   • Cancer Mother    • Pneumonia Sister        Objective     /97 (BP Location: Left arm, Patient Position: Sitting)   Pulse 63   Temp 98.5 °F (36.9 °C) (Oral)   Resp 16   Ht 193 cm (76\")   Wt 98.4 kg (217 lb)   SpO2 98%   BMI 26.41 kg/m²   No intake or output data in the 24 hours ending 04/28/23 1356    PHYSICAL EXAM:  CONSTITUTIONAL: Well nourished, cooperative, in no acute distress  HEENT: Normocephalic, atraumatic, PERRLA, no JVD  CARDIOVASCULAR:  Regular rhythm and normal rate, no murmur, distant heart tones   RESPIRATORY: Clear to auscultation, normal respiratory effort, no wheezing, rales or rhonchi  GI: Soft, nontender, normal bowel sounds  EXTREMITIES: No gross deformities, no edema.   SKIN: Warm, dry. No bleeding, bruising or rash  NEUROLOGICAL: No focal deficits  PSYCHIATRIC: Normal mood and affect. Behavior is normal     Labs/Diagnostic Data  Results from last 7 days   Lab Units 04/28/23  1139 04/24/23  1628   SODIUM mmol/L 138 138   POTASSIUM mmol/L 4.5 4.5   CHLORIDE mmol/L 105 107   CO2 mmol/L 19.0* 21.0*   BUN mg/dL 10 13   CREATININE mg/dL 1.04 0.79   GLUCOSE mg/dL 79 109*   CALCIUM mg/dL 9.2 8.6     Results from last 7 days   Lab Units 04/28/23  1139 04/24/23  1628   HSTROP T ng/L 25* 17*     Results from last 7 days   Lab Units 04/28/23  1139 04/24/23  1628   WBC 10*3/mm3 5.46 4.88   HEMOGLOBIN g/dL 16.6 14.4   HEMATOCRIT % 49.0 42.7   PLATELETS 10*3/mm3 225 212     Results from last 7 days "   Lab Units 04/28/23  1139   MAGNESIUM mg/dL 2.2           I personally reviewed the patient's EKG/Telemetry data    Radiology Data:   CXR 4/28/23:  IMPRESSION:  Impression:  No acute cardiopulmonary abnormality is identified.      Assessment and Plan:     1. Dizziness  - recent ER visit 4/24 for the same, negative CT head, CTA head and neck, CTA chest; he was administered IV Atropine 0.5mg by EMS on 4/24 due to HRs 38-40s.   - he was in Afib with rates 99bpm on arrival today, spontaneously converted to SR with rates currently in 60s.   - labs unremarkable aside for CO2 of 19   - will place MCOT today for further evaluation of bradycardia episodes as he may have some intermittent Afib with post-conversion pauses  - continue to avoid AVN agents and maintain adequate hydration     2. PAF  - s/p PVA ablation with Dr. Ruvalcaba 01/2023  - chronically anticoagulated with Eliquis     3. Hyperlipidemia   -  with  by lipid panel 11/2022  - was previously on Simvastatin but stopped due to myalgias  - open to trial of different statin  - will send in Crestor 10mg nightly to his pharmacy     4. Enlarged CBD/ stricture  - evaluated at , concern for lesion  - has follow up arranged at     Discussed with Dr. Menjivar. Patient may be discharged home with MCOT in place.     Scribed for Skinny Menjivar MD by Norma Ocampo PA-C. 4/28/2023  14:51 EDT

## 2023-04-30 ENCOUNTER — APPOINTMENT (OUTPATIENT)
Dept: CT IMAGING | Facility: HOSPITAL | Age: 78
End: 2023-04-30
Payer: COMMERCIAL

## 2023-04-30 ENCOUNTER — HOSPITAL ENCOUNTER (EMERGENCY)
Facility: HOSPITAL | Age: 78
Discharge: HOME | End: 2023-04-30
Attending: EMERGENCY MEDICINE | Admitting: EMERGENCY MEDICINE
Payer: COMMERCIAL

## 2023-04-30 ENCOUNTER — APPOINTMENT (OUTPATIENT)
Dept: GENERAL RADIOLOGY | Facility: HOSPITAL | Age: 78
End: 2023-04-30
Payer: COMMERCIAL

## 2023-04-30 VITALS
SYSTOLIC BLOOD PRESSURE: 136 MMHG | BODY MASS INDEX: 26.18 KG/M2 | RESPIRATION RATE: 18 BRPM | HEIGHT: 76 IN | WEIGHT: 215 LBS | HEART RATE: 85 BPM | DIASTOLIC BLOOD PRESSURE: 99 MMHG | OXYGEN SATURATION: 97 % | TEMPERATURE: 98 F

## 2023-04-30 DIAGNOSIS — R10.13 EPIGASTRIC DISCOMFORT: ICD-10-CM

## 2023-04-30 DIAGNOSIS — R79.89 ELEVATED LIVER FUNCTION TESTS: ICD-10-CM

## 2023-04-30 DIAGNOSIS — R07.89 CHEST DISCOMFORT: Primary | ICD-10-CM

## 2023-04-30 LAB
ALBUMIN SERPL-MCNC: 3.8 G/DL (ref 3.5–5.2)
ALBUMIN/GLOB SERPL: 1.6 G/DL
ALP SERPL-CCNC: 152 U/L (ref 39–117)
ALT SERPL W P-5'-P-CCNC: 130 U/L (ref 1–41)
ANION GAP SERPL CALCULATED.3IONS-SCNC: 11 MMOL/L (ref 5–15)
AST SERPL-CCNC: 243 U/L (ref 1–40)
BASOPHILS # BLD AUTO: 0.03 10*3/MM3 (ref 0–0.2)
BASOPHILS NFR BLD AUTO: 0.4 % (ref 0–1.5)
BILIRUB SERPL-MCNC: 1.1 MG/DL (ref 0–1.2)
BUN SERPL-MCNC: 12 MG/DL (ref 8–23)
BUN/CREAT SERPL: 14.1 (ref 7–25)
CALCIUM SPEC-SCNC: 8.5 MG/DL (ref 8.6–10.5)
CHLORIDE SERPL-SCNC: 103 MMOL/L (ref 98–107)
CO2 SERPL-SCNC: 20 MMOL/L (ref 22–29)
CREAT SERPL-MCNC: 0.85 MG/DL (ref 0.76–1.27)
DEPRECATED RDW RBC AUTO: 46.8 FL (ref 37–54)
EGFRCR SERPLBLD CKD-EPI 2021: 90.1 ML/MIN/1.73
EOSINOPHIL # BLD AUTO: 0.12 10*3/MM3 (ref 0–0.4)
EOSINOPHIL NFR BLD AUTO: 1.6 % (ref 0.3–6.2)
ERYTHROCYTE [DISTWIDTH] IN BLOOD BY AUTOMATED COUNT: 13.6 % (ref 12.3–15.4)
GEN 5 2HR TROPONIN T REFLEX: 17 NG/L
GLOBULIN UR ELPH-MCNC: 2.4 GM/DL
GLUCOSE SERPL-MCNC: 95 MG/DL (ref 65–99)
HCT VFR BLD AUTO: 46.4 % (ref 37.5–51)
HGB BLD-MCNC: 15.8 G/DL (ref 13–17.7)
HOLD SPECIMEN: NORMAL
IMM GRANULOCYTES # BLD AUTO: 0.04 10*3/MM3 (ref 0–0.05)
IMM GRANULOCYTES NFR BLD AUTO: 0.5 % (ref 0–0.5)
LIPASE SERPL-CCNC: 27 U/L (ref 13–60)
LYMPHOCYTES # BLD AUTO: 1.34 10*3/MM3 (ref 0.7–3.1)
LYMPHOCYTES NFR BLD AUTO: 18.4 % (ref 19.6–45.3)
MCH RBC QN AUTO: 32 PG (ref 26.6–33)
MCHC RBC AUTO-ENTMCNC: 34.1 G/DL (ref 31.5–35.7)
MCV RBC AUTO: 93.9 FL (ref 79–97)
MONOCYTES # BLD AUTO: 0.42 10*3/MM3 (ref 0.1–0.9)
MONOCYTES NFR BLD AUTO: 5.8 % (ref 5–12)
NEUTROPHILS NFR BLD AUTO: 5.34 10*3/MM3 (ref 1.7–7)
NEUTROPHILS NFR BLD AUTO: 73.3 % (ref 42.7–76)
NRBC BLD AUTO-RTO: 0 /100 WBC (ref 0–0.2)
NT-PROBNP SERPL-MCNC: 154.4 PG/ML (ref 0–1800)
PLATELET # BLD AUTO: 211 10*3/MM3 (ref 140–450)
PMV BLD AUTO: 9.9 FL (ref 6–12)
POTASSIUM SERPL-SCNC: 4.1 MMOL/L (ref 3.5–5.2)
PROT SERPL-MCNC: 6.2 G/DL (ref 6–8.5)
RBC # BLD AUTO: 4.94 10*6/MM3 (ref 4.14–5.8)
SODIUM SERPL-SCNC: 134 MMOL/L (ref 136–145)
TROPONIN T DELTA: -2 NG/L
TROPONIN T SERPL HS-MCNC: 19 NG/L
WBC NRBC COR # BLD: 7.29 10*3/MM3 (ref 3.4–10.8)
WHOLE BLOOD HOLD COAG: NORMAL
WHOLE BLOOD HOLD SPECIMEN: NORMAL

## 2023-04-30 PROCEDURE — 96374 THER/PROPH/DIAG INJ IV PUSH: CPT

## 2023-04-30 PROCEDURE — 84484 ASSAY OF TROPONIN QUANT: CPT | Performed by: EMERGENCY MEDICINE

## 2023-04-30 PROCEDURE — 25010000002 ONDANSETRON PER 1 MG: Performed by: EMERGENCY MEDICINE

## 2023-04-30 PROCEDURE — 99284 EMERGENCY DEPT VISIT MOD MDM: CPT

## 2023-04-30 PROCEDURE — 36415 COLL VENOUS BLD VENIPUNCTURE: CPT

## 2023-04-30 PROCEDURE — 83880 ASSAY OF NATRIURETIC PEPTIDE: CPT | Performed by: EMERGENCY MEDICINE

## 2023-04-30 PROCEDURE — 71045 X-RAY EXAM CHEST 1 VIEW: CPT

## 2023-04-30 PROCEDURE — 74176 CT ABD & PELVIS W/O CONTRAST: CPT

## 2023-04-30 PROCEDURE — 85025 COMPLETE CBC W/AUTO DIFF WBC: CPT | Performed by: EMERGENCY MEDICINE

## 2023-04-30 PROCEDURE — 83690 ASSAY OF LIPASE: CPT | Performed by: EMERGENCY MEDICINE

## 2023-04-30 PROCEDURE — 96375 TX/PRO/DX INJ NEW DRUG ADDON: CPT

## 2023-04-30 PROCEDURE — 80053 COMPREHEN METABOLIC PANEL: CPT | Performed by: EMERGENCY MEDICINE

## 2023-04-30 PROCEDURE — 93005 ELECTROCARDIOGRAM TRACING: CPT | Performed by: EMERGENCY MEDICINE

## 2023-04-30 RX ORDER — ASPIRIN 81 MG/1
324 TABLET, CHEWABLE ORAL ONCE
Status: DISCONTINUED | OUTPATIENT
Start: 2023-04-30 | End: 2023-04-30 | Stop reason: HOSPADM

## 2023-04-30 RX ORDER — ONDANSETRON 2 MG/ML
4 INJECTION INTRAMUSCULAR; INTRAVENOUS ONCE
Status: COMPLETED | OUTPATIENT
Start: 2023-04-30 | End: 2023-04-30

## 2023-04-30 RX ORDER — FAMOTIDINE 10 MG/ML
20 INJECTION, SOLUTION INTRAVENOUS ONCE
Status: COMPLETED | OUTPATIENT
Start: 2023-04-30 | End: 2023-04-30

## 2023-04-30 RX ORDER — SODIUM CHLORIDE 0.9 % (FLUSH) 0.9 %
10 SYRINGE (ML) INJECTION AS NEEDED
Status: DISCONTINUED | OUTPATIENT
Start: 2023-04-30 | End: 2023-04-30 | Stop reason: HOSPADM

## 2023-04-30 RX ADMIN — ONDANSETRON 4 MG: 2 INJECTION INTRAMUSCULAR; INTRAVENOUS at 04:58

## 2023-04-30 RX ADMIN — FAMOTIDINE 20 MG: 10 INJECTION INTRAVENOUS at 04:58

## 2023-04-30 RX ADMIN — ALUMINUM HYDROXIDE, MAGNESIUM HYDROXIDE, AND DIMETHICONE: 400; 400; 40 SUSPENSION ORAL at 04:58

## 2023-04-30 NOTE — ED PROVIDER NOTES
Subjective   History of Present Illness  78-year-old male brought by EMS with concerns about chest discomfort and epigastric discomfort which started shortly prior to arrival.  Associated insomnia and patient felt like his heart rate was fast.  He is wearing a 30-day cardiac monitor, and was seen in our emergency department twice within the past week.  He was seen by cardiology in consultation 2 days ago and given the cardiac monitor.  He had 2 episodes of dizziness described as near syncope of sudden onset on 4/23 and 4/28.  Patient is compliant with Eliquis and Prevacid.  He started rosuvastatin yesterday.  Patient has a history of paroxysmal atrial fibrillation.  Patient has an abnormal common bile duct problems seen on MRCP, malignancy versus stricture.  He was seen by GI and was going to have an ERCP, but had an episode of low heart rate into the 30s, and ERCP was put off until cardiology cleared the patient.  He now has a follow-up appointment with GI at  on 6/15/2023 for ERCP.  Patient reports associated nausea, but denies recent vomiting.  Associated malaise.  Denies recent fever.  EMS reported initial heart rate 105 bpm, but reports that it came down to the mid 80s on the way to the hospital.  EMS did not give any medications prior to arrival.            Past Medical History:   Diagnosis Date   • A-fib    • Atrial septal defect    • Chicken pox    • GERD (gastroesophageal reflux disease)    • Hematuria, unspecified type    • Hiatal hernia    • [place] (hard of hearing)     does not wear his hearing aids   • Hyperbilirubinemia    • Measles    • Mumps    • S/P patch closure of atrial septal defect    • Sleep apnea     does not use c-pa p   • Wears dentures        Allergies   Allergen Reactions   • Penicillins Swelling     Face and lips as a child        Past Surgical History:   Procedure Laterality Date   • ATRIAL SEPTAL DEFECT REPAIR  2005   • CARDIAC ELECTROPHYSIOLOGY PROCEDURE N/A 01/04/2023    Procedure:  PVA (persistent), Rythmia poss STX, no meds to hold;  Surgeon: Escobar Ruvalcaba DO;  Location: [facility] INVASIVE LOCATION;  Service: Cardiovascular;  Laterality: N/A;   • CARDIOVERSION      11/4/2022 per Dr. Parker   • COLONOSCOPY     • ENDOSCOPY     • EXCISION / REPAIR HYDROCELE PEDIATRIC     • EYE SURGERY Bilateral     lasik and cataract   • TONSILLECTOMY     • WISDOM TOOTH EXTRACTION         Family History   Problem Relation Age of Onset   • Cancer Mother    • Pneumonia Sister        Social History     Socioeconomic History   • Marital status:    Tobacco Use   • Smoking status: Never     Passive exposure: Never   • Smokeless tobacco: Never   Vaping Use   • Vaping Use: Never used   Substance and Sexual Activity   • Alcohol use: Not Currently   • Drug use: Never   • Sexual activity: Defer           Objective   Physical Exam  Vitals and nursing note reviewed.   Constitutional:       General: He is not in acute distress.     Appearance: He is not diaphoretic.   HENT:      Head: Normocephalic and atraumatic.      Mouth/Throat:      Comments: Moist mucosa without pallor  Eyes:      General: No scleral icterus.     Comments: No photophobia   Neck:      Comments: No meningismus  Cardiovascular:      Rate and Rhythm: Normal rate and regular rhythm.      Heart sounds: No murmur heard.    No friction rub. No gallop.      Comments: S1, S2.  Pulmonary:      Effort: Pulmonary effort is normal.      Breath sounds: No stridor.      Comments: No respiratory distress, good air entry.  No wheezing or rales.  Clear to auscultation bilaterally.  Abdominal:      Palpations: Abdomen is soft.      Tenderness: There is abdominal tenderness.      Comments: Nondistended.  Epigastric tenderness to palpation, abdomen otherwise nontender to palpation throughout.  No guarding or rebound.   Musculoskeletal:      Cervical back: Neck supple.      Comments: No significant peripheral edema, no calf tenderness bilaterally.  No deformity.    Skin:     General: Skin is warm and dry.      Comments: No diaphoresis or pallor.   Neurological:      Mental Status: He is alert.      Comments: Normal speech, no dysarthria.  Extraocular movements intact, no nystagmus.  No dysmetria or past-pointing on finger-to-nose testing.  Rapid alternating movements within normal limits.  No facial droop.         Procedures           ED Course  ED Course as of 04/30/23 0815   Sun Apr 30, 2023   0812 Unremarkable ED course.  Patient is feeling better upon reevaluation, nausea has improved. [LD]      ED Course User Index  [LD] María Kerr MD                      HEART Score: 3                      Medical Decision Making  Differential diagnosis includes paroxysmal atrial fibrillation, resolved.  Differential diagnosis includes paroxysmal SVT, resolved.  Differential diagnosis includes common bile duct malignancy versus stricture, versus elevated liver function tests due to rosuvastatin less likely.  Low clinical suspicion for viral hepatitis.  Low clinical suspicion for acute coronary syndrome.  Differential diagnosis includes gastritis, peptic ulcer disease, anxiety.    Chest discomfort: complicated acute illness or injury  Elevated liver function tests: complicated acute illness or injury  Epigastric discomfort: complicated acute illness or injury  Amount and/or Complexity of Data Reviewed  Independent Historian: EMS  External Data Reviewed: labs, radiology and notes.     Details: Prior MRCP result from  reviewed.  Prior liver function tests reviewed.  Prior emergency department notes and cardiology consultation from 4/28/2023 reviewed.  Labs: ordered. Decision-making details documented in ED Course.  Radiology: ordered. Decision-making details documented in ED Course.  ECG/medicine tests: ordered. Decision-making details documented in ED Course.     Details: EKG at 0409 shows sinus rhythm with first-degree AV block at a rate of 69 bpm.  No acute ischemic  changes.      Risk  OTC drugs.  Prescription drug management.        Recent Results (from the past 24 hour(s))   ECG 12 Lead ED Triage Standing Order; Chest Pain    Collection Time: 04/30/23  4:09 AM   Result Value Ref Range    QT Interval 410 ms    QTC Interval 439 ms   High Sensitivity Troponin T    Collection Time: 04/30/23  4:16 AM    Specimen: Blood   Result Value Ref Range    HS Troponin T 19 (H) <15 ng/L   Comprehensive Metabolic Panel    Collection Time: 04/30/23  4:16 AM    Specimen: Blood   Result Value Ref Range    Glucose 95 65 - 99 mg/dL    BUN 12 8 - 23 mg/dL    Creatinine 0.85 0.76 - 1.27 mg/dL    Sodium 134 (L) 136 - 145 mmol/L    Potassium 4.1 3.5 - 5.2 mmol/L    Chloride 103 98 - 107 mmol/L    CO2 20.0 (L) 22.0 - 29.0 mmol/L    Calcium 8.5 (L) 8.6 - 10.5 mg/dL    Total Protein 6.2 6.0 - 8.5 g/dL    Albumin 3.8 3.5 - 5.2 g/dL    ALT (SGPT) 130 (H) 1 - 41 U/L    AST (SGOT) 243 (H) 1 - 40 U/L    Alkaline Phosphatase 152 (H) 39 - 117 U/L    Total Bilirubin 1.1 0.0 - 1.2 mg/dL    Globulin 2.4 gm/dL    A/G Ratio 1.6 g/dL    BUN/Creatinine Ratio 14.1 7.0 - 25.0    Anion Gap 11.0 5.0 - 15.0 mmol/L    eGFR 90.1 >60.0 mL/min/1.73   Lipase    Collection Time: 04/30/23  4:16 AM    Specimen: Blood   Result Value Ref Range    Lipase 27 13 - 60 U/L   BNP    Collection Time: 04/30/23  4:16 AM    Specimen: Blood   Result Value Ref Range    proBNP 154.4 0.0 - 1,800.0 pg/mL   Green Top (Gel)    Collection Time: 04/30/23  4:16 AM   Result Value Ref Range    Extra Tube Hold for add-ons.    Lavender Top    Collection Time: 04/30/23  4:16 AM   Result Value Ref Range    Extra Tube hold for add-on    Gold Top - SST    Collection Time: 04/30/23  4:16 AM   Result Value Ref Range    Extra Tube Hold for add-ons.    Light Blue Top    Collection Time: 04/30/23  4:16 AM   Result Value Ref Range    Extra Tube Hold for add-ons.    CBC Auto Differential    Collection Time: 04/30/23  4:16 AM    Specimen: Blood   Result Value Ref  Range    WBC 7.29 3.40 - 10.80 10*3/mm3    RBC 4.94 4.14 - 5.80 10*6/mm3    Hemoglobin 15.8 13.0 - 17.7 g/dL    Hematocrit 46.4 37.5 - 51.0 %    MCV 93.9 79.0 - 97.0 fL    MCH 32.0 26.6 - 33.0 pg    MCHC 34.1 31.5 - 35.7 g/dL    RDW 13.6 12.3 - 15.4 %    RDW-SD 46.8 37.0 - 54.0 fl    MPV 9.9 6.0 - 12.0 fL    Platelets 211 140 - 450 10*3/mm3    Neutrophil % 73.3 42.7 - 76.0 %    Lymphocyte % 18.4 (L) 19.6 - 45.3 %    Monocyte % 5.8 5.0 - 12.0 %    Eosinophil % 1.6 0.3 - 6.2 %    Basophil % 0.4 0.0 - 1.5 %    Immature Grans % 0.5 0.0 - 0.5 %    Neutrophils, Absolute 5.34 1.70 - 7.00 10*3/mm3    Lymphocytes, Absolute 1.34 0.70 - 3.10 10*3/mm3    Monocytes, Absolute 0.42 0.10 - 0.90 10*3/mm3    Eosinophils, Absolute 0.12 0.00 - 0.40 10*3/mm3    Basophils, Absolute 0.03 0.00 - 0.20 10*3/mm3    Immature Grans, Absolute 0.04 0.00 - 0.05 10*3/mm3    nRBC 0.0 0.0 - 0.2 /100 WBC   ECG 12 Lead ED Triage Standing Order; Chest Pain    Collection Time: 04/30/23  6:27 AM   Result Value Ref Range    QT Interval 362 ms    QTC Interval 438 ms   High Sensitivity Troponin T 2Hr    Collection Time: 04/30/23  6:38 AM    Specimen: Blood   Result Value Ref Range    HS Troponin T 17 (H) <15 ng/L    Troponin T Delta -2 >=-4 - <+4 ng/L     Note: In addition to lab results from this visit, the labs listed above may include labs taken at another facility or during a different encounter within the last 24 hours. Please correlate lab times with ED admission and discharge times for further clarification of the services performed during this visit.    CT Abdomen Pelvis Without Contrast   Final Result   1. No renal stones or hydronephrosis..   2. No bowel obstruction or appendicitis.   3. 4 cm right iliac artery aneurysm.   5. Diverticulosis without evidence of diverticulitis.   6. Small hiatal hernia.            Electronically signed by:  Jose Roberto Nielsen D.O.     4/30/2023 4:05 AM Mountain Time      XR Chest 1 View   Final Result   1.  No  consolidation or pleural effusion   2.  Heart size is normal.    3.  Prominent central vessels as before.    4.  No acute bony findings.          Electronically signed by:  Mira Alford M.D.     4/30/2023 3:09 AM Mountain Time        Vitals:    04/30/23 0530 04/30/23 0600 04/30/23 0630 04/30/23 0800   BP: 142/89 136/85 144/87 136/99   BP Location:       Patient Position:       Pulse: 71 89 93 85   Resp:   18    Temp:       TempSrc:       SpO2: 98% 95% 96% 97%   Weight:       Height:         Medications   sodium chloride 0.9 % flush 10 mL (has no administration in time range)   aspirin chewable tablet 324 mg (324 mg Oral Not Given 4/30/23 0457)   ondansetron (ZOFRAN) injection 4 mg (4 mg Intravenous Given 4/30/23 0458)   famotidine (PEPCID) injection 20 mg (20 mg Intravenous Given 4/30/23 0458)   aluminum-magnesium hydroxide-simethicone (MAALOX MAX) 400-400-40 MG/5ML 30 mL, Lidocaine Viscous HCl (XYLOCAINE) 2 % 15 mL suspension ( Oral Given 4/30/23 0458)     ECG/EMG Results (last 24 hours)     Procedure Component Value Units Date/Time    ECG 12 Lead ED Triage Standing Order; Chest Pain [[id number]] Collected: 04/30/23 0409     Updated: 04/30/23 0409     QT Interval 410 ms      QTC Interval 439 ms     Narrative:      Test Reason : CP  Blood Pressure :   */*   mmHG  Vent. Rate :  69 BPM     Atrial Rate :  69 BPM     P-R Int : 222 ms          QRS Dur : 104 ms      QT Int : 410 ms       P-R-T Axes :  37   0  16 degrees     QTc Int : 439 ms    ** Poor data quality, interpretation may be adversely affected  Sinus rhythm with 1st degree AV block  Otherwise normal ECG  When compared with ECG of 28-APR-2023 11:27, (Unconfirmed)  Sinus rhythm has replaced Atrial fibrillation    Referred By: EDMD           Confirmed By:     ECG 12 Lead ED Triage Standing Order; Chest Pain [[id number]] Collected: 04/30/23 0627     Updated: 04/30/23 0627     QT Interval 362 ms      QTC Interval 438 ms     Narrative:      Test Reason : ED Triage  Standing Order~  Blood Pressure :   */*   mmHG  Vent. Rate :  88 BPM     Atrial Rate :  88 BPM     P-R Int : 222 ms          QRS Dur : 100 ms      QT Int : 362 ms       P-R-T Axes :  29   9  20 degrees     QTc Int : 438 ms    Sinus rhythm with 1st degree AV block  Otherwise normal ECG  When compared with ECG of 30-APR-2023 04:09, (Unconfirmed)  No significant change was found    Referred By: EDMD           Confirmed By:         ECG 12 Lead ED Triage Standing Order; Chest Pain   Preliminary Result   Test Reason : ED Triage Standing Order~   Blood Pressure :   */*   mmHG   Vent. Rate :  88 BPM     Atrial Rate :  88 BPM      P-R Int : 222 ms          QRS Dur : 100 ms       QT Int : 362 ms       P-R-T Axes :  29   9  20 degrees      QTc Int : 438 ms      Sinus rhythm with 1st degree AV block   Otherwise normal ECG   When compared with ECG of 30-APR-2023 04:09, (Unconfirmed)   No significant change was found      Referred By: EDMD           Confirmed By:       ECG 12 Lead ED Triage Standing Order; Chest Pain   Preliminary Result   Test Reason : CP   Blood Pressure :   */*   mmHG   Vent. Rate :  69 BPM     Atrial Rate :  69 BPM      P-R Int : 222 ms          QRS Dur : 104 ms       QT Int : 410 ms       P-R-T Axes :  37   0  16 degrees      QTc Int : 439 ms      ** Poor data quality, interpretation may be adversely affected   Sinus rhythm with 1st degree AV block   Otherwise normal ECG   When compared with ECG of 28-APR-2023 11:27, (Unconfirmed)   Sinus rhythm has replaced Atrial fibrillation      Referred By: EDMD           Confirmed By:               Final diagnoses:   Chest discomfort   Epigastric discomfort   Elevated liver function tests       ED Disposition  ED Disposition     ED Disposition   Discharge    Condition   Stable    Comment   --             Alejandro Richard PA-C  [address]   [address]  [facility] [address]  [phone]    In 3 days      Rory Bedolla MD  [address] [facility] [facility]  [address]  [place] [id number]  [phone]    In 3 days  This is a gastroenterologist if you would like a second opinion, or follow up with your gastroenterologist at  as scheduled.    Shaun Parker MD  [address] [facility]  [address]  [place] [id number]  [phone]    In 3 days  your cardiologist         Medication List      Changed    Eliquis 5 MG tablet tablet  Generic drug: apixaban  TAKE ONE TABLET BY MOUTH TWICE A DAY  What changed:   · how much to take  · when to take this             María Kerr MD  04/30/23 0864

## 2023-05-01 LAB
QT INTERVAL: 342 MS
QTC INTERVAL: 438 MS

## 2023-05-04 ENCOUNTER — OFFICE VISIT (OUTPATIENT)
Dept: SLEEP MEDICINE | Facility: HOSPITAL | Age: 78
End: 2023-05-04
Payer: COMMERCIAL

## 2023-05-04 VITALS
HEIGHT: 77 IN | SYSTOLIC BLOOD PRESSURE: 135 MMHG | BODY MASS INDEX: 26.52 KG/M2 | OXYGEN SATURATION: 96 % | DIASTOLIC BLOOD PRESSURE: 75 MMHG | HEART RATE: 58 BPM | WEIGHT: 224.6 LBS

## 2023-05-04 DIAGNOSIS — G47.33 OSA (OBSTRUCTIVE SLEEP APNEA): Primary | ICD-10-CM

## 2023-05-04 LAB
QT INTERVAL: 362 MS
QT INTERVAL: 410 MS
QTC INTERVAL: 438 MS
QTC INTERVAL: 439 MS

## 2023-05-04 NOTE — PROGRESS NOTES
Chief Complaint:   Chief Complaint   Patient presents with   • Follow-up       HPI:    Shaun Tyler is a 78 y.o. male here for follow-up of sleep apnea.  Patient was originally seen in consult for snoring, new onset A-fib and witnessed apneas.  He did have a sleep study that showed severe obstructive sleep apnea we spoke to him in office about these 3/9/2023.  Did decide to initiate CPAP therapy.  Patient has not had difficulty getting used to the mask but does feel at times he is smothering and not getting enough air is 95th percentile pressure is 13.0 settings are 8-18 with an AHI of 21.6.  We will try adjusting his pressures 12-20 before we talk about titration study possibly to BiPAP.  He does sleep about 4 hours nightly and if can get more he is happy.  He does but his Gary score of 4/24.  We will now move forward with setting changes        Current medications are:   Current Outpatient Medications:   •  Eliquis 5 MG tablet tablet, TAKE ONE TABLET BY MOUTH TWICE A DAY (Patient taking differently: Take 1 tablet by mouth Every 12 (Twelve) Hours.), Disp: 180 tablet, Rfl: 3  •  ferrous sulfate 325 (65 FE) MG tablet, Take 1 tablet by mouth 1 (One) Time Per Week. sundays, Disp: , Rfl:   •  fluticasone (FLONASE) 50 MCG/ACT nasal spray, 2 sprays into the nostril(s) as directed by provider As Needed for Rhinitis or Allergies., Disp: , Rfl:   •  lansoprazole (PREVACID) 30 MG capsule, Take 1 capsule by mouth Daily., Disp: , Rfl:   •  psyllium (METAMUCIL) 58.6 % packet, Take 1 packet by mouth Daily., Disp: , Rfl:   •  rosuvastatin (CRESTOR) 10 MG tablet, Take 1 tablet by mouth Every Night., Disp: 30 tablet, Rfl: 5  •  tamsulosin (FLOMAX) 0.4 MG capsule 24 hr capsule, Take 1 capsule by mouth Daily., Disp: 30 capsule, Rfl: 11.      The patient's relevant past medical, surgical, family and social history were reviewed and updated in Epic as appropriate.       Review of Systems   Eyes: Positive for visual disturbance.  "  Respiratory: Positive for apnea.    Cardiovascular: Positive for palpitations.   Neurological: Positive for dizziness.   Psychiatric/Behavioral: Positive for sleep disturbance.   All other systems reviewed and are negative.        Objective:    Physical Exam  Constitutional:       Appearance: Normal appearance.   HENT:      Head: Normocephalic and atraumatic.      Mouth/Throat:      Comments: Class 3 airway  Cardiovascular:      Rate and Rhythm: Regular rhythm. Bradycardia present.   Pulmonary:      Effort: Pulmonary effort is normal.      Breath sounds: Normal breath sounds.   Skin:     General: Skin is warm and dry.   Neurological:      Mental Status: He is alert and oriented to person, place, and time.   Psychiatric:         Mood and Affect: Mood normal.         Behavior: Behavior normal.         Thought Content: Thought content normal.         Judgment: Judgment normal.       /75 (BP Location: Left arm, Patient Position: Sitting)   Pulse 58   Ht 195.6 cm (77\")   Wt 102 kg (224 lb 9.6 oz)   SpO2 96%   BMI 26.63 kg/m²     CPAP Report  45/48 days of use  Greater than 4-hour use 88%  Setting 8-18  AHI of 21.6  95th percentile pressure 13.0    The patient continues to use and benefit from CPAP therapy.    ASSESSMENT/PLAN    Diagnoses and all orders for this visit:    1. NATHAN (obstructive sleep apnea) (Primary)  -     PAP Therapy        1. Counseled patient regarding multimodal approach with healthy nutrition, healthy sleep, regular physical activity, social activities, counseling, and medications. Encouraged to practice lateral sleep position. Avoid alcohol and sedatives close to bedtime.  2.   Increase pressures 12-20 before ordering titration I will see patient back in 8 or 9 weeks.    I have reviewed the results of my evaluation and impression and discussed my recommendations in detail with the patient.      Signed by  SANDRA Churchill    May 4, 2023      CC: Alejandro Richard PA-C         No " ref. provider found

## 2023-05-08 ENCOUNTER — OFFICE VISIT (OUTPATIENT)
Dept: CARDIOLOGY | Facility: CLINIC | Age: 78
End: 2023-05-08
Payer: COMMERCIAL

## 2023-05-08 VITALS
HEIGHT: 76 IN | WEIGHT: 224 LBS | OXYGEN SATURATION: 98 % | DIASTOLIC BLOOD PRESSURE: 60 MMHG | HEART RATE: 82 BPM | SYSTOLIC BLOOD PRESSURE: 118 MMHG | BODY MASS INDEX: 27.28 KG/M2

## 2023-05-08 DIAGNOSIS — R94.31 ACUTE ELECTROCARDIOGRAM CHANGES: Primary | ICD-10-CM

## 2023-05-08 DIAGNOSIS — I48.0 PAROXYSMAL ATRIAL FIBRILLATION: ICD-10-CM

## 2023-05-08 DIAGNOSIS — I10 ESSENTIAL HYPERTENSION: ICD-10-CM

## 2023-05-08 DIAGNOSIS — E78.5 HYPERLIPIDEMIA LDL GOAL <100: ICD-10-CM

## 2023-05-08 PROBLEM — K83.1 BILIARY OBSTRUCTION: Status: ACTIVE | Noted: 2023-04-05

## 2023-05-08 PROBLEM — H50.21 HYPERTROPIA OF RIGHT EYE: Status: ACTIVE | Noted: 2020-10-06

## 2023-05-08 PROBLEM — H50.05 ALTERNATING ESOTROPIA: Status: ACTIVE | Noted: 2020-10-06

## 2023-05-08 PROBLEM — J30.1 ALLERGIC RHINITIS DUE TO POLLEN: Status: ACTIVE | Noted: 2018-04-30

## 2023-05-08 PROBLEM — R39.11 HESITANCY: Status: ACTIVE | Noted: 2018-09-04

## 2023-05-08 NOTE — PROGRESS NOTES
OFFICE VISIT  NOTE  [facility] CARDIOLOGY [place]      Name: Shaun Tyler    Date: 2023  MRN:  [id number]  :  1945      REFERRING/PRIMARY PROVIDER:  Alejandro Richard PA-C     Chief Complaint   Patient presents with   • PAF     Follow up from ER visit       HPI: Shaun Tyler is a 78 y.o. male who presents today for follow-up for a-fib.  Associated history of percutaneous ASD closure in , hypertension, hyperlipidemia.  Work-up at  2020 includes echocardiogram showing EF of 40 to 50%, mild LA enlargement, moderate RA enlargement, normal pulmonary pressures, ASD closure device in place. Started flecainide 3/23/2020, underwent successful external cardioversion 2020.  Low risk stress test and echo 2021. Had recurrent Afib despite increase in Flecainide and was referred to Dr. Ruvalcaba. Had PVA for persistent Afib 2023.  Doing well from A-fib standpoint, maintaining sinus rhythm feels much better.  Admitted to  23 with hematuria, found to have enlarged common bile duct, MRCP showed area of stricture and possible enhancement again possible lesion.  ERCP scheduled for next    Unfortunately he has had 3 ER visits due to lightheadedness dizziness near syncope, 1 event requiring atropine by EMS due to low heart rate in the 30s during the day.  He saw vascular surgery at  today, they recommend aneurysm repair but need cardiac clearance.  He is currently wearing an M cot monitor.    ROS:Pertinent positives as listed in the HPI.  All other systems reviewed and negative.    Past Medical History:   Diagnosis Date   • A-fib    • Acute electrocardiogram changes 2020   • Atrial septal defect    • Chicken pox    • GERD (gastroesophageal reflux disease)    • Hematuria, unspecified type    • Hiatal hernia    • [place] hearing[place]     does not wear his hearing aids   • Hyperbilirubinemia    • Measles    • Mumps    • S/P patch closure of atrial septal defect   "  • Sleep apnea     does not use c-pa p   • Wears dentures        Past Surgical History:   Procedure Laterality Date   • ATRIAL SEPTAL DEFECT REPAIR  2005   • CARDIAC ELECTROPHYSIOLOGY PROCEDURE N/A 01/04/2023    Procedure: PVA (persistent), Rythmia poss STX, no meds to hold;  Surgeon: Escobar Ruvalcaba DO;  Location: [facility] INVASIVE LOCATION;  Service: Cardiovascular;  Laterality: N/A;   • CARDIOVERSION      11/4/2022 per Dr. Parker   • COLONOSCOPY     • ENDOSCOPY     • EXCISION / REPAIR HYDROCELE PEDIATRIC     • EYE SURGERY Bilateral     lasik and cataract   • TONSILLECTOMY     • WISDOM TOOTH EXTRACTION         Social History     Socioeconomic History   • Marital status:    Tobacco Use   • Smoking status: Never     Passive exposure: Never   • Smokeless tobacco: Never   Vaping Use   • Vaping Use: Never used   Substance and Sexual Activity   • Alcohol use: Not Currently   • Drug use: Never   • Sexual activity: Not Currently     Partners: Female       Family History   Problem Relation Age of Onset   • Cancer Mother    • Pneumonia Sister         Allergies   Allergen Reactions   • Penicillins Swelling     Face and lips as a child        Current Outpatient Medications   Medication Instructions   • Eliquis 5 MG tablet tablet TAKE ONE TABLET BY MOUTH TWICE A DAY   • ferrous sulfate 325 mg, Oral, Weekly, sundays   • fluticasone (FLONASE) 50 MCG/ACT nasal spray 2 sprays, Nasal, As Needed   • lansoprazole (PREVACID) 30 mg, Oral, Daily   • psyllium (METAMUCIL) 58.6 % packet 1 packet, Oral, Daily   • rosuvastatin (CRESTOR) 10 mg, Oral, Nightly   • tamsulosin (FLOMAX) 0.4 mg, Oral, Daily       Vitals:    05/08/23 1444   BP: 118/60   BP Location: Left arm   Patient Position: Sitting   Pulse: 82   SpO2: 98%   Weight: 102 kg (224 lb)   Height: 193 cm (76\")     Body mass index is 27.27 kg/m².    PHYSICAL EXAM:    General Appearance:   · well developed  · well nourished  Neck:  · thyroid not " enlarged  · supple  Respiratory:  · no respiratory distress  · normal breath sounds  · no rales  Cardiovascular:  · no jugular venous distention  · regular rhythm  · apical impulse normal  · S1 normal, S2 normal  · no S3, no S4   · no murmur  · no rub, no thrill  · carotid pulses normal; no bruit  · pedal pulses normal  · lower extremity edema: none    Skin:   warm, dry    RESULTS:   Procedures    Results for orders placed during the hospital encounter of 11/22/22    Adult Transesophageal Echo (SENAIT) W/ Cont if Necessary Per Protocol    Interpretation Summary  •  Left ventricular systolic function is normal. Left ventricular ejection fraction appears to be 56 - 60%.  •  Calculated right ventricular systolic pressure from tricuspid regurgitation is 30 mmHg.  •  Amplatzer atrial septal occluder device with normal appearance and no residual leak.  •  Normal left atrial appendage with no evidence of thrombus.    I supervised and directed an independent trained observer with the assistance of monitoring the patient's level of consciousness and physiological status throughout the procedure.  Intraoperative service time of 25 minutes        Labs:  Lab Results   Component Value Date    CHOL 278 (H) 04/28/2023    TRIG 186 (H) 04/28/2023    HDL 36 (L) 04/28/2023     (H) 04/28/2023     (H) 04/30/2023     (H) 04/30/2023     Lab Results   Component Value Date    HGBA1C 5.8 (H) 03/15/2023     Creatinine   Date Value Ref Range Status   04/30/2023 0.85 0.76 - 1.27 mg/dL Final   04/28/2023 1.04 0.76 - 1.27 mg/dL Final   04/24/2023 0.79 0.76 - 1.27 mg/dL Final   02/09/2023 1.20 0.60 - 1.30 mg/dL Final   11/23/2021 1.10 0.80 - 1.30 mg/dL Final   05/06/2020 1.10 0.60 - 1.30 mg/dL Final     Comment:     Serial Number: [id number]:  [id number]     eGFR Non  Am   Date Value Ref Range Status   11/23/2021 >60 >60 mL/min/1.73m*2 Final     Comment:     eGFR = estimated GFR; eGFR units = mL/min/1.73 sq meters Chronic  Kidney Disease is considered if eGFR <60 mL/min/1.73 sq meters Kidney failure is considered if eGFR is <15 mL/min/1.73 sq meters. eGFR assumes steady state plasma creatinine concentration; not applicable if renal function is rapidly changing or patient is on dialysis.     eGFR Non  Amer   Date Value Ref Range Status   05/06/2020 75 >60 mL/min/1.73 Final         ASSESSMENT:  Problem List Items Addressed This Visit        Cardiac and Vasculature    Paroxysmal atrial fibrillation    Overview     · 5/6/20: successful ECV  · Echo 4/19/21: EF 61 - 65%. Left ventricular systolic function is normal. No significant structural or functional valvular disease.  · MPS 4/26/2021:no evidence of ischemia.Left ventricular ejection fraction is hyperdynamic (Calculated EF > 70%).  · ECV 7/19/22:  • EPS, 1/4/2023 with Right antral circumferential pulmonary vein isolation. Extensive ablation involving wide antral circumferential pulmonary vein isolation left atrial roof left atrial floor entire left atrial posterior wall and base of left atrial appendage  • Successful DC cardioversion, 2/9/2023         Essential hypertension    Hyperlipidemia LDL goal <100    Acute electrocardiogram changes - Primary       PLAN:    1.  Persistent atrial fibrillation/atrial flutter:  Had PVA with Dr. Ruvalcaba 01/2023  Continue Eliquis   Doing well maintaining sinus rhythm feels better    Possible tachybradycardia syndrome, heart rates in the 30s nocturnal on M cot but also required atropine by EMS during a presyncopal spell late in April, will discuss with Dr. Ruvalcaba regarding possible pacemaker placement, patient is agreeable for pacemaker if needed.    2.    Hypotension:  Asymptomatic, no need for midodrine at this time  Increase hydration     3.  Hyperlipidemia:  Well-controlled, continue simvastatin 40 mg daily     4.  Decreased exercise tolerance:  Improving  Echocardiogram 4/19/21 LVEF 61-65%, no valvular disease  Stress test 4/19/21 no evidence  of ischemia   Likely due to arrhythmia    5.  Preop cardiovascular assessment:  Okay to proceed with ERCP  Hold off on vascular surgery until rhythm stable    Advance Care Planning   ACP discussion was held with the patient during this visit. Patient does not have an advance directive, information provided.          Follow-up   No follow-ups on file.  Shaun Parker MD, FACC, [facility]  Interventional Cardiology

## 2023-05-09 ENCOUNTER — OFFICE VISIT (OUTPATIENT)
Dept: CARDIOLOGY | Facility: HOSPITAL | Age: 78
End: 2023-05-09
Payer: COMMERCIAL

## 2023-05-09 VITALS
SYSTOLIC BLOOD PRESSURE: 103 MMHG | HEART RATE: 51 BPM | RESPIRATION RATE: 16 BRPM | WEIGHT: 225.13 LBS | HEIGHT: 76 IN | TEMPERATURE: 97.4 F | OXYGEN SATURATION: 94 % | DIASTOLIC BLOOD PRESSURE: 57 MMHG | BODY MASS INDEX: 27.42 KG/M2

## 2023-05-09 DIAGNOSIS — Q21.9 CONGENITAL SEPTAL DEFECT OF HEART: ICD-10-CM

## 2023-05-09 DIAGNOSIS — I48.19 PERSISTENT ATRIAL FIBRILLATION: Primary | ICD-10-CM

## 2023-05-09 DIAGNOSIS — I10 ESSENTIAL HYPERTENSION: ICD-10-CM

## 2023-05-09 NOTE — PROGRESS NOTES
"Chief Complaint  Chest Pain and Establish Care    Subjective      History of Present Illness {CC  Problem List  Visit  Diagnosis   Encounters  Notes  Medications  Labs  Result Review Imaging  Media :23}     Shaun Tyler, 78 y.o. male with persistent atrial fibrillation, HTN, NATHAN, congenital septal heart defect s/p Amplatzer device closure presents to [facility] for Chest Pain and Establish Care.    Patient presents today for emergency department follow-up, but also recently completed visit with primary cardiology yesterday.  Recently with recurrent ED evaluations for lightheadedness and near syncope, with reported bradycardia.  He is also working with  vascular surgery regarding potential aneurysm repair.  Completed visit with primary cardiology yesterday with plan to discuss potential tachybradycardia syndrome with electrophysiology, potential pacemaker discussed.    Shaun is doing well today and overall without cardiac complaints, besides his recent recurrent near syncopal episodes.  Continues wearing MCOT, and highly anticipating response from electrophysiology regarding potential pacemaker.      Objective     Vital Signs:   Vitals:    05/09/23 0908 05/09/23 0909 05/09/23 0911   BP: 112/64 111/63 103/57   BP Location: Right arm Left arm Left arm   Patient Position: Sitting Standing Sitting   Cuff Size: Adult Adult Adult   Pulse: 51 53 51   Resp:   16   Temp:   97.4 °F (36.3 °C)   TempSrc:   Temporal   SpO2: 97% 94% 94%   Weight:   102 kg (225 lb 2 oz)   Height:   193 cm (76\")     Body mass index is 27.4 kg/m².  Physical Exam  Vitals and nursing note reviewed.   Constitutional:       Appearance: Normal appearance.   HENT:      Head: Normocephalic.   Eyes:      Extraocular Movements: Extraocular movements intact.   Neck:      Vascular: No carotid bruit.   Cardiovascular:      Rate and Rhythm: Normal rate and regular rhythm.      Pulses: Normal pulses.      Heart sounds: " Normal heart sounds, S1 normal and S2 normal. No murmur heard.  Pulmonary:      Effort: Pulmonary effort is normal. No respiratory distress.      Breath sounds: Normal breath sounds.   Musculoskeletal:      Cervical back: Neck supple.      Right lower leg: No edema.      Left lower leg: No edema.   Skin:     General: Skin is warm and dry.   Neurological:      General: No focal deficit present.      Mental Status: He is alert.   Psychiatric:         Mood and Affect: Mood normal.         Behavior: Behavior normal.         Thought Content: Thought content normal.        Data Reviewed:{ Labs  Result Review  Imaging  Med Tab  Media :23}     ECG 12 Lead ED Triage Standing Order; Chest Pain (04/30/2023 06:27)  Adult Transthoracic Echo Complete W/ Cont if Necessary Per Protocol (04/07/2023 14:23)  Cardioversion External in Cardiology Department (02/09/2023 10:24)  Adult Transesophageal Echo (SENAIT) W/ Cont if Necessary Per Protocol (11/22/2022 10:43)    BNP (04/30/2023 04:16)  Comprehensive Metabolic Panel (04/30/2023 04:16)  CBC & Differential (04/30/2023 04:16)       Assessment & Plan   Assessment and Plan {CC Problem List  Visit Diagnosis  ROS  Review (Popup)  Health Maintenance  Quality  BestPractice  Medications  SmartSets  SnapShot Encounters  Media :23}     1. Persistent atrial fibrillation  -s/p recent ablation by Dr. Ruvalcaba  -Recent near syncope, palpitations, bradycardia  -[id number]: 3  -Continue AC with apixaban 5mg every 12h  -Continue with MCOT per primary cardiology  -Continue with communication regarding EP discussion of potential tachy-marian syndromeconsideration of PPM    2. Essential hypertension  -Well controlled  -Discussed maintaining adequate hydration, compression sock use for episodic leg weakness    3. Sleep Apnea  -Continue follow-up with sleep medicine,   -Compliant with CPAP therapy    Follow Up {Instructions Charge Capture  Follow-up Communications :23}     Return if symptoms  worsen or fail to improve.      Patient was given instructions and counseling regarding his condition or for health maintenance advice. Please see specific information pulled into the AVS if appropriate.  Patient was instructed to call the [facility] with any questions, concerns, or worsening symptoms.    Dictated Utilizing Dragon Dictation   Please note that portions of this note were completed with a voice recognition program.   Part of this note may be an electronic transcription/translation of spoken language to printed text using the Dragon Dictation System.

## 2023-05-11 ENCOUNTER — TELEPHONE (OUTPATIENT)
Dept: CARDIOLOGY | Facility: CLINIC | Age: 78
End: 2023-05-11
Payer: COMMERCIAL

## 2023-05-11 DIAGNOSIS — R68.89 DECREASED EXERCISE TOLERANCE: ICD-10-CM

## 2023-05-11 DIAGNOSIS — I48.0 PAROXYSMAL ATRIAL FIBRILLATION: Primary | ICD-10-CM

## 2023-05-11 NOTE — TELEPHONE ENCOUNTER
----- Message from Escobar Ruvalcaba, DO sent at 5/10/2023  4:01 PM EDT -----  Regarding: treadmill stress test  [place],    Can you call Mr Tyler and ask him to come in for a modified Jacob protocol stress test?  This is to test him for chronotropic incompetence.      I have tried to call him myself x two days and he does not answer the phone.    Thanks much.

## 2023-05-18 ENCOUNTER — TELEPHONE (OUTPATIENT)
Dept: CARDIOLOGY | Facility: CLINIC | Age: 78
End: 2023-05-18
Payer: COMMERCIAL

## 2023-05-18 NOTE — TELEPHONE ENCOUNTER
Pt wanted to update us on bladder cancer diagnosis and procedure. He did not know if they will need CC or not. Spoke with surgery scheduling at Dr. Fede Alcantar office for pt upcoming TURBT tentatively scheduled for 6/8. They said pts pre-admission testing is scheduled for 5/30 and they will reach out at that time, if they need CC.

## 2023-05-24 ENCOUNTER — HOSPITAL ENCOUNTER (OUTPATIENT)
Dept: CARDIOLOGY | Facility: HOSPITAL | Age: 78
Discharge: HOME | End: 2023-05-24
Admitting: INTERNAL MEDICINE
Payer: COMMERCIAL

## 2023-05-24 VITALS
WEIGHT: 224.87 LBS | HEIGHT: 76 IN | HEART RATE: 45 BPM | DIASTOLIC BLOOD PRESSURE: 80 MMHG | SYSTOLIC BLOOD PRESSURE: 140 MMHG | BODY MASS INDEX: 27.38 KG/M2

## 2023-05-24 DIAGNOSIS — I48.0 PAROXYSMAL ATRIAL FIBRILLATION: ICD-10-CM

## 2023-05-24 DIAGNOSIS — R68.89 DECREASED EXERCISE TOLERANCE: ICD-10-CM

## 2023-05-24 LAB
BH CV STRESS BP STAGE 1: NORMAL
BH CV STRESS BP STAGE 2: NORMAL
BH CV STRESS BP STAGE 3: NORMAL
BH CV STRESS DURATION MIN STAGE 1: 3
BH CV STRESS DURATION MIN STAGE 2: 3
BH CV STRESS DURATION MIN STAGE 3: 3
BH CV STRESS DURATION MIN STAGE 4: 1
BH CV STRESS DURATION SEC STAGE 1: 0
BH CV STRESS DURATION SEC STAGE 2: 0
BH CV STRESS DURATION SEC STAGE 3: 0
BH CV STRESS DURATION SEC STAGE 4: 0
BH CV STRESS GRADE STAGE 1: 0
BH CV STRESS GRADE STAGE 2: 5
BH CV STRESS GRADE STAGE 3: 10
BH CV STRESS GRADE STAGE 4: 12
BH CV STRESS HR STAGE 1: 78
BH CV STRESS HR STAGE 2: 95
BH CV STRESS HR STAGE 3: 112
BH CV STRESS HR STAGE 4: 120
BH CV STRESS METS STAGE 1: 2.3
BH CV STRESS METS STAGE 2: 3.5
BH CV STRESS METS STAGE 3: 4.6
BH CV STRESS METS STAGE 4: 7
BH CV STRESS O2 STAGE 1: 97
BH CV STRESS O2 STAGE 2: 98
BH CV STRESS O2 STAGE 3: 98
BH CV STRESS O2 STAGE 4: 98
BH CV STRESS PROTOCOL 1: NORMAL
BH CV STRESS RECOVERY BP: NORMAL MMHG
BH CV STRESS RECOVERY HR: 70 BPM
BH CV STRESS RECOVERY O2: 99 %
BH CV STRESS SPEED STAGE 1: 1.7
BH CV STRESS SPEED STAGE 2: 1.7
BH CV STRESS SPEED STAGE 3: 1.7
BH CV STRESS SPEED STAGE 4: 2.5
BH CV STRESS STAGE 1: 1
BH CV STRESS STAGE 2: 2
BH CV STRESS STAGE 3: 3
BH CV STRESS STAGE 4: 4
MAXIMAL PREDICTED HEART RATE: 144 BPM
PERCENT MAX PREDICTED HR: 83.33 %
STRESS BASELINE BP: NORMAL MMHG
STRESS BASELINE HR: 45 BPM
STRESS O2 SAT REST: 95 %
STRESS PERCENT HR: 98 %
STRESS POST ESTIMATED WORKLOAD: 5.6 METS
STRESS POST EXERCISE DUR MIN: 10 MIN
STRESS POST EXERCISE DUR SEC: 0 SEC
STRESS POST O2 SAT PEAK: 98 %
STRESS POST PEAK BP: NORMAL MMHG
STRESS POST PEAK HR: 120 BPM
STRESS TARGET HR: 122 BPM

## 2023-05-24 PROCEDURE — 93017 CV STRESS TEST TRACING ONLY: CPT

## 2023-05-26 ENCOUNTER — DOCUMENTATION (OUTPATIENT)
Dept: CARDIOLOGY | Facility: CLINIC | Age: 78
End: 2023-05-26
Payer: COMMERCIAL

## 2023-05-26 NOTE — PROGRESS NOTES
I received a message to call Dr. Alvarez, vascular surgeon at  about the patient.  I called him and we discussed his cardiac condition.  Mr. Tyler had a modified Jacob treadmill stress test earlier this week that showed normal chronotropic competence and no ischemic changes.  Based on patient's functional capacity and recent testing, I relayed to Dr. Alvarez that he may proceed with the vascular surgery at low cardiovascular risk.  No further cardiovascular testing necessary prior to surgery.  Dr. Alvarez stated he would convey this to the patient.

## 2023-08-04 RX ORDER — APIXABAN 5 MG/1
TABLET, FILM COATED ORAL
Qty: 180 TABLET | Refills: 3 | Status: SHIPPED | OUTPATIENT
Start: 2023-08-04

## 2023-08-24 RX ORDER — ROSUVASTATIN CALCIUM 10 MG/1
10 TABLET, COATED ORAL NIGHTLY
Qty: 90 TABLET | Refills: 3 | Status: SHIPPED | OUTPATIENT
Start: 2023-08-24

## 2023-10-14 ENCOUNTER — HOSPITAL ENCOUNTER (OUTPATIENT)
Dept: SLEEP MEDICINE | Facility: HOSPITAL | Age: 78
Discharge: HOME | End: 2023-10-14
Admitting: NURSE PRACTITIONER
Payer: COMMERCIAL

## 2023-10-14 VITALS — BODY MASS INDEX: 26.66 KG/M2 | HEIGHT: 76 IN | WEIGHT: 218.92 LBS

## 2023-10-14 DIAGNOSIS — G47.33 OSA (OBSTRUCTIVE SLEEP APNEA): ICD-10-CM

## 2023-10-14 PROCEDURE — 95811 POLYSOM 6/>YRS CPAP 4/> PARM: CPT

## 2023-10-15 ENCOUNTER — HOSPITAL ENCOUNTER (EMERGENCY)
Facility: HOSPITAL | Age: 78
Discharge: HOME | End: 2023-10-15
Attending: EMERGENCY MEDICINE | Admitting: EMERGENCY MEDICINE
Payer: COMMERCIAL

## 2023-10-15 ENCOUNTER — APPOINTMENT (OUTPATIENT)
Dept: GENERAL RADIOLOGY | Facility: HOSPITAL | Age: 78
End: 2023-10-15
Payer: COMMERCIAL

## 2023-10-15 VITALS
WEIGHT: 215 LBS | SYSTOLIC BLOOD PRESSURE: 124 MMHG | HEIGHT: 74 IN | DIASTOLIC BLOOD PRESSURE: 91 MMHG | BODY MASS INDEX: 27.59 KG/M2 | RESPIRATION RATE: 16 BRPM | OXYGEN SATURATION: 94 % | TEMPERATURE: 97.8 F | HEART RATE: 42 BPM

## 2023-10-15 DIAGNOSIS — R00.2 PALPITATIONS: Primary | ICD-10-CM

## 2023-10-15 DIAGNOSIS — R79.89 ELEVATED TSH: ICD-10-CM

## 2023-10-15 LAB
ALBUMIN SERPL-MCNC: 4.1 G/DL (ref 3.5–5.2)
ALBUMIN/GLOB SERPL: 1.4 G/DL
ALP SERPL-CCNC: 103 U/L (ref 39–117)
ALT SERPL W P-5'-P-CCNC: 18 U/L (ref 1–41)
ANION GAP SERPL CALCULATED.3IONS-SCNC: 9 MMOL/L (ref 5–15)
AST SERPL-CCNC: 19 U/L (ref 1–40)
BASOPHILS # BLD AUTO: 0.05 10*3/MM3 (ref 0–0.2)
BASOPHILS NFR BLD AUTO: 0.9 % (ref 0–1.5)
BILIRUB SERPL-MCNC: 0.8 MG/DL (ref 0–1.2)
BUN SERPL-MCNC: 13 MG/DL (ref 8–23)
BUN/CREAT SERPL: 15.5 (ref 7–25)
CALCIUM SPEC-SCNC: 9.1 MG/DL (ref 8.6–10.5)
CHLORIDE SERPL-SCNC: 104 MMOL/L (ref 98–107)
CO2 SERPL-SCNC: 27 MMOL/L (ref 22–29)
CREAT SERPL-MCNC: 0.84 MG/DL (ref 0.76–1.27)
DEPRECATED RDW RBC AUTO: 46.5 FL (ref 37–54)
EGFRCR SERPLBLD CKD-EPI 2021: 90.4 ML/MIN/1.73
EOSINOPHIL # BLD AUTO: 0.14 10*3/MM3 (ref 0–0.4)
EOSINOPHIL NFR BLD AUTO: 2.5 % (ref 0.3–6.2)
ERYTHROCYTE [DISTWIDTH] IN BLOOD BY AUTOMATED COUNT: 13.2 % (ref 12.3–15.4)
GLOBULIN UR ELPH-MCNC: 3 GM/DL
GLUCOSE SERPL-MCNC: 113 MG/DL (ref 65–99)
HCT VFR BLD AUTO: 45.2 % (ref 37.5–51)
HGB BLD-MCNC: 15.1 G/DL (ref 13–17.7)
IMM GRANULOCYTES # BLD AUTO: 0.01 10*3/MM3 (ref 0–0.05)
IMM GRANULOCYTES NFR BLD AUTO: 0.2 % (ref 0–0.5)
LYMPHOCYTES # BLD AUTO: 1.58 10*3/MM3 (ref 0.7–3.1)
LYMPHOCYTES NFR BLD AUTO: 28.4 % (ref 19.6–45.3)
MAGNESIUM SERPL-MCNC: 2.1 MG/DL (ref 1.6–2.4)
MCH RBC QN AUTO: 31.5 PG (ref 26.6–33)
MCHC RBC AUTO-ENTMCNC: 33.4 G/DL (ref 31.5–35.7)
MCV RBC AUTO: 94.4 FL (ref 79–97)
MONOCYTES # BLD AUTO: 0.5 10*3/MM3 (ref 0.1–0.9)
MONOCYTES NFR BLD AUTO: 9 % (ref 5–12)
NEUTROPHILS NFR BLD AUTO: 3.29 10*3/MM3 (ref 1.7–7)
NEUTROPHILS NFR BLD AUTO: 59 % (ref 42.7–76)
NRBC BLD AUTO-RTO: 0 /100 WBC (ref 0–0.2)
PLATELET # BLD AUTO: 183 10*3/MM3 (ref 140–450)
PMV BLD AUTO: 9.1 FL (ref 6–12)
POTASSIUM SERPL-SCNC: 4.3 MMOL/L (ref 3.5–5.2)
PROT SERPL-MCNC: 7.1 G/DL (ref 6–8.5)
RBC # BLD AUTO: 4.79 10*6/MM3 (ref 4.14–5.8)
SODIUM SERPL-SCNC: 140 MMOL/L (ref 136–145)
TROPONIN T SERPL HS-MCNC: 19 NG/L
TSH SERPL DL<=0.05 MIU/L-ACNC: 4.63 UIU/ML (ref 0.27–4.2)
WBC NRBC COR # BLD: 5.57 10*3/MM3 (ref 3.4–10.8)

## 2023-10-15 PROCEDURE — 71045 X-RAY EXAM CHEST 1 VIEW: CPT

## 2023-10-15 PROCEDURE — 99283 EMERGENCY DEPT VISIT LOW MDM: CPT

## 2023-10-15 PROCEDURE — 99284 EMERGENCY DEPT VISIT MOD MDM: CPT

## 2023-10-15 PROCEDURE — 83735 ASSAY OF MAGNESIUM: CPT | Performed by: EMERGENCY MEDICINE

## 2023-10-15 PROCEDURE — 84484 ASSAY OF TROPONIN QUANT: CPT | Performed by: EMERGENCY MEDICINE

## 2023-10-15 PROCEDURE — 80050 GENERAL HEALTH PANEL: CPT | Performed by: EMERGENCY MEDICINE

## 2023-10-15 NOTE — ED PROVIDER NOTES
Subjective   History of Present Illness  This is a pleasant 78-year-old male followed by cardiology here.  Complex past history.  I reviewed his most recent note.  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  Generally active.    He sent to the emergency department today from the sleep lab where he was getting a titration study as he has had difficult to treat sleep apnea.  While trying a new mask there was some vibration of the mask, startled the patient and he was feeling palpitations noticed the heart rate was 100 which is much different than his basal rate of the low 40s.  He was still feeling out of sorts was sent to the emergency department for further evaluation.  Currently he feels much improved.  His heart rate is back down into the high 30s and low 40s which is not uncommon for him.    I reviewed his most recent cardiac monitor from June of this year.  I also reviewed his previous stress test from May of this year all acceptable.  I reviewed his most recent cardiology note as well.    Patient's not felt ill recently has had no fevers or chills.  His urine has been doing okay.  Bowel movements have been okay.  He has had no recent change in medications.  Still on Eliquis once a day.  He has had no episodes of syncope or near syncope.  He has had no peripheral edema.        All other systems reviewed and are negative except as noted above.        Review of Systems   All other systems reviewed and are negative.      Past Medical History:   Diagnosis Date    A-fib     Acute electrocardiogram changes 01/07/2020    Aneurysm     Atrial septal defect     Cancer Bladder sheet cancer    Chicken pox     GERD (gastroesophageal reflux disease)     Hematuria, unspecified type     Hiatal hernia     [place] (hard of hearing)     does not wear his hearing aids    Hyperbilirubinemia     Measles     Mumps     S/P patch closure of atrial septal defect     Sleep apnea     does not use c-pa p    Wears dentures    From cards note  Diagnoses and  all orders for this visit:     1. Paroxysmal atrial fibrillation (Primary)  -     ECG 12 Lead                Diagnosis Plan   1. Paroxysmal atrial fibrillation  Extensive PVA Dr. Ruvalcaba 1/2023.  Transseptal puncture around ASD occluder.  Normal GXT stress test 5/2023. Echocardiogram 4/2023, EF >50%. Mild RVE. No VHD. Previous ASD closure.   Significant symptomatic improvement.  No symptoms of A-fib since then.             2. NATHAN (obstructive sleep apnea)   severe.  CPAP compliant now.         3. Essential hypertension   blood pressure well controlled.       4. Chronic anticoagulation   Chadsvasc=3. Recurrent Hematuria on Eliquis 5mg BId. He is now only taking Eliquis once a day. Recent Urinary tract procedure 6/22/2023 and continued treatment for mass.  Now he still needs ERCP for mass, with previous procedure canceled due to low HR. reschedule for next week .       5. Congenital septal defect of heart   very large ASD occluder placed remotely.  Transseptal puncture performed safely in the EP lab for his A-fib ablation.   No clinical recurrence of Afib.  Asymptomatic bradycardia.  Normal stress test and echo spring 2023.  Difficult situation with Hematuria, Recommend Eliquis 5mg BID.  He understands high risk of CVA without taking Eliquis twice daily.  Can hold 72 hours before procedure.Stable Cv course follow-up as scheduled.   Electronically signed by CHAKA Barnett, 07/13/23, 11:05 AM EDT.    Interpretation Summary         Normal heart rate variation    Average heart rate 68    6% rate controlled atrial fibrillation     June 2023    Allergies   Allergen Reactions    Penicillins Swelling     Face and lips as a child     Gramineae Pollens Other (See Comments)     Sneezing, Eye Redness       Past Surgical History:   Procedure Laterality Date    ATRIAL SEPTAL DEFECT REPAIR  2005    CARDIAC ELECTROPHYSIOLOGY PROCEDURE N/A 01/04/2023    Procedure: PVA (persistent), Rythmia poss STX, no meds to hold;  Surgeon:  Escobar Ruvalcaba DO;  Location: [facility] INVASIVE LOC[facility];  Service: Cardiovascular;  Laterality: N/A;    CARDIOVERSION      11/4/2022 per Dr. Parker    COLONOSCOPY      ENDOSCOPY      EXCISION / REPAIR HYDROCELE PEDIATRIC      EYE SURGERY Bilateral     lasik and cataract    TONSILLECTOMY      WISDOM TOOTH EXTRACTION         Family History   Problem Relation Age of Onset    Cancer Mother     Pneumonia Sister        Social History     Socioeconomic History    Marital status:    Tobacco Use    Smoking status: Never     Passive exposure: Never    Smokeless tobacco: Never   Vaping Use    Vaping Use: Never used   Substance and Sexual Activity    Alcohol use: Not Currently    Drug use: Never    Sexual activity: Not Currently     Partners: Female           Objective   Physical Exam  Vitals and nursing note reviewed.   Constitutional:       Comments: This is a pleasant 78-year-old alert and oriented GCS 15.   HENT:      Head: Normocephalic and atraumatic.      Comments: With electrode gel in his hair.     Right Ear: External ear normal.      Left Ear: External ear normal.      Nose: Nose normal.      Mouth/Throat:      Mouth: Mucous membranes are moist.      Pharynx: Oropharynx is clear.   Eyes:      Extraocular Movements: Extraocular movements intact.      Conjunctiva/sclera: Conjunctivae normal.      Pupils: Pupils are equal, round, and reactive to light.   Neck:      Vascular: No carotid bruit.   Cardiovascular:      Pulses: Normal pulses.      Heart sounds: Normal heart sounds.      Comments: Slow and regular with on rhythm strip down into the high 30s sometimes.  He is asymptomatic for this.  No murmurs.  Pulmonary:      Effort: Pulmonary effort is normal.      Breath sounds: Normal breath sounds.   Abdominal:      Comments: BMI 27 positive bowel sounds soft nontender no organomegaly masses or guarding   Musculoskeletal:         General: No swelling or deformity. Normal range of motion.      Cervical back:  Normal range of motion and neck supple. No rigidity or tenderness.      Comments: We will pulses out synovitis rash or edema.   Lymphadenopathy:      Cervical: No cervical adenopathy.   Skin:     General: Skin is warm and dry.      Capillary Refill: Capillary refill takes less than 2 seconds.   Neurological:      Mental Status: He is alert.      Comments: Asymmetric, voice strong, tongue midline.  Vision, hearing, and speech preserved.  No focal weakness.         Procedures           ED Course           Recent Results (from the past 24 hour(s))   Comprehensive Metabolic Panel    Collection Time: 10/15/23  7:11 AM    Specimen: Blood   Result Value Ref Range    Glucose 113 (H) 65 - 99 mg/dL    BUN 13 8 - 23 mg/dL    Creatinine 0.84 0.76 - 1.27 mg/dL    Sodium 140 136 - 145 mmol/L    Potassium 4.3 3.5 - 5.2 mmol/L    Chloride 104 98 - 107 mmol/L    CO2 27.0 22.0 - 29.0 mmol/L    Calcium 9.1 8.6 - 10.5 mg/dL    Total Protein 7.1 6.0 - 8.5 g/dL    Albumin 4.1 3.5 - 5.2 g/dL    ALT (SGPT) 18 1 - 41 U/L    AST (SGOT) 19 1 - 40 U/L    Alkaline Phosphatase 103 39 - 117 U/L    Total Bilirubin 0.8 0.0 - 1.2 mg/dL    Globulin 3.0 gm/dL    A/G Ratio 1.4 g/dL    BUN/Creatinine Ratio 15.5 7.0 - 25.0    Anion Gap 9.0 5.0 - 15.0 mmol/L    eGFR 90.4 >60.0 mL/min/1.73   TSH    Collection Time: 10/15/23  7:11 AM    Specimen: Blood   Result Value Ref Range    TSH 4.630 (H) 0.270 - 4.200 uIU/mL   Magnesium    Collection Time: 10/15/23  7:11 AM    Specimen: Blood   Result Value Ref Range    Magnesium 2.1 1.6 - 2.4 mg/dL   High Sensitivity Troponin T    Collection Time: 10/15/23  7:11 AM    Specimen: Blood   Result Value Ref Range    HS Troponin T 19 (H) <15 ng/L   CBC Auto Differential    Collection Time: 10/15/23  7:11 AM    Specimen: Blood   Result Value Ref Range    WBC 5.57 3.40 - 10.80 10*3/mm3    RBC 4.79 4.14 - 5.80 10*6/mm3    Hemoglobin 15.1 13.0 - 17.7 g/dL    Hematocrit 45.2 37.5 - 51.0 %    MCV 94.4 79.0 - 97.0 fL    MCH 31.5  26.6 - 33.0 pg    MCHC 33.4 31.5 - 35.7 g/dL    RDW 13.2 12.3 - 15.4 %    RDW-SD 46.5 37.0 - 54.0 fl    MPV 9.1 6.0 - 12.0 fL    Platelets 183 140 - 450 10*3/mm3    Neutrophil % 59.0 42.7 - 76.0 %    Lymphocyte % 28.4 19.6 - 45.3 %    Monocyte % 9.0 5.0 - 12.0 %    Eosinophil % 2.5 0.3 - 6.2 %    Basophil % 0.9 0.0 - 1.5 %    Immature Grans % 0.2 0.0 - 0.5 %    Neutrophils, Absolute 3.29 1.70 - 7.00 10*3/mm3    Lymphocytes, Absolute 1.58 0.70 - 3.10 10*3/mm3    Monocytes, Absolute 0.50 0.10 - 0.90 10*3/mm3    Eosinophils, Absolute 0.14 0.00 - 0.40 10*3/mm3    Basophils, Absolute 0.05 0.00 - 0.20 10*3/mm3    Immature Grans, Absolute 0.01 0.00 - 0.05 10*3/mm3    nRBC 0.0 0.0 - 0.2 /100 WBC     Note: In addition to lab results from this visit, the labs listed above may include labs taken at another facility or during a different encounter within the last 24 hours. Please correlate lab times with ED admission and discharge times for further clarification of the services performed during this visit.    XR Chest 1 View   Final Result   Impression:   No acute cardiopulmonary process.         Electronically Signed: Kendra Eastman MD     10/15/2023 7:24 AM EDT     Workstation ID: [id number]        Vitals:    10/15/23 0800 10/15/23 0801 10/15/23 0814 10/15/23 0815   BP: 126/85   124/91   BP Location:       Patient Position:       Pulse: (!) 49   (!) 42   Resp:       Temp:       TempSrc:       SpO2:  93% 94%    Weight:       Height:         Medications - No data to display  ECG/EMG Results (last 24 hours)       ** No results found for the last 24 hours. **          No orders to display                                     Medical Decision Making      I have reviewed all available studies at the bedside with the patient.  His EKG is not showing up in use but I personally reviewed it it shows sinus bradycardia at about 40 beats 44 beats a minute.  His high-sensitivity troponin is 19 and reviewing his previous data generally runs  between 17 and 25 and so I do not think this episode represents acute coronary event.    His TSH is mildly elevated of uncertain clinical significance I have asked him to follow-up with his primary care doctor regarding this.    Patient is persistently bradycardic here but appropriately increases with activity this is his baseline.    I suspect the patient may have had a paroxysm of atrial fibrillation related to his poorly fitting mask for his titration sleep study today.  Thankfully he is been fine here and has no current symptoms.    I have asked him to follow-up with his primary care, sleep doctor, and cardiologist for ongoing evaluation and he will return to the emergency department if worse in any way.  I do not think he needs a change in medication currently.    All are agreeable with the plan        Problems Addressed:  Elevated TSH: undiagnosed new problem with uncertain prognosis  Palpitations: complicated acute illness or injury with systemic symptoms that poses a threat to life or bodily functions    Amount and/or Complexity of Data Reviewed  External Data Reviewed: labs, radiology, ECG and notes.  Labs: ordered. Decision-making details documented in ED Course.  Radiology: ordered and independent interpretation performed. Decision-making details documented in ED Course.  ECG/medicine tests: ordered and independent interpretation performed. Decision-making details documented in ED Course.        Final diagnoses:   Palpitations   Elevated TSH       ED Disposition  ED Disposition       ED Disposition   Discharge    Condition   Stable    Comment   --               Alejandro Richard PA-C  [address]   [address]  [place] [id number] [phone]    Schedule an appointment as soon as possible for a visit   Follow-up on your TSH    Shaun Parker MD  [address] [facility]  [address]  [place] [id number] [phone]      as scheduled         Medication List      No changes were made to your  prescriptions during this visit.            Navi Wagoner MD  10/15/23 [id number]

## 2023-10-15 NOTE — DISCHARGE INSTRUCTIONS
Need to follow-up with your primary care doctor with your TSH and may need further testing to see if he thinks it is clinically significant.

## 2023-10-23 ENCOUNTER — TELEPHONE (OUTPATIENT)
Dept: SLEEP MEDICINE | Facility: CLINIC | Age: 78
End: 2023-10-23
Payer: COMMERCIAL

## 2023-10-23 DIAGNOSIS — G47.31 PRIMARY CENTRAL SLEEP APNEA: Primary | ICD-10-CM

## 2023-10-23 NOTE — TELEPHONE ENCOUNTER
PT RETURNED [facility]'S CALL. I READ HIM THE NOTE IN THE CHART,BUT HE HAD QUESTIONS ABOUT WHAT IT MEANT AND I DON'T HAVE THE ANSWERS TO THOSE. HE WOULD LIKE SOMEONE TO CALL HIM BACK 10/13 LM

## 2023-10-23 NOTE — TELEPHONE ENCOUNTER
[facility] FOR PATIENT TO RETURN CALL. HE FAILED CPAP AND BIPAP THERAPY DUE TO CENTRAL APNEAS DURING TITRATION. DR. ANTONINO PICKERING ORDERED A ASV TITRATION. CENTRAL SCHEDULING WILL CALL TO SCHEDULE ONCE THEY RECEIVE INSURANCE AUTHORIZATION.

## 2023-10-25 ENCOUNTER — OFFICE VISIT (OUTPATIENT)
Dept: SLEEP MEDICINE | Facility: HOSPITAL | Age: 78
End: 2023-10-25
Payer: COMMERCIAL

## 2023-10-25 VITALS
BODY MASS INDEX: 28.62 KG/M2 | SYSTOLIC BLOOD PRESSURE: 120 MMHG | HEART RATE: 52 BPM | OXYGEN SATURATION: 96 % | DIASTOLIC BLOOD PRESSURE: 72 MMHG | WEIGHT: 223 LBS | HEIGHT: 74 IN

## 2023-10-25 DIAGNOSIS — G47.33 OSA (OBSTRUCTIVE SLEEP APNEA): ICD-10-CM

## 2023-10-25 DIAGNOSIS — R06.83 SNORING: Primary | ICD-10-CM

## 2023-10-25 NOTE — PROGRESS NOTES
Subjective:     Chief Complaint:   Chief Complaint   Patient presents with    Follow-up       HPI:    Shaun Tyler is a 78 y.o. male here for follow-up of NATHAN    I saw him in initial consultation in November 2022.  He had received a recent diagnosis of atrial fibrillation and underwent ECV.  He has been told that he snores by his wife and she had witnessed apneas.  He did not note much in the way of daytime somnolence.  Due to the suspicion of obstructive sleep apnea a home sleep apnea test was ordered and performed on 2/23.  This revealed a severe degree of NATHAN with an AHI of 48.    He was initiated on auto CPAP therapy.  At the time of his initial follow-up in May with APRN he had an AHI of 21.8 but also a high amount of mask leak.  His auto CPAP range was increased 12-20 cm H2O.  A return visit in July revealed an AHI of 11.9 with ongoing high leak.  A titration was ordered.    The titration was undertaken on 10/14 and multiple central events were noted.  Sleep efficiency was poor.  He had a very rapid titration to high BiPAP pressures.  Central events persisted.  He had some issues with relative tachycardia though his maximum rate was in the 80s.  Admittedly his heart rate typically is in the 50s.  He was sent to the ER but no issues were found and he was discharged home.  He did have an episode of vomiting after arriving home and then felt better.    He continues on auto CPAP therapy at the current settings.  He does describe an ongoing mask leak at times.  He has a fullface mask that goes over his nose that he is tightened significantly.    Spreckels Scale is: 7/24    Current medications are:   Current Outpatient Medications:     Eliquis 5 MG tablet tablet, TAKE ONE TABLET BY MOUTH TWICE A DAY, Disp: 180 tablet, Rfl: 3    ferrous sulfate 325 (65 FE) MG tablet, Take 1 tablet by mouth 1 (One) Time Per Week. sundays, Disp: , Rfl:     fluticasone (FLONASE) 50 MCG/ACT nasal spray, 2 sprays into the nostril(s)  as directed by provider As Needed for Rhinitis or Allergies., Disp: , Rfl:     lansoprazole (PREVACID) 30 MG capsule, Take 1 capsule by mouth Daily., Disp: , Rfl:     Mirabegron ER (MYRBETRIQ) 25 MG tablet sustained-release 24 hour 24 hr tablet, Take 1 tablet by mouth Daily., Disp: , Rfl:     psyllium (METAMUCIL) 58.6 % packet, Take 1 packet by mouth Daily., Disp: , Rfl:     rosuvastatin (CRESTOR) 10 MG tablet, Take 1 tablet by mouth Every Night., Disp: 90 tablet, Rfl: 3    tamsulosin (FLOMAX) 0.4 MG capsule 24 hr capsule, Take 1 capsule by mouth Daily., Disp: 30 capsule, Rfl: 11.      The patient's relevant past medical, surgical, family and social history were reviewed and updated in Epic as appropriate.     ROS:    Review of Systems      Objective:    Physical Exam  Vitals reviewed.   Constitutional:       Appearance: He is well-developed.   HENT:      Head: Normocephalic and atraumatic.      Mouth/Throat:      Mouth: Mucous membranes are moist.      Pharynx: Oropharynx is clear.      Comments: Class III airway  Neck:      Thyroid: No thyromegaly.   Cardiovascular:      Rate and Rhythm: Normal rate and regular rhythm.      Heart sounds: No murmur heard.     No friction rub. No gallop.   Pulmonary:      Effort: Pulmonary effort is normal. No respiratory distress.      Breath sounds: No wheezing or rales.   Musculoskeletal:      Cervical back: Neck supple.   Skin:     General: Skin is warm and dry.   Neurological:      Mental Status: He is alert and oriented to person, place, and time.   Psychiatric:         Behavior: Behavior normal.         Thought Content: Thought content normal.         DATA:    Reviewed most recent titration and downloads with the patient in detail.    Assessment:    Problem List Items Addressed This Visit          Pulmonary Problems    NATHAN (obstructive sleep apnea)    Relevant Orders    PAP Therapy    Snoring - Primary    Relevant Orders    PAP Therapy       78-year-old male with a severe  degree of obstructive sleep apnea based upon an HST in February.  Auto CPAP therapy has resulted in an ongoing elevation of his AHI and a high leak.  These events are not clearly central on his download.  He underwent a titration earlier this month which revealed multiple central events though the titration was very rapid to high pressures and sleep efficiency was poor.  The patient also noted that this night was unusual and that he felt ill and does not think this was a typical night for him.    Plan:     I think the current settings of auto CPAP 12-20 cm H2O are acceptable.  I initially wanted to consider him for a BiPAP ASV titration but after talking with him and reviewing his downloads it looks like we need to focus on his mask leak initially as it is unclear that the majority of his events are central at home.  It is not clear what the cause of his mask leak is but is clearly present.  The patient says he sometimes leaks over the top of the mask and sometimes at the bottom.  I think we could arrive at a better fullface mask for him and I have sent orders to his [employer] company to refit him  I will see him in close follow-up and assess his download at that time  Hold off on any further titrations for now    Discussed in detail with the patient.  He will call prior to his follow up visit for any new problems.    Level of service justified based on 31 minutes spent in patient care on this date of service including, but not limited to: preparing to see the patient, obtaining and/or reviewing history, performing medically appropriate examination, ordering tests/medicine/procedures, independently interpreting results, documenting clinical information in EHR, and counseling/education of patient/family/caregiver.  This is exclusive of time spent on other separate services such as performing procedures or test interpretation, if applicable.  (Level 4 30-39 minutes; Level 5 40-54 minutes)      Signed by  Kaushal SCHULTZ  MD Feliciano

## 2023-11-02 ENCOUNTER — TELEPHONE (OUTPATIENT)
Dept: NEUROLOGY | Facility: CLINIC | Age: 78
End: 2023-11-02
Payer: COMMERCIAL

## 2023-11-02 ENCOUNTER — HOSPITAL ENCOUNTER (OUTPATIENT)
Dept: MRI IMAGING | Facility: HOSPITAL | Age: 78
Discharge: HOME | End: 2023-11-02
Admitting: NURSE PRACTITIONER
Payer: COMMERCIAL

## 2023-11-02 DIAGNOSIS — C67.9 MALIGNANT NEOPLASM OF URINARY BLADDER, UNSPECIFIED SITE: ICD-10-CM

## 2023-11-02 DIAGNOSIS — R42 DIZZINESS: ICD-10-CM

## 2023-11-02 DIAGNOSIS — I95.1 ORTHOSTATIC HYPOTENSION: ICD-10-CM

## 2023-11-02 PROCEDURE — 70553 MRI BRAIN STEM W/O & W/DYE: CPT

## 2023-11-02 PROCEDURE — A9577 INJ MULTIHANCE: HCPCS | Performed by: NURSE PRACTITIONER

## 2023-11-02 PROCEDURE — 0 GADOBENATE DIMEGLUMINE 529 MG/ML SOLUTION: Performed by: NURSE PRACTITIONER

## 2023-11-02 RX ADMIN — GADOBENATE DIMEGLUMINE 20 ML: 529 INJECTION, SOLUTION INTRAVENOUS at 10:22

## 2023-11-02 NOTE — TELEPHONE ENCOUNTER
Called and left vm with results.      ----- Message from Chet Graham DNP, APRN sent at 11/2/2023 11:22 AM EDT -----  Please notify pt mri of brain shows changes consistent with aging. No concerning findings.

## 2024-01-08 ENCOUNTER — OFFICE VISIT (OUTPATIENT)
Dept: CARDIOLOGY | Facility: CLINIC | Age: 79
End: 2024-01-08
Payer: COMMERCIAL

## 2024-01-08 VITALS
OXYGEN SATURATION: 92 % | BODY MASS INDEX: 28.59 KG/M2 | SYSTOLIC BLOOD PRESSURE: 110 MMHG | DIASTOLIC BLOOD PRESSURE: 74 MMHG | WEIGHT: 222.8 LBS | HEART RATE: 57 BPM | HEIGHT: 74 IN

## 2024-01-08 DIAGNOSIS — I48.0 PAROXYSMAL ATRIAL FIBRILLATION: Primary | ICD-10-CM

## 2024-01-08 DIAGNOSIS — I10 ESSENTIAL HYPERTENSION: ICD-10-CM

## 2024-01-08 DIAGNOSIS — Z79.01 CHRONIC ANTICOAGULATION: ICD-10-CM

## 2024-01-08 DIAGNOSIS — G47.33 OSA (OBSTRUCTIVE SLEEP APNEA): ICD-10-CM

## 2024-01-08 PROBLEM — R42 DIZZINESS: Status: RESOLVED | Noted: 2023-04-28 | Resolved: 2024-01-08

## 2024-01-08 RX ORDER — DIPHENOXYLATE HYDROCHLORIDE AND ATROPINE SULFATE 2.5; .025 MG/1; MG/1
1 TABLET ORAL DAILY
COMMUNITY

## 2024-01-08 RX ORDER — HYDROCHLOROTHIAZIDE 25 MG/1
25 TABLET ORAL DAILY
Qty: 30 TABLET | Refills: 11 | Status: SHIPPED | OUTPATIENT
Start: 2024-01-08

## 2024-01-08 RX ORDER — MIRABEGRON 25 MG/1
25 TABLET, FILM COATED, EXTENDED RELEASE ORAL DAILY
COMMUNITY
Start: 2024-01-02

## 2024-01-08 NOTE — PROGRESS NOTES
Encounter Date:01/08/2024      Patient ID: Shaun Tyler is a 78 y.o. male.    Alejandro Richard PA-C    Cheif Complaint EP: Atrial Fibrillation    PROBLEM LIST:  Patient Active Problem List    Diagnosis Date Noted    Paroxysmal atrial fibrillation 03/19/2020     Priority: High     Note Last Updated: 4/10/2023     5/6/20: successful ECV  Echo 4/19/21: EF 61 - 65%. Left ventricular systolic function is normal. No significant structural or functional valvular disease.  MPS 4/26/2021:no evidence of ischemia.Left ventricular ejection fraction is hyperdynamic (Calculated EF > 70%).  ECV 7/19/22:  EPS, 1/4/2023 with Right antral circumferential pulmonary vein isolation. Extensive ablation involving wide antral circumferential pulmonary vein isolation left atrial roof left atrial floor entire left atrial posterior wall and base of left atrial appendage  Successful DC cardioversion, 2/9/2023      Congenital septal defect of heart 03/19/2020     Priority: Medium     Note Last Updated: 12/1/2022     Hx of ASD closure, in [place], approximately 2003  SENAIT, 11/22/2022: Normal LV, EF 55-60%.  Amplatzer atrial septal occluder device with normal appearance and no residual leak.  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  He returns today for scheduled  "electrophysiology follow-up.  He has had no awareness of palpitations, no dizziness no syncope.  He comes in today with extensive record of blood pressures and \"A-fib's\" measured by home monitor.  His home monitor occasionally indicates atrial fibrillation how however he is asymptomatic.  His blood pressures however, are mildly elevated to the 130s 140s with diastolics in the mid 80s to mid 90s.  He is on no current antihypertensives.  He had a thyroid panel in October 2023 that was elevated.  He states compliance with Eliquis 5 mg twice daily and compliance with his CPAP.        Allergies   Allergen Reactions    Penicillins Swelling     Face and lips as a child     Gramineae Pollens Other (See Comments)     Sneezing, Eye Redness       Current Outpatient Medications   Medication Instructions    Eliquis 5 MG tablet tablet TAKE ONE TABLET BY MOUTH TWICE A DAY    ferrous sulfate 325 mg, Oral, Weekly, sundays    fluticasone (FLONASE) 50 MCG/ACT nasal spray 2 sprays, Nasal, As Needed    lansoprazole (PREVACID) 30 mg, Oral, Daily    multivitamin tablet tablet 1 tablet, Oral, Daily    Myrbetriq 25 mg, Oral, Daily    psyllium (METAMUCIL) 58.6 % packet 1 packet, Oral, Daily    rosuvastatin (CRESTOR) 10 mg, Oral, Nightly       .    Objective:     /74 (BP Location: Left arm, Patient Position: Sitting, Cuff Size: Adult)   Pulse 57   Ht 188 cm (74\")   Wt 101 kg (222 lb 12.8 oz)   SpO2 92%   BMI 28.61 kg/m²    Body mass index is 28.61 kg/m².     Constitutional:       Appearance: Well-developed.   Pulmonary:      Effort: Pulmonary effort is normal. No respiratory distress.      Breath sounds: Normal breath sounds. No wheezing. No rales.      Comments: Bases clear  Chest:      Chest wall: Not tender to palpatation.   Cardiovascular:      Normal rate. Regular rhythm.      Murmurs: There is no murmur.      No gallop.  No click. No rub.   Pulses:     Intact distal pulses.   Edema:     Peripheral edema absent. "   Musculoskeletal: Normal range of motion.       Lab Review:     Lab Results   Component Value Date    GLUCOSE 113 (H) 10/15/2023    BUN 13 10/15/2023    CREATININE 0.84 10/15/2023    EGFR 90.4 10/15/2023    BCR 15.5 10/15/2023    K 4.3 10/15/2023    CO2 27.0 10/15/2023    CALCIUM 9.1 10/15/2023    ALBUMIN 4.1 10/15/2023    BILITOT 0.8 10/15/2023    AST 19 10/15/2023    ALT 18 10/15/2023     Lab Results   Component Value Date    WBC 5.57 10/15/2023    HGB 15.1 10/15/2023    HCT 45.2 10/15/2023    MCV 94.4 10/15/2023     10/15/2023     Lab Results   Component Value Date    TSH 4.630 (H) 10/15/2023           Procedures               Assessment:      Diagnosis Plan   1. Paroxysmal atrial fibrillation  Status post pulmonary vein isolation and chronically anticoagulated.  He is completely asymptomatic.  It is unclear if his home monitor readings are erroneous.  Fortunately, he is asymptomatic.  Not change his current routine.        2. Essential hypertension  I am adding hydrochlorothiazide 25 mg daily to his current medical regimen      3. NATHAN (obstructive sleep apnea)  CPAP compliant      4. Chronic anticoagulation  Tolerating anticoagulation, continue Eliquis 5 mg twice daily        Plan:     Defer thyroid management to primary care      Stable cardiac status.  Continue current medications.   in 1 year, sooner as needed.  Thank you for allowing us to participate in the care of your patient.     Electronically signed by CHAKA Anne, 01/08/24, 9:43 AM EST.

## 2024-01-11 ENCOUNTER — TELEPHONE (OUTPATIENT)
Dept: CARDIOLOGY | Facility: CLINIC | Age: 79
End: 2024-01-11
Payer: COMMERCIAL

## 2024-01-11 NOTE — TELEPHONE ENCOUNTER
Pt dx yesterday with pancreatic cancer. [facility] is working out plans to do a whipple. Advised they will likely reach out to us once they work out dates and pre admission testing.     Pt is not having any cardiac symptoms at this time. Advised to reach out to let us know when time gets closer and I will send msg to RDS to confirm CC and blood thinner protocol for his Eliquis. Pt verbalized understanding and agreeable to plan

## 2024-01-17 ENCOUNTER — OFFICE VISIT (OUTPATIENT)
Dept: SLEEP MEDICINE | Facility: HOSPITAL | Age: 79
End: 2024-01-17
Payer: COMMERCIAL

## 2024-01-17 VITALS
SYSTOLIC BLOOD PRESSURE: 126 MMHG | DIASTOLIC BLOOD PRESSURE: 74 MMHG | HEART RATE: 52 BPM | WEIGHT: 227.4 LBS | OXYGEN SATURATION: 96 % | BODY MASS INDEX: 29.18 KG/M2 | HEIGHT: 74 IN

## 2024-01-17 DIAGNOSIS — G47.33 OSA (OBSTRUCTIVE SLEEP APNEA): Primary | ICD-10-CM

## 2024-01-17 NOTE — PROGRESS NOTES
Subjective:     Chief Complaint:   Chief Complaint   Patient presents with    Follow-up       HPI:    Shaun Tyler is a 78 y.o. male here for follow-up of NATHAN    I saw him in initial consultation in November 2022.  He had received a diagnosis of atrial fibrillation and underwent ECV.  He was told that he snored by his wife and she had witnessed apneas.  He did not note much in the way of daytime somnolence.  Due to the suspicion of obstructive sleep apnea a home sleep apnea test was ordered and performed on 2/2323.  This revealed a severe degree of NATHAN with an AHI of 48.    He was initiated on auto CPAP therapy.  At the time of his initial follow-up in May 2023 with our APRN he had an AHI of 21.8 but also a high amount of mask leak.  His auto CPAP range was increased 12-20 cm H2O.  A return visit in July revealed an AHI of 11.9 with ongoing high leak.  A titration was ordered.    The titration was undertaken on 10/14 and multiple central events were noted.  Sleep efficiency was poor.  He had a very rapid titration to high BiPAP pressures.  Central events persisted.  He had some issues with relative tachycardia though his maximum rate was in the 80s.  Admittedly his heart rate typically is in the 50s.  He was sent to the ER but no issues were found and he was discharged home.  He did have an episode of vomiting after arriving home and then felt better.    He returns today for follow-up.  He is doing well with his auto CPAP therapy.  Settings are 12-20 cm H2O.  His download indicates a mild elevation of his AHI at 9.5.  Mask fit is good.    He tells me he received a recent diagnosis of pancreatic cancer and is undergoing workup for that.    [place] Scale is: 4/24    Current medications are:   Current Outpatient Medications:     Eliquis 5 MG tablet tablet, TAKE ONE TABLET BY MOUTH TWICE A DAY, Disp: 180 tablet, Rfl: 3    ferrous sulfate 325 (65 FE) MG tablet, Take 1 tablet by mouth 1 (One) Time Per Week. sundays,  Disp: , Rfl:     fluticasone (FLONASE) 50 MCG/ACT nasal spray, 2 sprays into the nostril(s) as directed by provider As Needed for Rhinitis or Allergies., Disp: , Rfl:     hydroCHLOROthiazide (HYDRODIURIL) 25 MG tablet, Take 1 tablet by mouth Daily., Disp: 30 tablet, Rfl: 11    lansoprazole (PREVACID) 30 MG capsule, Take 1 capsule by mouth Daily., Disp: , Rfl:     multivitamin tablet tablet, Take 1 tablet by mouth Daily., Disp: , Rfl:     Myrbetriq 25 MG tablet sustained-release 24 hour 24 hr tablet, Take 1 tablet by mouth Daily., Disp: , Rfl:     psyllium (METAMUCIL) 58.6 % packet, Take 1 packet by mouth Daily., Disp: , Rfl:     rosuvastatin (CRESTOR) 10 MG tablet, Take 1 tablet by mouth Every Night., Disp: 90 tablet, Rfl: 3.      The patient's relevant past medical, surgical, family and social history were reviewed and updated in Epic as appropriate.     ROS:    Review of Systems      Objective:    Physical Exam  Vitals reviewed.   Constitutional:       Appearance: He is well-developed.   HENT:      Head: Normocephalic and atraumatic.      Mouth/Throat:      Mouth: Mucous membranes are moist.      Pharynx: Oropharynx is clear.      Comments: Class III airway  Neck:      Thyroid: No thyromegaly.   Cardiovascular:      Rate and Rhythm: Normal rate and regular rhythm.      Heart sounds: No murmur heard.     No friction rub. No gallop.   Pulmonary:      Effort: Pulmonary effort is normal. No respiratory distress.      Breath sounds: No wheezing or rales.   Musculoskeletal:      Cervical back: Neck supple.   Skin:     General: Skin is warm and dry.   Neurological:      Mental Status: He is alert and oriented to person, place, and time.   Psychiatric:         Behavior: Behavior normal.         Thought Content: Thought content normal.         DATA:        Assessment:    Problem List Items Addressed This Visit          Pulmonary Problems    NATHAN (obstructive sleep apnea) - Primary    Relevant Orders    PAP Therapy        78-year-old male with a severe degree of obstructive sleep apnea based upon an HST in February 2023.  Auto CPAP therapy resulted in an ongoing elevation of his AHI and a high leak.  These events were not clearly central on his download.  He underwent a titration in October 2023 which revealed multiple central events though the titration was very rapid to high pressures and sleep efficiency was poor.  The patient also noted that the study night night was unusual and that he felt ill and does not think it was a typical night for him.    I decided at the time of his last visit to continue auto CPAP settings.  I did not think BiPAP ASV was appropriate.  I thought we needed to focus on mask leak.    He returns today with a mild elevation of his AHI but acceptable mask fit.  His compliance is good.    Plan:     No further change in his auto CPAP settings at this point.  I think a mildly elevated AHI is acceptable given the circumstances.  His mask leak has been addressed.  He continues to do well and benefit from therapy.  I reordered his supplies for the next year  Routine follow-up    Discussed in detail with the patient.  He will call prior to his follow up visit for any new problems.    Level of Risk Low risk of morbidity from additional diagnostic testing or treatment      Signed by  Kaushal Wiggins MD

## 2024-01-18 ENCOUNTER — TELEPHONE (OUTPATIENT)
Dept: NEUROLOGY | Facility: CLINIC | Age: 79
End: 2024-01-18

## 2024-01-18 NOTE — TELEPHONE ENCOUNTER
Name: Shaun Tyler    Relationship: Self    Best Callback Number:   Telephone Information:   Mobile [phone]         HUB PROVIDED THE RELAY MESSAGE FROM THE OFFICE   PATIENT VOICED UNDERSTANDING AND HAS NO FURTHER QUESTIONS AT THIS TIME

## 2024-01-18 NOTE — TELEPHONE ENCOUNTER
I called pt back. No answer. Left message reviewing MRI of brain in Nov 2023 showed no concerning findings.

## 2024-01-18 NOTE — TELEPHONE ENCOUNTER
Caller: Shaun Tylre    Relationship: Self    Best call back number: [phone]     What is the best time to reach you: ANY    Who are you requesting to speak with (clinical staff, provider,  specific staff member): PROVIDER    What was the call regarding: PATIENT TELEPHONED TO ADVISE HE HAD A BRAIN SCAN 6 MONTHS AGO. HE ALSO HAD GI SURGERY 1 WK AGO & STINTS WERE PUT IN. HE ADVISES DURING THAT SURGERY, A MALIGNANT PROBLEM WITH HIS PANCREAS WAS FOUND.    HE WOULD LIKE TO SPEAK TO PROVIDER RE: BRAIN SCAN.    PLEASE CALL & ADVISE-THANK YOU      Is it okay if the provider responds through [employer]: NO-NOT WORKING AT THE MOMENT

## 2024-03-08 NOTE — PROGRESS NOTES
OFFICE VISIT  NOTE  [facility] CARDIOLOGY      Name: Shaun Tyler    Date: 3/11/2024  MRN:  [id number]  :  1945      REFERRING/PRIMARY PROVIDER:  Alejandro Richard PA-C     Chief Complaint   Patient presents with    Atrial Fibrillation       HPI: Shaun Tyler is a 79 y.o. male who presents for follow-up for a-fib.  Associated history of percutaneous ASD closure in , hypertension, hyperlipidemia.  Work-up at  2020 includes echocardiogram showing EF of 40 to 50%, mild LA enlargement, moderate RA enlargement, normal pulmonary pressures, ASD closure device in place. Started flecainide 3/23/2020, underwent successful external cardioversion 2020.  Low risk stress test and echo 2021. Had recurrent Afib despite increase in Flecainide and was referred to Dr. Ruvalcaba. Had PVA for persistent Afib 2023.    Unfortunately diagnosed with pancreatic cancer at  2024 planning for Whipple.  Undergoing chemotherapy at this time.  No cardiac complaints such as chest pain shortness of breath or palpitations.    ROS:Pertinent positives as listed in the HPI.  All other systems reviewed and negative.    Past Medical History:   Diagnosis Date    A-fib     Acute electrocardiogram changes 2020    Aneurysm     Atrial septal defect     Cancer Bladder sheet cancer    Chicken pox     Essential hypertension 2020    GERD (gastroesophageal reflux disease)     Hematuria, unspecified type     Hiatal hernia     [place] (hard of hearing)     does not wear his hearing aids    Hyperbilirubinemia     Hyperlipidemia LDL goal <100 2020    Measles     Mumps     S/P patch closure of atrial septal defect     Sleep apnea     does not use c-pa p    Wears dentures        Past Surgical History:   Procedure Laterality Date    ATRIAL SEPTAL DEFECT REPAIR      CARDIAC ELECTROPHYSIOLOGY PROCEDURE N/A 2023    Procedure: PVA (persistent), Rythmia poss STX, no meds to hold;  Surgeon:  "Escobar Ruvalcaba DO;  Location: [facility] INVASIVE LOCATION;  Service: Cardiovascular;  Laterality: N/A;    CARDIOVERSION      11/4/2022 per Dr. Parker    COLONOSCOPY      ENDOSCOPY      EXCISION / REPAIR HYDROCELE PEDIATRIC      EYE SURGERY Bilateral     lasik and cataract    TONSILLECTOMY      WISDOM TOOTH EXTRACTION         Social History     Socioeconomic History    Marital status:    Tobacco Use    Smoking status: Never     Passive exposure: Never    Smokeless tobacco: Never   Vaping Use    Vaping status: Never Used   Substance and Sexual Activity    Alcohol use: Yes     Alcohol/week: 1.0 standard drink of alcohol     Types: 1 Cans of beer per week     Comment: every now and then    Drug use: Never    Sexual activity: Not Currently     Partners: Female       Family History   Problem Relation Age of Onset    Cancer Mother     Pneumonia Sister         Allergies   Allergen Reactions    Penicillins Swelling     Face and lips as a child     Gramineae Pollens Other (See Comments)     Sneezing, Eye Redness       Current Outpatient Medications   Medication Instructions    Eliquis 5 MG tablet tablet TAKE ONE TABLET BY MOUTH TWICE A DAY    ferrous sulfate 325 mg, Oral, Weekly, sundays    fluticasone (FLONASE) 50 MCG/ACT nasal spray 2 sprays, Nasal, As Needed    lansoprazole (PREVACID) 30 mg, Oral, Daily    multivitamin tablet tablet 1 tablet, Oral, Daily    Myrbetriq 25 mg, Oral, Daily    psyllium (METAMUCIL) 58.6 % packet 1 packet, Oral, Daily    rosuvastatin (CRESTOR) 10 mg, Oral, Nightly       Vitals:    03/11/24 0955   BP: 110/62   BP Location: Left arm   Patient Position: Sitting   Pulse: 86   SpO2: 98%   Weight: 97.3 kg (214 lb 6.4 oz)   Height: 188 cm (74\")       Body mass index is 27.53 kg/m².    PHYSICAL EXAM:    General Appearance:   well developed  well nourished  Neck:  thyroid not enlarged  supple  Respiratory:  no respiratory distress  normal breath sounds  no rales  Cardiovascular:  no jugular venous " distention  regular rhythm  apical impulse normal  S1 normal, S2 normal  no S3, no S4   no murmur  no rub, no thrill  carotid pulses normal; no bruit  pedal pulses normal  lower extremity edema: none    Skin:   warm, dry    RESULTS:   Procedures    Results for orders placed during the hospital encounter of 11/22/22    Adult Transesophageal Echo (SENAIT) W/ Cont if Necessary Per Protocol    Interpretation Summary    Left ventricular systolic function is normal. Left ventricular ejection fraction appears to be 56 - 60%.    Calculated right ventricular systolic pressure from tricuspid regurgitation is 30 mmHg.    Amplatzer atrial septal occluder device with normal appearance and no residual leak.    Normal left atrial appendage with no evidence of thrombus.    I supervised and directed an independent trained observer with the assistance of monitoring the patient's level of consciousness and physiological status throughout the procedure.  Intraoperative service time of 25 minutes        Labs:  Lab Results   Component Value Date    CHOL 278 (H) 04/28/2023    TRIG 186 (H) 04/28/2023    HDL 36 (L) 04/28/2023     (H) 04/28/2023    AST 19 10/15/2023    ALT 18 10/15/2023     Lab Results   Component Value Date    HGBA1C 5.8 (H) 03/15/2023     Creatinine   Date Value Ref Range Status   10/15/2023 0.84 0.76 - 1.27 mg/dL Final   04/30/2023 0.85 0.76 - 1.27 mg/dL Final   04/28/2023 1.04 0.76 - 1.27 mg/dL Final   02/09/2023 1.20 0.60 - 1.30 mg/dL Final   11/23/2021 1.10 0.80 - 1.30 mg/dL Final   05/06/2020 1.10 0.60 - 1.30 mg/dL Final     Comment:     Serial Number: [id number]:  [id number]     eGFR Non  Am   Date Value Ref Range Status   11/23/2021 >60 >60 mL/min/1.73m*2 Final     Comment:     eGFR = estimated GFR; eGFR units = mL/min/1.73 sq meters Chronic Kidney Disease is considered if eGFR <60 mL/min/1.73 sq meters Kidney failure is considered if eGFR is <15 mL/min/1.73 sq meters. eGFR assumes steady state plasma  creatinine concentration; not applicable if renal function is rapidly changing or patient is on dialysis.     eGFR Non  Amer   Date Value Ref Range Status   05/06/2020 75 >60 mL/min/1.73 Final         ASSESSMENT:  Problem List Items Addressed This Visit       Paroxysmal atrial fibrillation - Primary    Overview     5/6/20: successful ECV  Echo 4/19/21: EF 61 - 65%. Left ventricular systolic function is normal. No significant structural or functional valvular disease.  MPS 4/26/2021:no evidence of ischemia.Left ventricular ejection fraction is hyperdynamic (Calculated EF > 70%).  ECV 7/19/22:  EPS, 1/4/2023 with Right antral circumferential pulmonary vein isolation. Extensive ablation involving wide antral circumferential pulmonary vein isolation left atrial roof left atrial floor entire left atrial posterior wall and base of left atrial appendage  Successful DC cardioversion, 2/9/2023         Essential hypertension    Hyperlipidemia LDL goal <100    Congenital septal defect of heart    Overview     Hx of ASD closure, in [place], approximately 2003  SENAIT, 11/22/2022: Normal LV, EF 55-60%.  Amplatzer atrial septal occluder device with normal appearance and no residual leak.  Normal left atrial appendage            PLAN:    1.  Persistent atrial fibrillation/atrial flutter:  Had PVA with Dr. Ruvalcaba 01/2023  Continue Eliquis   Doing well maintaining sinus rhythm feels better      2.    Hypertension:  Discontinue HCTZ due to normal blood pressure and risk of hypovolemia with chemotherapy and cancer treatment.     3.  Hyperlipidemia:  Well-controlled, continue simvastatin 40 mg daily     4.  Decreased exercise tolerance:  Stable  Echocardiogram 4/19/21 LVEF 61-65%, no valvular disease  Stress test 4/19/21 no evidence of ischemia   Likely due to arrhythmia    5.  Preop cardiovascular assessment:  Planning Whipple procedure for pancreatic cancer  Low cardiovascular risk, okay to hold Eliquis 3 days prior to surgery  and resume the day after if feasible.      6. NATHAN  Doing well with cpap    Advance Care Planning   ACP discussion was held with the patient during this visit. Patient does not have an advance directive, information provided.          Follow-up   Return in about 1 year (around 3/11/2025).  Shaun Parker MD, FACC, [facility]  Interventional Cardiology

## 2024-03-11 ENCOUNTER — OFFICE VISIT (OUTPATIENT)
Dept: CARDIOLOGY | Facility: CLINIC | Age: 79
End: 2024-03-11
Payer: COMMERCIAL

## 2024-03-11 VITALS
WEIGHT: 214.4 LBS | OXYGEN SATURATION: 98 % | HEART RATE: 86 BPM | DIASTOLIC BLOOD PRESSURE: 62 MMHG | BODY MASS INDEX: 27.52 KG/M2 | HEIGHT: 74 IN | SYSTOLIC BLOOD PRESSURE: 110 MMHG

## 2024-03-11 DIAGNOSIS — I48.0 PAROXYSMAL ATRIAL FIBRILLATION: Primary | ICD-10-CM

## 2024-03-11 DIAGNOSIS — I10 ESSENTIAL HYPERTENSION: ICD-10-CM

## 2024-03-11 DIAGNOSIS — Q21.9 CONGENITAL SEPTAL DEFECT OF HEART: ICD-10-CM

## 2024-03-11 DIAGNOSIS — E78.5 HYPERLIPIDEMIA LDL GOAL <100: ICD-10-CM

## 2024-03-11 PROCEDURE — 99214 OFFICE O/P EST MOD 30 MIN: CPT | Performed by: INTERNAL MEDICINE

## 2024-03-22 ENCOUNTER — TELEPHONE (OUTPATIENT)
Dept: CARDIOLOGY | Facility: CLINIC | Age: 79
End: 2024-03-22

## 2024-03-22 ENCOUNTER — CLINICAL SUPPORT (OUTPATIENT)
Dept: CARDIOLOGY | Facility: CLINIC | Age: 79
End: 2024-03-22
Payer: COMMERCIAL

## 2024-03-22 DIAGNOSIS — I48.0 PAROXYSMAL ATRIAL FIBRILLATION: Primary | ICD-10-CM

## 2024-03-22 DIAGNOSIS — R00.2 PALPITATIONS: Primary | ICD-10-CM

## 2024-03-22 NOTE — TELEPHONE ENCOUNTER
Will CHAKA Chaidez reviewed EKG, Per Will, not in afib. Recommended ordering a monitor. I attempted to call the patient to go over monitor. No answer. LVM to return call.

## 2024-03-22 NOTE — TELEPHONE ENCOUNTER
"Pt went for chemo at  today. [facility] chemo RN note attached-    Infusion RN messaged me to see pt. Pt here today for C1D15 Abraxane and reported to RN feeling he was in Afib. He reports starting last night he \"could tell had gone into Afib, makes me feel off\", reports not sleeping well last night also.     Pt came in for EKG today. Had PVA with LAKSHMI 1/2023- will forward to LAKSHMI and Chivo for review. Pt saw CHAKA Bustillo on 1/8/24  "

## 2024-04-25 ENCOUNTER — TELEPHONE (OUTPATIENT)
Dept: CARDIOLOGY | Facility: CLINIC | Age: 79
End: 2024-04-25
Payer: COMMERCIAL

## 2024-04-25 NOTE — TELEPHONE ENCOUNTER
----- Message from Shaun Parker MD sent at 4/25/2024 12:22 PM EDT -----  Please inform the patient of their test results.  No arrhythmia, continue current therapy. Thank you.    NEUROLOGY PATIENT PRE-VISIT PLANNING     Patient was NOT contacted to complete PVP.    Patient Appointment is scheduled as: Established Patient     Is visit type and length scheduled correctly? Yes    [facility]? Yes    3.   Is referral attached to visit? Yes    4. Were records received from referring provider? Yes    4. Patient was NOT contacted to have someone accompany them to visit.     5. Is this appointment scheduled as a Hospital Follow-Up?  Yes, patient was seen in hospital on 01/01/23    6. Does the patient require any pre procedure or post procedure follow up? No    7. If any orders were placed at last visit or intended to be done for this visit do we have Results/Consult Notes? Yes  Labs - Labs ordered, completed on 10/12/22 and results are in chart.  Imaging - Imaging ordered, completed and results are in chart.  Referrals - A referral was placed for an EKG but it was not completed. A referral was also placed for psychological testing but it is not known if patient has been seen.     8. If patient appointment is for Botox - is order pended for provider? N/A

## 2024-08-07 RX ORDER — APIXABAN 5 MG/1
5 TABLET, FILM COATED ORAL 2 TIMES DAILY
Qty: 180 TABLET | Refills: 3 | Status: SHIPPED | OUTPATIENT
Start: 2024-08-07

## 2024-08-19 ENCOUNTER — TELEPHONE (OUTPATIENT)
Dept: CARDIOLOGY | Facility: CLINIC | Age: 79
End: 2024-08-19
Payer: COMMERCIAL

## 2024-08-19 RX ORDER — ROSUVASTATIN CALCIUM 10 MG/1
10 TABLET, COATED ORAL NIGHTLY
Qty: 90 TABLET | Refills: 3 | Status: SHIPPED | OUTPATIENT
Start: 2024-08-19

## 2024-08-19 NOTE — TELEPHONE ENCOUNTER
Pt wife LVM stating she was filling out paperwork at [facility] and had some questions. Returned call, no answer AYLEEN.

## 2024-08-29 ENCOUNTER — OFFICE VISIT (OUTPATIENT)
Dept: CARDIOLOGY | Facility: CLINIC | Age: 79
End: 2024-08-29
Payer: COMMERCIAL

## 2024-08-29 VITALS
HEIGHT: 74 IN | DIASTOLIC BLOOD PRESSURE: 70 MMHG | HEART RATE: 70 BPM | WEIGHT: 200 LBS | BODY MASS INDEX: 25.67 KG/M2 | OXYGEN SATURATION: 100 % | SYSTOLIC BLOOD PRESSURE: 104 MMHG

## 2024-08-29 DIAGNOSIS — I10 ESSENTIAL HYPERTENSION: ICD-10-CM

## 2024-08-29 DIAGNOSIS — Q21.9 CONGENITAL SEPTAL DEFECT OF HEART: ICD-10-CM

## 2024-08-29 DIAGNOSIS — E78.5 HYPERLIPIDEMIA LDL GOAL <100: ICD-10-CM

## 2024-08-29 DIAGNOSIS — I48.0 PAROXYSMAL ATRIAL FIBRILLATION: Primary | ICD-10-CM

## 2024-08-29 DIAGNOSIS — I95.1 ORTHOSTATIC HYPOTENSION: ICD-10-CM

## 2024-08-29 PROCEDURE — 99214 OFFICE O/P EST MOD 30 MIN: CPT | Performed by: INTERNAL MEDICINE

## 2024-08-29 RX ORDER — MIDODRINE HYDROCHLORIDE 10 MG/1
10 TABLET ORAL
COMMUNITY
Start: 2024-08-22

## 2024-08-29 RX ORDER — LIDOCAINE 50 MG/G
1 PATCH TOPICAL EVERY 24 HOURS
COMMUNITY
Start: 2024-08-01

## 2024-08-29 RX ORDER — ATORVASTATIN CALCIUM 20 MG/1
20 TABLET, FILM COATED ORAL DAILY
COMMUNITY
Start: 2024-08-13

## 2024-08-29 RX ORDER — POLYETHYLENE GLYCOL 3350 17 G/17G
POWDER, FOR SOLUTION ORAL
COMMUNITY

## 2024-08-29 RX ORDER — ONDANSETRON 4 MG/1
4 TABLET, ORALLY DISINTEGRATING ORAL EVERY 6 HOURS PRN
COMMUNITY
Start: 2024-07-31

## 2024-08-29 RX ORDER — BISACODYL 5 MG/1
5 TABLET, DELAYED RELEASE ORAL DAILY PRN
COMMUNITY

## 2024-08-29 RX ORDER — AMOXICILLIN 250 MG
2 CAPSULE ORAL DAILY
COMMUNITY
Start: 2024-03-18 | End: 2024-09-14

## 2024-08-29 RX ORDER — LACTULOSE 10 G/15ML
10 SOLUTION ORAL DAILY
COMMUNITY
Start: 2024-08-22

## 2024-08-29 NOTE — PROGRESS NOTES
OFFICE VISIT  NOTE  [facility] CARDIOLOGY      Name: Shaun Tyler    Date: 2024  MRN:  [id number]  :  1945      REFERRING/PRIMARY PROVIDER:  Alejandro Richard PA-C     Chief Complaint   Patient presents with    Paroxysmal atrial fibrillation       HPI: Shaun Tyler is a 79 y.o. male who presents for a-fib.  Associated history of percutaneous ASD closure in , hypertension, hyperlipidemia.  Work-up at  2020 includes echocardiogram showing EF of 40 to 50%, mild LA enlargement, moderate RA enlargement, normal pulmonary pressures, ASD closure device in place. Started flecainide 3/23/2020, underwent successful external cardioversion 2020.  Low risk stress test and echo 2021. Had recurrent Afib despite increase in Flecainide and was referred to Dr. Ruvalcaba. Had PVA for persistent Afib 2023.    Unfortunately diagnosed with pancreatic cancer at  2024 status post chemotherapy and Whipple procedure 2024.  Eliquis switched to Xarelto 15 mg daily due to absorption issues.  Main issue is orthostatic hypotension his blood pressure dropped 50 points when he stands up, gets weak after he walks.  [facility] rehab outpatient currently, going to do home physical therapy as well.    ROS:Pertinent positives as listed in the HPI.  All other systems reviewed and negative.    Past Medical History:   Diagnosis Date    A-fib     Acute electrocardiogram changes 2020    Aneurysm     Atrial septal defect     Cancer Bladder sheet cancer    Chicken pox     Essential hypertension 2020    GERD (gastroesophageal reflux disease)     Hematuria, unspecified type     Hiatal hernia     [place] (hard of hearing)     does not wear his hearing aids    Hyperbilirubinemia     Hyperlipidemia LDL goal <100 2020    Measles     Mumps     Pancreatic cancer 2024    S/P patch closure of atrial septal defect     Sleep apnea     does not use c-pa p    Wears dentures        Past  Surgical History:   Procedure Laterality Date    ATRIAL SEPTAL DEFECT REPAIR  2005    CARDIAC ELECTROPHYSIOLOGY PROCEDURE N/A 01/04/2023    Procedure: PVA (persistent), Rythmia poss STX, no meds to hold;  Surgeon: Escobar Ruvalcaba DO;  Location: [facility] INVASIVE LOCATION;  Service: Cardiovascular;  Laterality: N/A;    CARDIOVERSION      11/4/2022 per Dr. Parker    COLONOSCOPY      ENDOSCOPY      EXCISION / REPAIR HYDROCELE PEDIATRIC      EYE SURGERY Bilateral     lasik and cataract    TONSILLECTOMY      WHIPPLE PROCEDURE  06/21/2024        WISDOM TOOTH EXTRACTION         Social History     Socioeconomic History    Marital status:    Tobacco Use    Smoking status: Never     Passive exposure: Never    Smokeless tobacco: Never   Vaping Use    Vaping status: Never Used   Substance and Sexual Activity    Alcohol use: Yes     Alcohol/week: 1.0 standard drink of alcohol     Types: 1 Cans of beer per week     Comment: every now and then    Drug use: Never    Sexual activity: Not Currently     Partners: Female       Family History   Problem Relation Age of Onset    Cancer Mother     Pneumonia Sister         Allergies   Allergen Reactions    Penicillins Swelling     Face and lips as a child     Gramineae Pollens Other (See Comments)     Sneezing, Eye Redness       Current Outpatient Medications   Medication Instructions    atorvastatin (LIPITOR) 20 mg, Oral, Daily    bisacodyl (DULCOLAX) 5 mg, Oral, Daily PRN    Glycerin-Hypromellose- (ARTIFICIAL TEARS) 0.2-0.2-1 % solution ophthalmic solution 1 drop, Both Eyes, As Needed    lactulose (CHRONULAC) 10 g, Oral, Daily    lansoprazole (PREVACID) 30 mg, Oral, Daily    lidocaine (LIDODERM) 5 % 1 patch, Transdermal, Every 24 Hours    melatonin 5 mg, Oral, Nightly PRN    midodrine (PROAMATINE) 10 mg, Oral, 3 Times Daily Before Meals    ondansetron ODT (ZOFRAN-ODT) 4 mg, Oral, Every 6 Hours PRN    Polyethylene Glycol 3350 4 g pack Oral    rivaroxaban (XARELTO) 20 mg,  "Oral, Daily, Take 6 tablets (15 mg) by mouth 2 (two) times a day with meals for 1 day, THEN 8 tablets (20 mg) 1 (one) time each day with dinner. Take with food..    sennosides-docusate (PERICOLACE) 8.6-50 MG per tablet 2 tablets, Oral, Daily       Vitals:    08/29/24 1016   BP: 104/70   BP Location: Left arm   Patient Position: Sitting   Cuff Size: Adult   Pulse: 70   SpO2: 100%   Weight: 90.7 kg (200 lb)   Height: 188 cm (74\")         Body mass index is 25.68 kg/m².    PHYSICAL EXAM:    General Appearance:   well developed  well nourished  Neck:  thyroid not enlarged  supple  Respiratory:  no respiratory distress  normal breath sounds  no rales  Cardiovascular:  no jugular venous distention  regular rhythm  apical impulse normal  S1 normal, S2 normal  no S3, no S4   no murmur  no rub, no thrill  carotid pulses normal; no bruit  pedal pulses normal  lower extremity edema: none    Skin:   warm, dry    RESULTS:   Procedures    Results for orders placed during the hospital encounter of 11/22/22    Adult Transesophageal Echo (SENAIT) W/ Cont if Necessary Per Protocol    Interpretation Summary    Left ventricular systolic function is normal. Left ventricular ejection fraction appears to be 56 - 60%.    Calculated right ventricular systolic pressure from tricuspid regurgitation is 30 mmHg.    Amplatzer atrial septal occluder device with normal appearance and no residual leak.    Normal left atrial appendage with no evidence of thrombus.    I supervised and directed an independent trained observer with the assistance of monitoring the patient's level of consciousness and physiological status throughout the procedure.  Intraoperative service time of 25 minutes        Labs:  Lab Results   Component Value Date    CHOL 278 (H) 04/28/2023    TRIG 186 (H) 04/28/2023    HDL 36 (L) 04/28/2023     (H) 04/28/2023    AST 19 10/15/2023    ALT 18 10/15/2023     Lab Results   Component Value Date    HGBA1C 5.8 (H) 03/15/2023 "     Creatinine   Date Value Ref Range Status   10/15/2023 0.84 0.76 - 1.27 mg/dL Final   04/30/2023 0.85 0.76 - 1.27 mg/dL Final   04/28/2023 1.04 0.76 - 1.27 mg/dL Final   02/09/2023 1.20 0.60 - 1.30 mg/dL Final   11/23/2021 1.10 0.80 - 1.30 mg/dL Final   05/06/2020 1.10 0.60 - 1.30 mg/dL Final     Comment:     Serial Number: [id number]:  [id number]     eGFR Non  Am   Date Value Ref Range Status   11/23/2021 >60 >60 mL/min/1.73m*2 Final     Comment:     eGFR = estimated GFR; eGFR units = mL/min/1.73 sq meters Chronic Kidney Disease is considered if eGFR <60 mL/min/1.73 sq meters Kidney failure is considered if eGFR is <15 mL/min/1.73 sq meters. eGFR assumes steady state plasma creatinine concentration; not applicable if renal function is rapidly changing or patient is on dialysis.     eGFR Non  Amer   Date Value Ref Range Status   05/06/2020 75 >60 mL/min/1.73 Final         ASSESSMENT:  Problem List Items Addressed This Visit       Paroxysmal atrial fibrillation - Primary    Overview     5/6/20: successful ECV  Echo 4/19/21: EF 61 - 65%. Left ventricular systolic function is normal. No significant structural or functional valvular disease.  MPS 4/26/2021:no evidence of ischemia.Left ventricular ejection fraction is hyperdynamic (Calculated EF > 70%).  ECV 7/19/22:  EPS, 1/4/2023 with Right antral circumferential pulmonary vein isolation. Extensive ablation involving wide antral circumferential pulmonary vein isolation left atrial roof left atrial floor entire left atrial posterior wall and base of left atrial appendage  Successful DC cardioversion, 2/9/2023         Relevant Medications    midodrine (PROAMATINE) 10 MG tablet    Essential hypertension    Relevant Medications    midodrine (PROAMATINE) 10 MG tablet    Hyperlipidemia LDL goal <100    Relevant Medications    atorvastatin (LIPITOR) 20 MG tablet    Congenital septal defect of heart    Overview     Hx of ASD closure, in [place], approximately  2003  SENAIT, 11/22/2022: Normal LV, EF 55-60%.  Amplatzer atrial septal occluder device with normal appearance and no residual leak.  Normal left atrial appendage         Relevant Medications    midodrine (PROAMATINE) 10 MG tablet    Orthostatic hypotension       PLAN:    1.  Paroxysmal atrial fibrillation/atrial flutter:  Had PVA with Dr. Ruvalcaba 01/2023  Continue Xarelto, this was changed from Eliquis after Whipple due to absorption issues  Doing well maintaining sinus rhythm feels better  Frequent PACs noted on Holter 4/2024, 8%, this is why he has irregular heart rhythm readings on his home blood pressure machine likely not A-fib.    2.    Orthostatic hypotension:  He is on midodrine 15 mg 3 times daily  Will prescribe fludrocortisone 0.1 mg daily, and he needs to increase hydration he is only drinking 8 to 16 ounces of water daily advised him to increase to 64 ounces daily.     3.  Hyperlipidemia:  Well-controlled, continue simvastatin 40 mg daily     4.  Decreased exercise tolerance:  Stable  Echocardiogram 4/19/21 LVEF 61-65%, no valvular disease  Stress test 4/19/21 no evidence of ischemia       5.  Pancreatic cancer:  Status post Whipple procedure and chemotherapy at  6/2024  Follows with [facility].      6. NATHAN  Doing well with cpap    Advance Care Planning   ACP discussion was held with the patient during this visit. Patient does not have an advance directive, information provided.          Follow-up   Return in about 9 months (around 5/29/2025).  Shaun Parker MD, FACC, [facility]  Interventional Cardiology

## 2024-10-03 NOTE — PROGRESS NOTES
Sleep Clinic Follow Up Note    Chief Complaint  Follow-up, Sleeping Problem, Sleep Apnea, and Fatigue    Subjective     History of Present Illness (from previous encounter on 1/17/2024 with Dr. Wiggins):  Shaun Tyler is a 78 y.o. male here for follow-up of NATHAN     I saw him in initial consultation in November 2022.  He had received a diagnosis of atrial fibrillation and underwent ECV.  He was told that he snored by his wife and she had witnessed apneas.  He did not note much in the way of daytime somnolence.  Due to the suspicion of obstructive sleep apnea a home sleep apnea test was ordered and performed on 2/2323.  This revealed a severe degree of NATHAN with an AHI of 48.     He was initiated on auto CPAP therapy.  At the time of his initial follow-up in May 2023 with our APRN he had an AHI of 21.8 but also a high amount of mask leak.  His auto CPAP range was increased 12-20 cm H2O.  A return visit in July revealed an AHI of 11.9 with ongoing high leak.  A titration was ordered.     The titration was undertaken on 10/14 and multiple central events were noted.  Sleep efficiency was poor.  He had a very rapid titration to high BiPAP pressures.  Central events persisted.  He had some issues with relative tachycardia though his maximum rate was in the 80s.  Admittedly his heart rate typically is in the 50s.  He was sent to the ER but no issues were found and he was discharged home.  He did have an episode of vomiting after arriving home and then felt better.     He returns today for follow-up.  He is doing well with his auto CPAP therapy.  Settings are 12-20 cm H2O.  His download indicates a mild elevation of his AHI at 9.5.  Mask fit is good.     He tells me he received a recent diagnosis of pancreatic cancer and is undergoing workup for that.     [place] Scale is: 4/24 (End copied text).    -In lab polysomnography was obtained on 10/15/2023 revealing central sleep apnea unresponsive to CPAP or BiPAP.      Interval History:  Shaun Tyler is a 79 y.o. male returns for follow up and compliance of PAP therapy. The patient was last seen on 1/17/2024 by Dr. Wiggins. Overall the patient feels good with regard to therapy. He has had difficulty with mask fit. The device appears to be/does not working appropriately. On average the patient sleeps 5-6 hours per night. The patient wakes 0-1 times per night.     The patient reports the following changes to their medical and medication history since they were last seen:  Whipple Procedure   And long-term hospitalization      Further details are as follows:      [place] Scale is (out of 24): Total score: 3     Weight:  Current Weight: 199 lb    Weight change in the last year:  loss: 25 lbs    The patient's relevant past medical, surgical, family, and social history reviewed and updated in Epic as appropriate.    PMH:    Past Medical History:   Diagnosis Date    A-fib     Acute electrocardiogram changes 01/07/2020    Aneurysm     Atrial septal defect     Cancer Bladder sheet cancer    Chicken pox     Essential hypertension 03/19/2020    GERD (gastroesophageal reflux disease)     Hematuria, unspecified type     Hiatal hernia     [place] (hard of hearing)     does not wear his hearing aids    Hyperbilirubinemia     Hyperlipidemia LDL goal <100 03/19/2020    Measles     Mumps     Pancreatic cancer 03/2024    S/P patch closure of atrial septal defect     Sleep apnea     does not use c-pa p    Wears dentures      Past Surgical History:   Procedure Laterality Date    ATRIAL SEPTAL DEFECT REPAIR  2005    CARDIAC ELECTROPHYSIOLOGY PROCEDURE N/A 01/04/2023    Procedure: PVA (persistent), Rythmia poss STX, no meds to hold;  Surgeon: Escobar Ruvalcaba DO;  Location: [facility] INVASIVE LOCATION;  Service: Cardiovascular;  Laterality: N/A;    CARDIOVERSION      11/4/2022 per Dr. Parker    COLONOSCOPY      ENDOSCOPY      EXCISION / REPAIR HYDROCELE PEDIATRIC      EYE SURGERY Bilateral      lasik and cataract    TONSILLECTOMY      WHIPPLE PROCEDURE  06/21/2024        WISDOM TOOTH EXTRACTION         Allergies   Allergen Reactions    Penicillins Swelling     Face and lips as a child     Gramineae Pollens Other (See Comments)     Sneezing, Eye Redness       MEDS:  Prior to Admission medications    Medication Sig Start Date End Date Taking? Authorizing Provider   atorvastatin (LIPITOR) 20 MG tablet Take 1 tablet by mouth Daily. 8/13/24   Thuan Castellanos MD   bisacodyl (DULCOLAX) 5 MG EC tablet Take 1 tablet by mouth Daily As Needed for Constipation.    Thuan Castellanos MD   Glycerin-Hypromellose- (ARTIFICIAL TEARS) 0.2-0.2-1 % solution ophthalmic solution Administer 1 drop to both eyes As Needed for Dry Eyes. 7/31/24   Thuan Castellanos MD   lactulose (CHRONULAC) 10 GM/15ML solution Take 15 mL by mouth Daily. 8/22/24   Thuan Castellanos MD   lansoprazole (PREVACID) 30 MG capsule Take 1 capsule by mouth Daily.    Thuan Castellanos MD   lidocaine (LIDODERM) 5 % Place 1 patch on the skin as directed by provider Daily. 8/1/24   Thuan Castellanos MD   melatonin 5 MG tablet tablet Take 1 tablet by mouth At Night As Needed.    Thuan Castellanos MD   midodrine (PROAMATINE) 10 MG tablet Take 1 tablet by mouth 3 (Three) Times a Day Before Meals. 8/22/24   Thuan Castellanos MD   ondansetron ODT (ZOFRAN-ODT) 4 MG disintegrating tablet Take 1 tablet by mouth Every 6 (Six) Hours As Needed. 7/31/24   Thuan Castellanos MD   Polyethylene Glycol 3350 4 g pack Take  by mouth.    Thuan Castellanos MD   Rivaroxaban (XARELTO) 20 MG tablet Take 1 tablet by mouth Daily. Take 6 tablets (15 mg) by mouth 2 (two) times a day with meals for 1 day, THEN 8 tablets (20 mg) 1 (one) time each day with dinner. Take with food.. 8/29/24   Shaun Parker MD         FH:  Family History   Problem Relation Age of Onset    Cancer Mother     Pneumonia Sister        Objective   Vital Signs:  BP  "120/68 (BP Location: Left arm, Patient Position: Sitting, Cuff Size: Adult)   Pulse 61   Temp 97.5 °F (36.4 °C) (Temporal)   Ht 188 cm (74.02\")   Wt 90.3 kg (199 lb)   SpO2 99%   BMI 25.54 kg/m²           Physical Exam  Vitals reviewed.   Constitutional:       Appearance: Normal appearance.   HENT:      Head: Normocephalic and atraumatic.      Nose: Nose normal.      Mouth/Throat:      Mouth: Mucous membranes are moist.   Cardiovascular:      Rate and Rhythm: Normal rate and regular rhythm.      Heart sounds: No murmur heard.     No friction rub. No gallop.   Pulmonary:      Effort: Pulmonary effort is normal. No respiratory distress.      Breath sounds: Normal breath sounds. No wheezing or rhonchi.   Neurological:      Mental Status: He is alert and oriented to person, place, and time.   Psychiatric:         Behavior: Behavior normal.             Result Review :           PAP Report:  AHI: 10.6/h  Days of Usage: 16/30 (53%)  Number of Days Greater than 4 hours: 12/30 (40%)  Average time (days used): 5 hours 35 minutes  95th Percentile Pressure: 12.8 cmH2O  95th percentile leaks: 63.8 L/min  Settings: Auto CPAP-12/20 cm H2O, EPR off, response standard.       Assessment and Plan  Shaun Tyler is a 79 y.o. male who returns for follow-up and compliance of PAP therapy.  The Pap report has been reviewed.  Overall usage is at 53% with compliance of 40%.  Patient averaged 5 hours and 35 minutes of therapy.  AHI is elevated at 10.6/H.  Central apnea index is at 0.0 with obstructive at 0.1.  Patient was last seen by Dr. Wiggins on 1/17/2024 at which time focus was on mask fitting.  He continues to have a large leak at 63.8 L/min which is likely contributing to his AHI of 10.6/h. Otherwise at that time no further change to auto CPAP settings were recommended.  We have looked at some of his masks and he is going to try a small one to see if this is helpful.  Patient was hospitalized for several weeks which is " likely reflected in his usage data.  Despite this, I still would like for him to return for follow-up in approximately 6 months.  The patient would like to go to our other office as it is closer and we will make an appointment for him.  In the meantime, I will refill the patient's supplies.  Patient has lost 25 pounds thus far after hospitalization and convalescence following Whipple procedure.    Diagnoses and all orders for this visit:    1. Primary central sleep apnea (Primary)  -     PAP Therapy    2. Overweight with body mass index (BMI) 25.0-29.9         The patient continues to use and benefit from PAP therapy.    1. The patient was counseled regarding multimodal approach with healthy nutrition, healthy sleep, regular physical activity, social activities, counseling, and medications. Encouraged to practice lateral sleep position. Avoid alcohol and sedatives close to bedtime.     2.  We will refill supplies x1 year.  Return to clinic 1 year or sooner if symptoms warrant. I have reviewed the results of my evaluation and impression and discussed my recommendations in detail with the patient.           Follow Up  Return in about 1 year (around 10/8/2025) for Annual visit.  Patient was given instructions and counseling regarding his condition or for health maintenance advice. Please see specific information pulled into the AVS if appropriate.       SANDRA Cerda, ACNP-BC  Pulmonology, Critical Care, and Sleep Medicine

## 2024-10-08 ENCOUNTER — OFFICE VISIT (OUTPATIENT)
Dept: SLEEP MEDICINE | Age: 79
End: 2024-10-08
Payer: COMMERCIAL

## 2024-10-08 VITALS
HEART RATE: 61 BPM | BODY MASS INDEX: 25.54 KG/M2 | SYSTOLIC BLOOD PRESSURE: 120 MMHG | DIASTOLIC BLOOD PRESSURE: 68 MMHG | OXYGEN SATURATION: 99 % | WEIGHT: 199 LBS | HEIGHT: 74 IN | TEMPERATURE: 97.5 F

## 2024-10-08 DIAGNOSIS — E66.3 OVERWEIGHT WITH BODY MASS INDEX (BMI) 25.0-29.9: ICD-10-CM

## 2024-10-08 DIAGNOSIS — G47.31 PRIMARY CENTRAL SLEEP APNEA: Primary | ICD-10-CM

## 2024-11-13 ENCOUNTER — TELEPHONE (OUTPATIENT)
Dept: CARDIOLOGY | Facility: CLINIC | Age: 79
End: 2024-11-13
Payer: COMMERCIAL

## 2024-11-13 NOTE — TELEPHONE ENCOUNTER
PT IS AWARE OF THE APT AND IS AWARE THAT THE APT IS IN [place]. PT WAS OFFERED AN APT FOR TOMORROW AT 9:00 AM AND COULD NOT DO IT,PT WAS OFFERED SEVERAL MORE APTS AND COULD NOT DO THEM EITHER.

## 2024-11-13 NOTE — TELEPHONE ENCOUNTER
Patient called with updates regarding his chemo medication capecitabine and gemcitabine (this is new and they wanted  to be aware)    They said  would like them to be seen sooner than May that pt might be starting radiation?     Pt is currently on midodrine 15 mg that he only takes once daily currently.    88/68 siting   99/70 standing     These are morning BP before his medications, pt states he feels fine with these Bps.    Takes midodrine     Afternoon  120/80    Can we make sooner appointment?    Thanks.

## 2024-12-12 ENCOUNTER — OFFICE VISIT (OUTPATIENT)
Dept: CARDIOLOGY | Facility: CLINIC | Age: 79
End: 2024-12-12
Payer: COMMERCIAL

## 2024-12-12 VITALS
SYSTOLIC BLOOD PRESSURE: 102 MMHG | HEART RATE: 62 BPM | WEIGHT: 205.2 LBS | OXYGEN SATURATION: 100 % | DIASTOLIC BLOOD PRESSURE: 62 MMHG | BODY MASS INDEX: 26.34 KG/M2 | HEIGHT: 74 IN

## 2024-12-12 DIAGNOSIS — I48.0 PAROXYSMAL ATRIAL FIBRILLATION: Primary | ICD-10-CM

## 2024-12-12 DIAGNOSIS — E78.5 HYPERLIPIDEMIA LDL GOAL <100: ICD-10-CM

## 2024-12-12 DIAGNOSIS — I95.1 ORTHOSTATIC HYPOTENSION: ICD-10-CM

## 2024-12-12 DIAGNOSIS — I10 ESSENTIAL HYPERTENSION: ICD-10-CM

## 2024-12-12 PROCEDURE — 99214 OFFICE O/P EST MOD 30 MIN: CPT | Performed by: INTERNAL MEDICINE

## 2024-12-12 RX ORDER — ACETAMINOPHEN 500 MG
1000 TABLET ORAL EVERY 6 HOURS PRN
COMMUNITY

## 2024-12-12 RX ORDER — PROCHLORPERAZINE MALEATE 10 MG
10 TABLET ORAL EVERY 6 HOURS PRN
COMMUNITY
Start: 2024-11-11 | End: 2024-12-12

## 2024-12-12 RX ORDER — LOPERAMIDE HYDROCHLORIDE 2 MG/1
2 TABLET ORAL AS NEEDED
COMMUNITY
Start: 2024-11-11

## 2024-12-12 RX ORDER — SULFAMETHOXAZOLE AND TRIMETHOPRIM 400; 80 MG/1; MG/1
1 TABLET ORAL 2 TIMES DAILY
COMMUNITY
Start: 2024-10-29 | End: 2024-12-12

## 2024-12-12 RX ORDER — CAPECITABINE 500 MG/1
TABLET, FILM COATED ORAL
COMMUNITY
Start: 2024-11-11

## 2024-12-12 RX ORDER — LACTULOSE 10 G/15ML
SOLUTION ORAL; RECTAL
COMMUNITY
Start: 2024-11-30 | End: 2024-12-12

## 2024-12-12 RX ORDER — MIRABEGRON 25 MG/1
25 TABLET, FILM COATED, EXTENDED RELEASE ORAL DAILY
COMMUNITY
Start: 2024-09-05

## 2024-12-12 RX ORDER — CAPECITABINE 500 MG/1
500 TABLET, FILM COATED ORAL
COMMUNITY
Start: 2024-10-15

## 2024-12-12 NOTE — PROGRESS NOTES
OFFICE VISIT  NOTE  [facility] CARDIOLOGY      Name: Shaun Tyler    Date: 2024  MRN:  [id number]  :  1945      REFERRING/PRIMARY PROVIDER:  Alejandro Richard PA-C     Chief Complaint   Patient presents with    Paroxysmal atrial fibrillation     BP issues       HPI: Shaun Tyler is a 79 y.o. male who presents for labile hypertension.  Associated history of percutaneous ASD closure in , hypertension, hyperlipidemia.  Work-up at  2020 includes echocardiogram showing EF of 40 to 50%, mild LA enlargement, moderate RA enlargement, normal pulmonary pressures, ASD closure device in place. Started flecainide 3/23/2020, underwent successful external cardioversion 2020.  Low risk stress test and echo 2021. Had recurrent Afib despite increase in Flecainide and was referred to Dr. Ruvalcaba. Had PVA for persistent Afib 2023.    Unfortunately diagnosed with pancreatic cancer at  2024 s/p chemotherapy and Whipple procedure 2024.  Eliquis switched to Xarelto 15 mg daily due to absorption issues.      He sees cardio oncology at  as well.  They question whether he needed the midodrine anymore.  Blood pressure staying fairly stable he is not having as much dizziness when he stands up, he is now off midodrine, he never tried fludrocortisone.  Denies chest pain or palpitations.  Compliant with CPAP at night.  He is on chemotherapy currently for residual cancer.    ROS:Pertinent positives as listed in the HPI.  All other systems reviewed and negative.    Past Medical History:   Diagnosis Date    A-fib     Acute electrocardiogram changes 2020    Aneurysm     Atrial septal defect     Cancer Bladder sheet cancer    Chicken pox     Essential hypertension 2020    GERD (gastroesophageal reflux disease)     Hematuria, unspecified type     Hiatal hernia     [place] (hard of hearing)     does not wear his hearing aids    Hyperbilirubinemia     Hyperlipidemia LDL  goal <100 03/19/2020    Measles     Mumps     Pancreatic cancer 03/2024    S/P patch closure of atrial septal defect     Sleep apnea     does not use c-pa p    Wears dentures        Past Surgical History:   Procedure Laterality Date    ATRIAL SEPTAL DEFECT REPAIR  2005    CARDIAC ELECTROPHYSIOLOGY PROCEDURE N/A 01/04/2023    Procedure: PVA (persistent), Rythmia poss STX, no meds to hold;  Surgeon: Escobar Ruvalcaba DO;  Location: [facility] INVASIVE LOC[facility];  Service: Cardiovascular;  Laterality: N/A;    CARDIOVERSION      11/4/2022 per Dr. Parker    COLONOSCOPY      ENDOSCOPY      EXCISION / REPAIR HYDROCELE PEDIATRIC      EYE SURGERY Bilateral     lasik and cataract    TONSILLECTOMY      WHIPPLE PROCEDURE  06/21/2024        WISDOM TOOTH EXTRACTION         Social History     Socioeconomic History    Marital status:    Tobacco Use    Smoking status: Never     Passive exposure: Never    Smokeless tobacco: Never   Vaping Use    Vaping status: Never Used   Substance and Sexual Activity    Alcohol use: Yes     Alcohol/week: 1.0 standard drink of alcohol     Types: 1 Cans of beer per week     Comment: every now and then    Drug use: Never    Sexual activity: Not Currently     Partners: Female       Family History   Problem Relation Age of Onset    Cancer Mother     Pneumonia Sister         Allergies   Allergen Reactions    Penicillins Swelling     Face and lips as a child     Gramineae Pollens Other (See Comments)     Sneezing, Eye Redness       Current Outpatient Medications   Medication Instructions    acetaminophen (TYLENOL) 1,000 mg, Every 6 Hours PRN    atorvastatin (LIPITOR) 20 mg, Daily    bisacodyl (DULCOLAX) 5 mg, Daily PRN    capecitabine (XELODA) 500 MG chemo tablet Take 3 tabs (1500 mg) by mouth in the AM and 4 tabs (2000 mg) in the PM for 7 days on, then 7 days off, then another 7 days on.    capecitabine (XELODA) 500 mg    Glycerin-Hypromellose- (ARTIFICIAL TEARS) 0.2-0.2-1 % solution  "ophthalmic solution 1 drop, As Needed    lactulose (CHRONULAC) 10 g, Daily    lansoprazole (PREVACID) 30 mg, Daily    loperamide (IMODIUM A-D) 2 mg, As Needed    melatonin 5 mg, Nightly PRN    Mirabegron ER (MYRBETRIQ) 25 mg, Daily    ondansetron ODT (ZOFRAN-ODT) 4 mg, Every 6 Hours PRN    Polyethylene Glycol 3350 4 g pack Take  by mouth.       Vitals:    12/12/24 1046   BP: 102/62   BP Location: Right arm   Patient Position: Sitting   Cuff Size: Adult   Pulse: 62   SpO2: 100%   Weight: 93.1 kg (205 lb 3.2 oz)   Height: 188 cm (74.02\")         Body mass index is 26.33 kg/m².    PHYSICAL EXAM:    General Appearance:   No acute distress, alert and oriented  Neck:  thyroid not enlarged  supple  Respiratory:  no respiratory distress  normal breath sounds  no rales  Cardiovascular:  no jugular venous distention  regular rhythm  apical impulse normal  S1 normal, S2 normal  no S3, no S4   no murmur  no rub, no thrill  carotid pulses normal; no bruit  pedal pulses normal  lower extremity edema: none    Skin:   warm, dry    I performed a physical examination today and reviewed the above copied physical examination, which was updated where appropriate and unchanged otherwise.  RESULTS:   Procedures    Results for orders placed during the hospital encounter of 11/22/22    Adult Transesophageal Echo (SENAIT) W/ Cont if Necessary Per Protocol    Interpretation Summary    Left ventricular systolic function is normal. Left ventricular ejection fraction appears to be 56 - 60%.    Calculated right ventricular systolic pressure from tricuspid regurgitation is 30 mmHg.    Amplatzer atrial septal occluder device with normal appearance and no residual leak.    Normal left atrial appendage with no evidence of thrombus.    I supervised and directed an independent trained observer with the assistance of monitoring the patient's level of consciousness and physiological status throughout the procedure.  Intraoperative service time of 25 " minutes        Labs:  Lab Results   Component Value Date    CHOL 278 (H) 04/28/2023    TRIG 186 (H) 04/28/2023    HDL 36 (L) 04/28/2023     (H) 04/28/2023    AST 19 10/15/2023    ALT 18 10/15/2023     Lab Results   Component Value Date    HGBA1C 5.8 (H) 03/15/2023     Creatinine   Date Value Ref Range Status   10/15/2023 0.84 0.76 - 1.27 mg/dL Final   04/30/2023 0.85 0.76 - 1.27 mg/dL Final   04/28/2023 1.04 0.76 - 1.27 mg/dL Final   02/09/2023 1.20 0.60 - 1.30 mg/dL Final   11/23/2021 1.10 0.80 - 1.30 mg/dL Final   05/06/2020 1.10 0.60 - 1.30 mg/dL Final     Comment:     Serial Number: [id number]:  [id number]     eGFR Non  Am   Date Value Ref Range Status   11/23/2021 >60 >60 mL/min/1.73m*2 Final     Comment:     eGFR = estimated GFR; eGFR units = mL/min/1.73 sq meters Chronic Kidney Disease is considered if eGFR <60 mL/min/1.73 sq meters Kidney failure is considered if eGFR is <15 mL/min/1.73 sq meters. eGFR assumes steady state plasma creatinine concentration; not applicable if renal function is rapidly changing or patient is on dialysis.     eGFR Non  Amer   Date Value Ref Range Status   05/06/2020 75 >60 mL/min/1.73 Final         ASSESSMENT:  Problem List Items Addressed This Visit       Paroxysmal atrial fibrillation - Primary    Overview     5/6/20: successful ECV  Echo 4/19/21: EF 61 - 65%. Left ventricular systolic function is normal. No significant structural or functional valvular disease.  MPS 4/26/2021:no evidence of ischemia.Left ventricular ejection fraction is hyperdynamic (Calculated EF > 70%).  ECV 7/19/22:  EPS, 1/4/2023 with Right antral circumferential pulmonary vein isolation. Extensive ablation involving wide antral circumferential pulmonary vein isolation left atrial roof left atrial floor entire left atrial posterior wall and base of left atrial appendage  Successful DC cardioversion, 2/9/2023         Essential hypertension    Hyperlipidemia LDL goal <100    Orthostatic  "hypotension       PLAN:    1.  Paroxysmal atrial fibrillation/atrial flutter:  Had PVA with Dr. Ruvalcaba 01/2023  Previously on Xarelto, which continued for stroke prevention    Doing well maintaining sinus rhythm feels better  Frequent PACs noted on Holter 4/2024, 8%, this is why he has irregular heart rhythm readings on his home blood pressure machine likely not A-fib.    2.    Orthostatic hypotension:  Echo 7/2024 at  shows EF 60 to 65%  Stable now, off midodrine, never tried fludricortisone  Increased hydration is helping     3.  Hyperlipidemia:  Well-controlled, he is on atorvastatin 20 mg  Last LFT\"s were normal     4.  Decreased exercise tolerance:  Stable  Echocardiogram 4/19/21 LVEF 61-65%, no valvular disease  Stress test 4/19/21 no evidence of ischemia       5.  Pancreatic cancer:  Status post Whipple procedure and chemotherapy at  6/2024  Follows with [facility].      6. NATHAN  Doing well with cpap    Advance Care Planning   ACP discussion was held with the patient during this visit. Patient does not have an advance directive, information provided.          Follow-up   Return in about 9 months (around 9/12/2025).  Shaun Parker MD, FACC, [facility]  Interventional Cardiology      "

## 2025-01-13 ENCOUNTER — OFFICE VISIT (OUTPATIENT)
Dept: CARDIOLOGY | Facility: CLINIC | Age: 80
End: 2025-01-13
Payer: COMMERCIAL

## 2025-01-13 VITALS
DIASTOLIC BLOOD PRESSURE: 58 MMHG | HEART RATE: 53 BPM | BODY MASS INDEX: 26.9 KG/M2 | WEIGHT: 209.6 LBS | SYSTOLIC BLOOD PRESSURE: 90 MMHG | OXYGEN SATURATION: 98 % | HEIGHT: 74 IN

## 2025-01-13 DIAGNOSIS — I48.0 PAROXYSMAL ATRIAL FIBRILLATION: ICD-10-CM

## 2025-01-13 DIAGNOSIS — I95.1 ORTHOSTATIC HYPOTENSION: ICD-10-CM

## 2025-01-13 DIAGNOSIS — I10 ESSENTIAL HYPERTENSION: Primary | ICD-10-CM

## 2025-01-13 DIAGNOSIS — G47.33 OSA (OBSTRUCTIVE SLEEP APNEA): ICD-10-CM

## 2025-01-13 DIAGNOSIS — Q21.9 CONGENITAL SEPTAL DEFECT OF HEART: ICD-10-CM

## 2025-01-13 PROBLEM — C25.9 PANCREATIC CANCER: Status: ACTIVE | Noted: 2025-01-13

## 2025-01-13 PROBLEM — C67.9 BLADDER CANCER: Status: ACTIVE | Noted: 2025-01-13

## 2025-01-13 PROCEDURE — 99214 OFFICE O/P EST MOD 30 MIN: CPT | Performed by: PHYSICIAN ASSISTANT

## 2025-01-13 NOTE — PROGRESS NOTES
Patient ID: Shaun Tyler is a 79 y.o. male.    Alejandro Richard PA-C    Cheif Complaint EP: Atrial Fibrillation    PROBLEM LIST:  Patient Active Problem List    Diagnosis Date Noted    Paroxysmal atrial fibrillation 03/19/2020     Priority: High     Note Last Updated: 4/10/2023     5/6/20: successful ECV  Echo 4/19/21: EF 61 - 65%. Left ventricular systolic function is normal. No significant structural or functional valvular disease.  MPS 4/26/2021:no evidence of ischemia.Left ventricular ejection fraction is hyperdynamic (Calculated EF > 70%).  ECV 7/19/22:  EPS, 1/4/2023 with Right antral circumferential pulmonary vein isolation. 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  Amplatzer atrial septal occluder device with normal appearance and no residual leak.  Normal left atrial appendage      Acute electrocardiogram changes 01/07/2020    Hesitancy 09/04/2018    Allergic rhinitis due to pollen 04/30/2018    Acid reflux 07/22/2015               History of Present Illness  .  Pleasant 79-year-old gentleman returns today follow-up regarding atrial fibrillation.  He has had a difficult long course since his PVA for for A-fib which is included diagnosis of pancreatic cancer and  "bladder cancer.  He said further treatments with [facility] and remains on chemotherapy.  He also follows with heme-onc and has known severe anemia.  He tells me he has recovered from the surgery she is doing rehab getting stronger.  He has not noted any A-fib.  No dizziness.  His orthostasis has improved.  He does have some lower extremity edema which she is wearing compression stockings for.  He has no chest pain chest tightness or other heart failure type symptoms.  He has been reluctant to take anticoagulation due to hematuria on Xarelto.        Allergies   Allergen Reactions    Penicillins Swelling     Face and lips as a child     Gramineae Pollens Other (See Comments)     Sneezing, Eye Redness     .    Objective:     BP 90/58 (BP Location: Left arm, Patient Position: Sitting, Cuff Size: Adult)   Pulse 53   Ht 188 cm (74\")   Wt 95.1 kg (209 lb 9.6 oz)   SpO2 98%   BMI 26.91 kg/m²    Body mass index is 26.91 kg/m².     Constitutional:       Appearance: Well-developed.   Pulmonary:      Effort: Pulmonary effort is normal. No respiratory distress.      Breath sounds: Normal breath sounds. No wheezing. No rales.      Comments: Bases clear  Chest:      Chest wall: Not tender to palpatation.   Cardiovascular:      Normal rate. Regular rhythm.      Murmurs: There is no murmur.      No gallop.  No click. No rub.   Pulses:     Intact distal pulses.   Edema:     Peripheral edema absent.   Musculoskeletal: Normal range of motion.       Lab Review:     Lab Results   Component Value Date    GLUCOSE 162 (H) 07/14/2024    BUN 13 10/15/2023    CREATININE 0.84 10/15/2023    EGFR 90.4 10/15/2023    BCR 15.5 10/15/2023    K 3.2 (L) 07/14/2024    CO2 27.0 10/15/2023    CALCIUM 9.1 10/15/2023    ALBUMIN 4.1 10/15/2023    BILITOT 0.8 10/15/2023    AST 19 10/15/2023    ALT 18 10/15/2023     Lab Results   Component Value Date    WBC 7.72 12/31/2024    HGB 9.1 (L) 12/31/2024    HCT 27.7 (L) 12/31/2024    MCV 94 12/31/2024     " 12/31/2024     Lab Results   Component Value Date    TSH 4.630 (H) 10/15/2023           Procedures               Assessment:      Diagnosis Plan   1. Paroxysmal atrial fibrillation  Previous PVA, Sinus rhtym. Recent Monitor   3/25/2024, No Afib. PAC's.       2. Essential hypertension  Currently hypotensive and orthostasis.      3. NATHAN (obstructive sleep apnea)  CPAP compliant      4. Chronic anticoagulation  Intolerance to Anticoagulation.due to Hematuria. Chadsvasc=2, He declines anticoagulation.          Plan:   No clinical recurrence of A-fib.  His [id number] is 2 we discussed anticoagulation he defers for now he would like to take Eliquis as needed if he has recurrent atrial fibrillation.  He return follow-up or office as scheduled.  Electronically signed by CHAKA Barnett, 01/13/25, 9:22 AM EST.

## 2025-03-14 ENCOUNTER — TELEPHONE (OUTPATIENT)
Dept: CARDIOLOGY | Facility: CLINIC | Age: 80
End: 2025-03-14
Payer: COMMERCIAL

## 2025-03-14 NOTE — TELEPHONE ENCOUNTER
Patient called stating that his eliquis was too expensive for him when he went to go pick it up. Advised patient that we do not have him on eliquis. He said he was on eliquis originally, but was switched to xarelto due to absorption issues. Xarelto then was d/c'd due to hematuria. Patient is wanting to know if he needs to be taking eliquis (he can tolerate the eliquis better than xarelto)    Please advise.. thank you

## (undated) DEVICE — LEX ELECTRO PHYSIOLOGY: Brand: MEDLINE INDUSTRIES, INC.

## (undated) DEVICE — SYS CLS VASC/VENI VASCADE MVP 6TO12F

## (undated) DEVICE — ELECTRD RETRN/GRND MEGADYNE SGL/PLT W/CORD 9FT DISP

## (undated) DEVICE — OPEN-IRRIGATION TUBING SET: Brand: METRIQ™ IRRIGATION TUBING SET

## (undated) DEVICE — DECANT BG O JET

## (undated) DEVICE — ADULT, W/LG. BACK PAD, RADIOTRANSPARENT ELEMENT AND LEAD WIRE: Brand: DEFIBRILLATION ELECTRODES

## (undated) DEVICE — INTRO SHEATH ART/FEM ENGAGE .038 6F12CM

## (undated) DEVICE — STERILE (15.2 TAPERED TO 7.6 X 183CM) POLYETHYLENE ACCORDION-FOLDED COVER FOR USE WITH SIEMENS ACUNAV ULTRASOUND CATHETER FAMILY CONNECTOR: Brand: SWIFTLINK TRANSDUCER COVER

## (undated) DEVICE — SOL NACL 0.9PCT 1000ML

## (undated) DEVICE — DOME MONITORING W BONDED STPCK BIOTRANS2

## (undated) DEVICE — TBG SXN ESOPH ENSOETM FOR/BLANETROL/1/2

## (undated) DEVICE — INTRO SHEATH ENGAGE W/50 GW .038 8F12

## (undated) DEVICE — ST EXT IV LG BORE NDLESS FLTR LL 17IN

## (undated) DEVICE — HIGH RESOLUTION MAPPING CATHETER: Brand: INTELLAMAP ORION™

## (undated) DEVICE — ST INF PRI SMRTSTE 20DRP 2VLV 24ML 117

## (undated) DEVICE — INTRO STEER AGILIS NXT MED/CURL 8.5F

## (undated) DEVICE — GW TRNSEP SAFESEPT LT/ATRIAL RO 135CM .014IN

## (undated) DEVICE — KT MANIFLD EP

## (undated) DEVICE — Device: Brand: MEDEX

## (undated) DEVICE — ST EXT IV SMRTSTE 2VLV FIX M LL 6ML 41

## (undated) DEVICE — LOCATION REFERENCE PATCH KIT: Brand: RHYTHMIA™ MAPPING SYSTEM

## (undated) DEVICE — DRSNG SURESITE123 4X4.8IN

## (undated) DEVICE — ABLATION CATHETER: Brand: INTELLANAV MIFI™ OPEN-IRRIGATED

## (undated) DEVICE — Device: Brand: WEBSTER CS

## (undated) DEVICE — SYS TRNSEP ACC BRK ACROSS A/ 18G 98CM

## (undated) DEVICE — PRESSURE MONITORING SET: Brand: TRUWAVE

## (undated) DEVICE — CATH ULTRASND ECHOCARDIOGRAPHY ACUNAV

## (undated) DEVICE — INTRO SHEATH FAST/CATH LG/LUM 11F .038IN 12CM

## (undated) DEVICE — SET PRIMARY GRVTY 10DP MALE LL 104IN